# Patient Record
Sex: MALE | Race: WHITE | NOT HISPANIC OR LATINO | Employment: OTHER | URBAN - METROPOLITAN AREA
[De-identification: names, ages, dates, MRNs, and addresses within clinical notes are randomized per-mention and may not be internally consistent; named-entity substitution may affect disease eponyms.]

---

## 2020-09-15 DIAGNOSIS — I10 ESSENTIAL HYPERTENSION: ICD-10-CM

## 2020-09-15 DIAGNOSIS — F41.9 ANXIETY: Primary | ICD-10-CM

## 2020-09-15 DIAGNOSIS — G25.81 RLS (RESTLESS LEGS SYNDROME): ICD-10-CM

## 2020-09-15 RX ORDER — ALPRAZOLAM 0.5 MG/1
1 TABLET ORAL 3 TIMES DAILY PRN
COMMUNITY
End: 2020-09-15 | Stop reason: SDUPTHER

## 2020-09-15 RX ORDER — ROPINIROLE 0.5 MG/1
0.5 TABLET, FILM COATED ORAL 3 TIMES DAILY
COMMUNITY
End: 2020-09-15 | Stop reason: SDUPTHER

## 2020-09-17 RX ORDER — ROPINIROLE 0.5 MG/1
0.5 TABLET, FILM COATED ORAL 3 TIMES DAILY
Qty: 270 TABLET | Refills: 0 | Status: SHIPPED | OUTPATIENT
Start: 2020-09-17 | End: 2020-11-18

## 2020-09-17 RX ORDER — ALPRAZOLAM 0.5 MG/1
0.5 TABLET ORAL 3 TIMES DAILY PRN
Qty: 270 TABLET | Refills: 0 | Status: SHIPPED | OUTPATIENT
Start: 2020-09-17 | End: 2021-04-22 | Stop reason: SDUPTHER

## 2020-10-22 ENCOUNTER — OFFICE VISIT (OUTPATIENT)
Dept: FAMILY MEDICINE CLINIC | Facility: CLINIC | Age: 75
End: 2020-10-22
Payer: COMMERCIAL

## 2020-10-22 VITALS
WEIGHT: 186 LBS | BODY MASS INDEX: 28.19 KG/M2 | RESPIRATION RATE: 16 BRPM | SYSTOLIC BLOOD PRESSURE: 110 MMHG | TEMPERATURE: 98 F | DIASTOLIC BLOOD PRESSURE: 70 MMHG | HEIGHT: 68 IN | OXYGEN SATURATION: 97 % | HEART RATE: 69 BPM

## 2020-10-22 DIAGNOSIS — Z12.5 SCREENING FOR MALIGNANT NEOPLASM OF PROSTATE: ICD-10-CM

## 2020-10-22 DIAGNOSIS — R53.83 OTHER FATIGUE: ICD-10-CM

## 2020-10-22 DIAGNOSIS — K21.9 GASTROESOPHAGEAL REFLUX DISEASE WITHOUT ESOPHAGITIS: ICD-10-CM

## 2020-10-22 DIAGNOSIS — Z13.220 SCREENING CHOLESTEROL LEVEL: ICD-10-CM

## 2020-10-22 DIAGNOSIS — F41.9 ANXIETY: ICD-10-CM

## 2020-10-22 DIAGNOSIS — G25.81 RLS (RESTLESS LEGS SYNDROME): ICD-10-CM

## 2020-10-22 DIAGNOSIS — Z23 ENCOUNTER FOR IMMUNIZATION: Primary | ICD-10-CM

## 2020-10-22 DIAGNOSIS — I10 ESSENTIAL HYPERTENSION: ICD-10-CM

## 2020-10-22 PROCEDURE — 90662 IIV NO PRSV INCREASED AG IM: CPT | Performed by: FAMILY MEDICINE

## 2020-10-22 PROCEDURE — G0008 ADMIN INFLUENZA VIRUS VAC: HCPCS | Performed by: FAMILY MEDICINE

## 2020-10-22 PROCEDURE — 99214 OFFICE O/P EST MOD 30 MIN: CPT | Performed by: FAMILY MEDICINE

## 2020-10-22 RX ORDER — LANSOPRAZOLE 30 MG/1
30 CAPSULE, DELAYED RELEASE ORAL DAILY
Qty: 90 CAPSULE | Refills: 1 | Status: SHIPPED | OUTPATIENT
Start: 2020-10-22 | End: 2021-02-27

## 2020-11-17 DIAGNOSIS — G25.81 RLS (RESTLESS LEGS SYNDROME): ICD-10-CM

## 2020-11-17 DIAGNOSIS — I10 ESSENTIAL HYPERTENSION: ICD-10-CM

## 2020-11-18 RX ORDER — ROPINIROLE 0.5 MG/1
0.5 TABLET, FILM COATED ORAL 3 TIMES DAILY
Qty: 270 TABLET | Refills: 0 | Status: SHIPPED | OUTPATIENT
Start: 2020-11-18 | End: 2021-02-25

## 2021-02-13 DIAGNOSIS — Z23 ENCOUNTER FOR IMMUNIZATION: ICD-10-CM

## 2021-02-24 DIAGNOSIS — G25.81 RLS (RESTLESS LEGS SYNDROME): ICD-10-CM

## 2021-02-24 DIAGNOSIS — I10 ESSENTIAL HYPERTENSION: ICD-10-CM

## 2021-02-25 RX ORDER — ROPINIROLE 0.5 MG/1
TABLET, FILM COATED ORAL
Qty: 270 TABLET | Refills: 0 | Status: SHIPPED | OUTPATIENT
Start: 2021-02-25 | End: 2021-04-22 | Stop reason: SDUPTHER

## 2021-02-26 DIAGNOSIS — K21.9 GASTROESOPHAGEAL REFLUX DISEASE WITHOUT ESOPHAGITIS: ICD-10-CM

## 2021-02-27 RX ORDER — LANSOPRAZOLE 30 MG/1
CAPSULE, DELAYED RELEASE ORAL
Qty: 90 CAPSULE | Refills: 1 | Status: SHIPPED | OUTPATIENT
Start: 2021-02-27 | End: 2021-04-22 | Stop reason: SDUPTHER

## 2021-03-27 LAB
ALBUMIN SERPL-MCNC: 3.9 G/DL (ref 3.6–5.1)
ALBUMIN/GLOB SERPL: 1.3 (CALC) (ref 1–2.5)
ALP SERPL-CCNC: 95 U/L (ref 35–144)
ALT SERPL-CCNC: 12 U/L (ref 9–46)
AST SERPL-CCNC: 14 U/L (ref 10–35)
BASOPHILS # BLD AUTO: 37 CELLS/UL (ref 0–200)
BASOPHILS NFR BLD AUTO: 0.6 %
BILIRUB SERPL-MCNC: 0.7 MG/DL (ref 0.2–1.2)
BUN SERPL-MCNC: 15 MG/DL (ref 7–25)
BUN/CREAT SERPL: NORMAL (CALC) (ref 6–22)
CALCIUM SERPL-MCNC: 8.6 MG/DL (ref 8.6–10.3)
CHLORIDE SERPL-SCNC: 105 MMOL/L (ref 98–110)
CHOLEST SERPL-MCNC: 182 MG/DL
CHOLEST/HDLC SERPL: 5.4 (CALC)
CO2 SERPL-SCNC: 27 MMOL/L (ref 20–32)
CREAT SERPL-MCNC: 1.03 MG/DL (ref 0.7–1.18)
EOSINOPHIL # BLD AUTO: 81 CELLS/UL (ref 15–500)
EOSINOPHIL NFR BLD AUTO: 1.3 %
ERYTHROCYTE [DISTWIDTH] IN BLOOD BY AUTOMATED COUNT: 13.4 % (ref 11–15)
GLOBULIN SER CALC-MCNC: 2.9 G/DL (CALC) (ref 1.9–3.7)
GLUCOSE SERPL-MCNC: 97 MG/DL (ref 65–99)
HAV IGM SERPL QL IA: NORMAL
HBV CORE IGM SERPL QL IA: NORMAL
HBV SURFACE AG SERPL QL IA: NORMAL
HCT VFR BLD AUTO: 46.8 % (ref 38.5–50)
HCV AB S/CO SERPL IA: 0.01
HCV AB SERPL QL IA: NORMAL
HDLC SERPL-MCNC: 34 MG/DL
HGB BLD-MCNC: 15.6 G/DL (ref 13.2–17.1)
LDLC SERPL CALC-MCNC: 122 MG/DL (CALC)
LYMPHOCYTES # BLD AUTO: 1426 CELLS/UL (ref 850–3900)
LYMPHOCYTES NFR BLD AUTO: 23 %
MAGNESIUM SERPL-MCNC: 2 MG/DL (ref 1.5–2.5)
MCH RBC QN AUTO: 30.1 PG (ref 27–33)
MCHC RBC AUTO-ENTMCNC: 33.3 G/DL (ref 32–36)
MCV RBC AUTO: 90.2 FL (ref 80–100)
MONOCYTES # BLD AUTO: 341 CELLS/UL (ref 200–950)
MONOCYTES NFR BLD AUTO: 5.5 %
NEUTROPHILS # BLD AUTO: 4315 CELLS/UL (ref 1500–7800)
NEUTROPHILS NFR BLD AUTO: 69.6 %
NONHDLC SERPL-MCNC: 148 MG/DL (CALC)
PLATELET # BLD AUTO: 177 THOUSAND/UL (ref 140–400)
PMV BLD REES-ECKER: 10.2 FL (ref 7.5–12.5)
POTASSIUM SERPL-SCNC: 3.8 MMOL/L (ref 3.5–5.3)
PROT SERPL-MCNC: 6.8 G/DL (ref 6.1–8.1)
PSA SERPL-MCNC: 0.3 NG/ML
RBC # BLD AUTO: 5.19 MILLION/UL (ref 4.2–5.8)
SL AMB EGFR AFRICAN AMERICAN: 82 ML/MIN/1.73M2
SL AMB EGFR NON AFRICAN AMERICAN: 71 ML/MIN/1.73M2
SODIUM SERPL-SCNC: 139 MMOL/L (ref 135–146)
T4 FREE SERPL-MCNC: 1 NG/DL (ref 0.8–1.8)
TRIGL SERPL-MCNC: 151 MG/DL
TSH SERPL-ACNC: 2.44 MIU/L (ref 0.4–4.5)
URATE SERPL-MCNC: 4.8 MG/DL (ref 4–8)
WBC # BLD AUTO: 6.2 THOUSAND/UL (ref 3.8–10.8)

## 2021-04-16 ENCOUNTER — RA CDI HCC (OUTPATIENT)
Dept: OTHER | Facility: HOSPITAL | Age: 76
End: 2021-04-16

## 2021-04-16 NOTE — PROGRESS NOTES
[facility] 76  coding opportunities          Chart reviewed, no opportunity found: CHART REVIEWED, NO OPPORTUNITY FOUND              Patients insurance company: [employer] only)

## 2021-04-22 ENCOUNTER — OFFICE VISIT (OUTPATIENT)
Dept: FAMILY MEDICINE CLINIC | Facility: CLINIC | Age: 76
End: 2021-04-22
Payer: COMMERCIAL

## 2021-04-22 VITALS
TEMPERATURE: 97.2 F | OXYGEN SATURATION: 96 % | WEIGHT: 188 LBS | HEIGHT: 68 IN | SYSTOLIC BLOOD PRESSURE: 118 MMHG | DIASTOLIC BLOOD PRESSURE: 64 MMHG | RESPIRATION RATE: 16 BRPM | BODY MASS INDEX: 28.49 KG/M2 | HEART RATE: 60 BPM

## 2021-04-22 DIAGNOSIS — K21.9 GASTROESOPHAGEAL REFLUX DISEASE WITHOUT ESOPHAGITIS: ICD-10-CM

## 2021-04-22 DIAGNOSIS — Z00.00 WELL ADULT EXAM: ICD-10-CM

## 2021-04-22 DIAGNOSIS — I10 ESSENTIAL HYPERTENSION: Primary | ICD-10-CM

## 2021-04-22 DIAGNOSIS — F41.9 ANXIETY: ICD-10-CM

## 2021-04-22 DIAGNOSIS — Z23 ENCOUNTER FOR IMMUNIZATION: ICD-10-CM

## 2021-04-22 DIAGNOSIS — G25.81 RLS (RESTLESS LEGS SYNDROME): ICD-10-CM

## 2021-04-22 DIAGNOSIS — M25.551 RIGHT HIP PAIN: ICD-10-CM

## 2021-04-22 DIAGNOSIS — Z12.12 SCREENING FOR COLORECTAL CANCER: ICD-10-CM

## 2021-04-22 DIAGNOSIS — Z12.11 SCREENING FOR COLORECTAL CANCER: ICD-10-CM

## 2021-04-22 PROCEDURE — 3008F BODY MASS INDEX DOCD: CPT | Performed by: FAMILY MEDICINE

## 2021-04-22 PROCEDURE — 1125F AMNT PAIN NOTED PAIN PRSNT: CPT | Performed by: FAMILY MEDICINE

## 2021-04-22 PROCEDURE — 3074F SYST BP LT 130 MM HG: CPT | Performed by: FAMILY MEDICINE

## 2021-04-22 PROCEDURE — 3078F DIAST BP <80 MM HG: CPT | Performed by: FAMILY MEDICINE

## 2021-04-22 PROCEDURE — 99214 OFFICE O/P EST MOD 30 MIN: CPT | Performed by: FAMILY MEDICINE

## 2021-04-22 PROCEDURE — 1160F RVW MEDS BY RX/DR IN RCRD: CPT | Performed by: FAMILY MEDICINE

## 2021-04-22 PROCEDURE — 3288F FALL RISK ASSESSMENT DOCD: CPT | Performed by: FAMILY MEDICINE

## 2021-04-22 PROCEDURE — 3725F SCREEN DEPRESSION PERFORMED: CPT | Performed by: FAMILY MEDICINE

## 2021-04-22 PROCEDURE — 1170F FXNL STATUS ASSESSED: CPT | Performed by: FAMILY MEDICINE

## 2021-04-22 PROCEDURE — G0439 PPPS, SUBSEQ VISIT: HCPCS | Performed by: FAMILY MEDICINE

## 2021-04-22 PROCEDURE — 1036F TOBACCO NON-USER: CPT | Performed by: FAMILY MEDICINE

## 2021-04-22 RX ORDER — ALPRAZOLAM 0.5 MG/1
0.5 TABLET ORAL 3 TIMES DAILY PRN
Qty: 270 TABLET | Refills: 0 | Status: SHIPPED | OUTPATIENT
Start: 2021-04-22 | End: 2021-08-25

## 2021-04-22 RX ORDER — LANSOPRAZOLE 30 MG/1
30 CAPSULE, DELAYED RELEASE ORAL DAILY
Qty: 90 CAPSULE | Refills: 1 | Status: SHIPPED | OUTPATIENT
Start: 2021-04-22 | End: 2022-02-04 | Stop reason: SDUPTHER

## 2021-04-22 RX ORDER — ROPINIROLE 0.5 MG/1
0.5 TABLET, FILM COATED ORAL 3 TIMES DAILY
Qty: 270 TABLET | Refills: 1 | Status: SHIPPED | OUTPATIENT
Start: 2021-04-22 | End: 2022-02-04 | Stop reason: SDUPTHER

## 2021-04-22 RX ORDER — CELECOXIB 200 MG/1
200 CAPSULE ORAL 2 TIMES DAILY
Qty: 90 CAPSULE | Refills: 1 | Status: SHIPPED | OUTPATIENT
Start: 2021-04-22 | End: 2022-02-04 | Stop reason: SDUPTHER

## 2021-04-22 NOTE — PROGRESS NOTES
Assessment and Plan:     Problem List Items Addressed This Visit        Digestive    GERD (gastroesophageal reflux disease)     Patient to continue with present therapy and decrease caffeine, avoid ETOH and smoking to decrease acid production  Pt should also cease eating prior to bedtime and avoid excessive fluid intake prior to sleep  May use antacids as needed for breakthrough GERD            Cardiovascular and Mediastinum    Essential hypertension - Primary     Patient is stable with current anti-hypertensive medicine and continue to follow a low sodium diet and take current medication            Other    RLS (restless legs syndrome)     Patient is stable  and will continue present plan of care and reassess at next routine visit         Anxiety     Patient is stable  and will continue present plan of care and reassess at next routine visit           Other Visit Diagnoses     Screening for colorectal cancer        Encounter for immunization        Well adult exam               Preventive health issues were discussed with patient, and age appropriate screening tests were ordered as noted in patient's After Visit Summary  Personalized health advice and appropriate referrals for health education or preventive services given if needed, as noted in patient's After Visit Summary  History of Present Illness:     Patient presents for Medicare Annual Wellness visit    Patient Care Team:  Narendra Cedillo MD as PCP - General (Family Medicine)     Problem List:     Patient Active Problem List   Diagnosis    RLS (restless legs syndrome)    Essential hypertension    Anxiety    GERD (gastroesophageal reflux disease)      Past Medical and Surgical History:     Past Medical History:   Diagnosis Date    GERD (gastroesophageal reflux disease)      No past surgical history on file     Family History:     Family History   Family history unknown: Yes      Social History:        Social History     Socioeconomic History    Marital status: /Civil Union     Spouse name: None    Number of children: None    Years of education: None    Highest education level: None   Occupational History    Occupation: Retired   Social Needs    Financial resource strain: None    Food insecurity     Worry: None     Inability: None    Transportation needs     Medical: None     Non-medical: None   Tobacco Use    Smoking status: Former Smoker     Types: Cigarettes    Smokeless tobacco: Never Used    Tobacco comment: per [place]   Substance and Sexual Activity    Alcohol use: Yes     Comment: occasional    Drug use: Never     Comment: No use    Sexual activity: None   Lifestyle    Physical activity     Days per week: None     Minutes per session: None    Stress: None   Relationships    Social connections     Talks on phone: None     Gets together: None     Attends Gnosticism service: None     Active member of club or organization: None     Attends meetings of clubs or organizations: None     Relationship status: None    Intimate partner violence     Fear of current or ex partner: None     Emotionally abused: None     Physically abused: None     Forced sexual activity: None   Other Topics Concern    None   Social History Narrative    · Most recent tobacco use screenin2019      · Do you currently or have you served in [email or web address]: Yes      · If Yes, What branch of service:   [employer]      · 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, into active duty, 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:   Yes      · Occupation:   Retired      · Marital status:         · Sexual orientation:   Heterosexual      · Exercise level:   None      · Alcohol intake:   Occasional      · Caffeine intake:   None      · Chewing tobacco:   none      · Guns present in home: Yes      · Seat belts used routinely:   Yes      · Sunscreen used routinely:   Yes      · Smoke alarm in home:    Yes      · Advance directive:   No  19       Medications and Allergies:     Current Outpatient Medications   Medication Sig Dispense Refill    ALPRAZolam (XANAX) 0 5 mg tablet Take 1 tablet (0 5 mg total) by mouth 3 (three) times a day as needed for anxiety 270 tablet 0    lansoprazole (PREVACID) 30 mg capsule TAKE 1 CAPSULE EVERY DAY 90 capsule 1    metoprolol tartrate (LOPRESSOR) 25 mg tablet TAKE 1 TABLET TWICE DAILY 180 tablet 0    rOPINIRole (REQUIP) 0 5 mg tablet TAKE 1 TABLET THREE TIMES DAILY 270 tablet 0     No current facility-administered medications for this visit  No Known Allergies   Immunizations:     Immunization History   Administered Date(s) Administered    INFLUENZA 09/01/2015, 01/03/2018, 12/20/2018    Influenza Quadrivalent Preservative Free 3 years and older IM 10/29/2019    Influenza, high dose seasonal 0 7 mL 10/22/2020    Influenza, injectable, quadrivalent, preservative free 0 5 mL 10/29/2019    Influenza, seasonal, injectable, preservative free 11/15/2016    Pneumococcal 02/29/2012    Pneumococcal Conjugate 13-Valent 04/06/2017, 01/03/2018    SARS-CoV-2 / COVID-19 mRNA IM (Echavarria Iqraerer) 02/03/2021, 03/03/2021    Td (adult), Unspecified 01/01/2017    Tdap 01/28/2013, 04/06/2017      Health Maintenance:         Topic Date Due    Colorectal Cancer Screening  Never done    Hepatitis C Screening  Completed         Topic Date Due    Pneumococcal Vaccine: 65+ Years (2 of 2 - PPSV23) 01/03/2019      Medicare Health Risk Assessment:     Resp 16   Ht 5' 8" (1 727 m)   Wt 85 3 kg (188 lb)   BMI 28 59 kg/m²      Nirali Goodwin is here for his Subsequent Wellness visit  Health Risk Assessment:   Patient rates overall health as very good  Patient feels that their physical health rating is same  Patient is very satisfied with their life  Eyesight was rated as same  Hearing was rated as same  Patient feels that their emotional and mental health rating is same  Patients states they are never, rarely angry   Patient states they are never, rarely unusually tired/fatigued  Pain experienced in the last 7 days has been some  Patient's pain rating has been 5/10  Patient states that he has experienced no weight loss or gain in last 6 months  Depression Screening:   PHQ-2 Score: 0      Fall Risk Screening: In the past year, patient has experienced: no history of falling in past year      Home Safety:  Patient does not have trouble with stairs inside or outside of their home  Patient has working smoke alarms and has working carbon monoxide detector  Home safety hazards include: none  Nutrition:   Current diet is Regular  Medications:   Patient is currently taking over-the-counter supplements  OTC medications include: see medication list  Patient is able to manage medications  Activities of Daily Living (ADLs)/Instrumental Activities of Daily Living (IADLs):   Walk and transfer into and out of bed and chair?: Yes  Dress and groom yourself?: Yes    Bathe or shower yourself?: Yes    Feed yourself?  Yes  Do your laundry/housekeeping?: Yes  Manage your money, pay your bills and track your expenses?: Yes  Make your own meals?: Yes    Do your own shopping?: Yes    Previous Hospitalizations:   Any hospitalizations or ED visits within the last 12 months?: No      Advance Care Planning:   Living will: No    Durable POA for healthcare: No    Advanced directive: No    Advanced directive counseling given: No    Five wishes given: No    Patient declined ACP directive: No    End of Life Decisions reviewed with patient: No    Provider agrees with end of life decisions: No      Cognitive Screening:   Provider or family/friend/caregiver concerned regarding cognition?: No    PREVENTIVE SCREENINGS      Cardiovascular Screening:    General: Screening Current      Diabetes Screening:     General: Screening Current      Prostate Cancer Screening:    General: Screening Not Indicated      Abdominal Aortic Aneurysm (AAA) Screening:    Risk factors include: age between 73-68 yo and tobacco use        Lung Cancer Screening:     General: Screening Not Indicated      Hepatitis C Screening:    General: Screening Current    Screening, Brief Intervention, and Referral to Treatment (SBIRT)    Screening  Typical number of drinks in a day: 0  Typical number of drinks in a week: 9  Interpretation: Low risk drinking behavior      Single Item Drug Screening:  How often have you used an illegal drug (including marijuana) or a prescription medication for non-medical reasons in the past year? never    Single Item Drug Screen Score: 0  Interpretation: Negative screen for possible drug use disorder      Nicole Pablo MD

## 2021-04-22 NOTE — PATIENT INSTRUCTIONS
Medicare Preventive Visit Patient Instructions  Thank you for completing your Welcome to Medicare Visit or Medicare Annual Wellness Visit today  Your next wellness visit will be due in one year (4/23/2022)  The screening/preventive services that you may require over the next 5-10 years are detailed below  Some tests may not apply to you based off risk factors and/or age  Screening tests ordered at today's visit but not completed yet may show as past due  Also, please note that scanned in results may not display below  Preventive Screenings:  Service Recommendations Previous Testing/Comments   Colorectal Cancer Screening  · Colonoscopy    · Fecal Occult Blood Test (FOBT)/Fecal Immunochemical Test (FIT)  · Fecal DNA/Cologuard Test  · Flexible Sigmoidoscopy Age: 54-65 years old   Colonoscopy: every 10 years (May be performed more frequently if at higher risk)  OR  FOBT/FIT: every 1 year  OR  Cologuard: every 3 years  OR  Sigmoidoscopy: every 5 years  Screening may be recommended earlier than age 48 if at higher risk for colorectal cancer  Also, an individualized decision between you and your healthcare provider will decide whether screening between the ages of 74-80 would be appropriate   Colonoscopy: Not on file  FOBT/FIT: Not on file  Cologuard: Not on file  Sigmoidoscopy: Not on file          Prostate Cancer Screening Individualized decision between patient and health care provider in men between ages of 53-78   Medicare will cover every 12 months beginning on the day after your 50th birthday PSA: 0 3 ng/mL     Screening Not Indicated     Hepatitis C Screening Once for adults born between 1945 and 1965  More frequently in patients at high risk for Hepatitis C Hep C Antibody: 03/26/2021    Screening Current   Diabetes Screening 1-2 times per year if you're at risk for diabetes or have pre-diabetes Fasting glucose: No results in last 5 years   A1C: No results in last 5 years    Screening Current   Cholesterol Screening Once every 5 years if you don't have a lipid disorder  May order more often based on risk factors  Lipid panel: 03/26/2021    Screening Current      Other Preventive Screenings Covered by Medicare:  1  Abdominal Aortic Aneurysm (AAA) Screening: covered once if your at risk  You're considered to be at risk if you have a family history of AAA or a male between the age of 73-68 who smoking at least 100 cigarettes in your lifetime  2  Lung Cancer Screening: covers low dose CT scan once per year if you meet all of the following conditions: (1) Age 50-69; (2) No signs or symptoms of lung cancer; (3) Current smoker or have quit smoking within the last 15 years; (4) You have a tobacco smoking history of at least 30 pack years (packs per day x number of years you smoked); (5) You get a written order from a healthcare provider  3  Glaucoma Screening: covered annually if you're considered high risk: (1) You have diabetes OR (2) Family history of glaucoma OR (3)  aged 48 and older OR (3)  American aged 72 and older  3  Osteoporosis Screening: covered every 2 years if you meet one of the following conditions: (1) Have a vertebral abnormality; (2) On glucocorticoid therapy for more than 3 months; (3) Have primary hyperparathyroidism; (4) On osteoporosis medications and need to assess response to drug therapy  5  HIV Screening: covered annually if you're between the age of 12-76  Also covered annually if you are younger than 13 and older than 72 with risk factors for HIV infection  For pregnant patients, it is covered up to 3 times per pregnancy      Immunizations:  Immunization Recommendations   Influenza Vaccine Annual influenza vaccination during flu season is recommended for all persons aged >= 6 months who do not have contraindications   Pneumococcal Vaccine (Prevnar and Pneumovax)  * Prevnar = PCV13  * Pneumovax = PPSV23 Adults 25-60 years old: 1-3 doses may be recommended based on certain risk factors  Adults 72 years old: Prevnar (PCV13) vaccine recommended followed by Pneumovax (PPSV23) vaccine  If already received PPSV23 since turning 65, then PCV13 recommended at least one year after PPSV23 dose  Hepatitis B Vaccine 3 dose series if at intermediate or high risk (ex: diabetes, end stage renal disease, liver disease)   Tetanus (Td) Vaccine - COST NOT COVERED BY MEDICARE PART B Following completion of primary series, a booster dose should be given every 10 years to maintain immunity against tetanus  Td may also be given as tetanus wound prophylaxis  Tdap Vaccine - COST NOT COVERED BY MEDICARE PART B Recommended at least once for all adults  For pregnant patients, recommended with each pregnancy  Shingles Vaccine (Shingrix) - COST NOT COVERED BY MEDICARE PART B  2 shot series recommended in those aged 48 and above     Health Maintenance Due:      Topic Date Due    Colorectal Cancer Screening  Never done    Hepatitis C Screening  Completed     Immunizations Due:      Topic Date Due    Pneumococcal Vaccine: 65+ Years (2 of 2 - PPSV23) 01/03/2019     Advance Directives   What are advance directives? Advance directives are legal documents that state your wishes and plans for medical care  These plans are made ahead of time in case you lose your ability to make decisions for yourself  Advance directives can apply to any medical decision, such as the treatments you want, and if you want to donate organs  What are the types of advance directives? There are many types of advance directives, and each state has rules about how to use them  You may choose a combination of any of the following:  · Living will: This is a written record of the treatment you want  You can also choose which treatments you do not want, which to limit, and which to stop at a certain time  This includes surgery, medicine, IV fluid, and tube feedings  · Durable power of  for healthcare [facility]):   This is a written record that states who you want to make healthcare choices for you when you are unable to make them for yourself  This person, called a proxy, is usually a family member or a friend  You may choose more than 1 proxy  · Do not resuscitate (DNR) order:  A DNR order is used in case your heart stops beating or you stop breathing  It is a request not to have certain forms of treatment, such as CPR  A DNR order may be included in other types of advance directives  · Medical directive: This covers the care that you want if you are in a coma, near death, or unable to make decisions for yourself  You can list the treatments you want for each condition  Treatment may include pain medicine, surgery, blood transfusions, dialysis, IV or tube feedings, and a ventilator (breathing machine)  · Values history: This document has questions about your views, beliefs, and how you feel and think about life  This information can help others choose the care that you would choose  Why are advance directives important? An advance directive helps you control your care  Although spoken wishes may be used, it is better to have your wishes written down  Spoken wishes can be misunderstood, or not followed  Treatments may be given even if you do not want them  An advance directive may make it easier for your family to make difficult choices about your care  Weight Management   Why it is important to manage your weight:  Being overweight increases your risk of health conditions such as heart disease, high blood pressure, type 2 diabetes, and certain types of cancer  It can also increase your risk for osteoarthritis, sleep apnea, and other respiratory problems  Aim for a slow, steady weight loss  Even a small amount of weight loss can lower your risk of health problems  How to lose weight safely:  A safe and healthy way to lose weight is to eat fewer calories and get regular exercise   You can lose up about 1 pound a week by decreasing the number of calories you eat by 500 calories each day  Healthy meal plan for weight management:  A healthy meal plan includes a variety of foods, contains fewer calories, and helps you stay healthy  A healthy meal plan includes the following:  · Eat whole-grain foods more often  A healthy meal plan should contain fiber  Fiber is the part of grains, fruits, and vegetables that is not broken down by your body  Whole-grain foods are healthy and provide extra fiber in your diet  Some examples of whole-grain foods are whole-wheat breads and pastas, oatmeal, brown rice, and bulgur  · Eat a variety of vegetables every day  Include dark, leafy greens such as spinach, kale, odessa greens, and mustard greens  Eat yellow and orange vegetables such as carrots, sweet potatoes, and winter squash  · Eat a variety of fruits every day  Choose fresh or canned fruit (canned in its own juice or light syrup) instead of juice  Fruit juice has very little or no fiber  · Eat low-fat dairy foods  Drink fat-free (skim) milk or 1% milk  Eat fat-free yogurt and low-fat cottage cheese  Try low-fat cheeses such as mozzarella and other reduced-fat cheeses  · Choose meat and other protein foods that are low in fat  Choose beans or other legumes such as split peas or lentils  Choose fish, skinless poultry (chicken or turkey), or lean cuts of red meat (beef or pork)  Before you cook meat or poultry, cut off any visible fat  · Use less fat and oil  Try baking foods instead of frying them  Add less fat, such as margarine, sour cream, regular salad dressing and mayonnaise to foods  Eat fewer high-fat foods  Some examples of high-fat foods include french fries, doughnuts, ice cream, and cakes  · Eat fewer sweets  Limit foods and drinks that are high in sugar  This includes candy, cookies, regular soda, and sweetened drinks  Exercise:  Exercise at least 30 minutes per day on most days of the week   Some examples of exercise include walking, biking, dancing, and swimming  You can also fit in more physical activity by taking the stairs instead of the elevator or parking farther away from stores  Ask your healthcare provider about the best exercise plan for you  © Copyright [employer] 2018 Information is for End User's use only and may not be sold, redistributed or otherwise used for commercial purposes  All illustrations and images included in CareNotes® are the copyrighted property of [employer]  or [facility] Preventive Visit Patient Instructions  Thank you for completing your Welcome to Medicare Visit or Medicare Annual Wellness Visit today  Your next wellness visit will be due in one year (4/23/2022)  The screening/preventive services that you may require over the next 5-10 years are detailed below  Some tests may not apply to you based off risk factors and/or age  Screening tests ordered at today's visit but not completed yet may show as past due  Also, please note that scanned in results may not display below  Preventive Screenings:  Service Recommendations Previous Testing/Comments   Colorectal Cancer Screening  · Colonoscopy    · Fecal Occult Blood Test (FOBT)/Fecal Immunochemical Test (FIT)  · Fecal DNA/Cologuard Test  · Flexible Sigmoidoscopy Age: 54-65 years old   Colonoscopy: every 10 years (May be performed more frequently if at higher risk)  OR  FOBT/FIT: every 1 year  OR  Cologuard: every 3 years  OR  Sigmoidoscopy: every 5 years  Screening may be recommended earlier than age 48 if at higher risk for colorectal cancer  Also, an individualized decision between you and your healthcare provider will decide whether screening between the ages of 74-80 would be appropriate   Colonoscopy: Not on file  FOBT/FIT: Not on file  Cologuard: Not on file  Sigmoidoscopy: Not on file          Prostate Cancer Screening Individualized decision between patient and health care provider in men between ages of 55-69   Medicare will cover every 12 months beginning on the day after your 50th birthday PSA: 0 3 ng/mL     Screening Not Indicated  Screening Not Indicated     Hepatitis C Screening Once for adults born between 1945 and 1965  More frequently in patients at high risk for Hepatitis C Hep C Antibody: 03/26/2021    Screening Current  Screening Current   Diabetes Screening 1-2 times per year if you're at risk for diabetes or have pre-diabetes Fasting glucose: No results in last 5 years   A1C: No results in last 5 years    Screening Current  Screening Current   Cholesterol Screening Once every 5 years if you don't have a lipid disorder  May order more often based on risk factors  Lipid panel: 03/26/2021    Screening Current  Screening Current      Other Preventive Screenings Covered by Medicare:  6  Abdominal Aortic Aneurysm (AAA) Screening: covered once if your at risk  You're considered to be at risk if you have a family history of AAA or a male between the age of 73-68 who smoking at least 100 cigarettes in your lifetime  7  Lung Cancer Screening: covers low dose CT scan once per year if you meet all of the following conditions: (1) Age 50-69; (2) No signs or symptoms of lung cancer; (3) Current smoker or have quit smoking within the last 15 years; (4) You have a tobacco smoking history of at least 30 pack years (packs per day x number of years you smoked); (5) You get a written order from a healthcare provider  8  Glaucoma Screening: covered annually if you're considered high risk: (1) You have diabetes OR (2) Family history of glaucoma OR (3)  aged 48 and older OR (3)  American aged 72 and older  5   Osteoporosis Screening: covered every 2 years if you meet one of the following conditions: (1) Have a vertebral abnormality; (2) On glucocorticoid therapy for more than 3 months; (3) Have primary hyperparathyroidism; (4) On osteoporosis medications and need to assess response to drug therapy  10  HIV Screening: covered annually if you're between the age of 12-76  Also covered annually if you are younger than 13 and older than 72 with risk factors for HIV infection  For pregnant patients, it is covered up to 3 times per pregnancy  Immunizations:  Immunization Recommendations   Influenza Vaccine Annual influenza vaccination during flu season is recommended for all persons aged >= 6 months who do not have contraindications   Pneumococcal Vaccine (Prevnar and Pneumovax)  * Prevnar = PCV13  * Pneumovax = PPSV23 Adults 25-60 years old: 1-3 doses may be recommended based on certain risk factors  Adults 72 years old: Prevnar (PCV13) vaccine recommended followed by Pneumovax (PPSV23) vaccine  If already received PPSV23 since turning 65, then PCV13 recommended at least one year after PPSV23 dose  Hepatitis B Vaccine 3 dose series if at intermediate or high risk (ex: diabetes, end stage renal disease, liver disease)   Tetanus (Td) Vaccine - COST NOT COVERED BY MEDICARE PART B Following completion of primary series, a booster dose should be given every 10 years to maintain immunity against tetanus  Td may also be given as tetanus wound prophylaxis  Tdap Vaccine - COST NOT COVERED BY MEDICARE PART B Recommended at least once for all adults  For pregnant patients, recommended with each pregnancy  Shingles Vaccine (Shingrix) - COST NOT COVERED BY MEDICARE PART B  2 shot series recommended in those aged 48 and above     Health Maintenance Due:      Topic Date Due    Colorectal Cancer Screening  Never done    Hepatitis C Screening  Completed     Immunizations Due:      Topic Date Due    Pneumococcal Vaccine: 65+ Years (2 of 2 - PPSV23) 01/03/2019     Advance Directives   What are advance directives? Advance directives are legal documents that state your wishes and plans for medical care  These plans are made ahead of time in case you lose your ability to make decisions for yourself   Advance directives can apply to any medical decision, such as the treatments you want, and if you want to donate organs  What are the types of advance directives? There are many types of advance directives, and each state has rules about how to use them  You may choose a combination of any of the following:  · Living will: This is a written record of the treatment you want  You can also choose which treatments you do not want, which to limit, and which to stop at a certain time  This includes surgery, medicine, IV fluid, and tube feedings  · Durable power of  for healthcare [place] [facility]): This is a written record that states who you want to make healthcare choices for you when you are unable to make them for yourself  This person, called a proxy, is usually a family member or a friend  You may choose more than 1 proxy  · Do not resuscitate (DNR) order:  A DNR order is used in case your heart stops beating or you stop breathing  It is a request not to have certain forms of treatment, such as CPR  A DNR order may be included in other types of advance directives  · Medical directive: This covers the care that you want if you are in a coma, near death, or unable to make decisions for yourself  You can list the treatments you want for each condition  Treatment may include pain medicine, surgery, blood transfusions, dialysis, IV or tube feedings, and a ventilator (breathing machine)  · Values history: This document has questions about your views, beliefs, and how you feel and think about life  This information can help others choose the care that you would choose  Why are advance directives important? An advance directive helps you control your care  Although spoken wishes may be used, it is better to have your wishes written down  Spoken wishes can be misunderstood, or not followed  Treatments may be given even if you do not want them   An advance directive may make it easier for your family to make difficult choices about your care  Weight Management   Why it is important to manage your weight:  Being overweight increases your risk of health conditions such as heart disease, high blood pressure, type 2 diabetes, and certain types of cancer  It can also increase your risk for osteoarthritis, sleep apnea, and other respiratory problems  Aim for a slow, steady weight loss  Even a small amount of weight loss can lower your risk of health problems  How to lose weight safely:  A safe and healthy way to lose weight is to eat fewer calories and get regular exercise  You can lose up about 1 pound a week by decreasing the number of calories you eat by 500 calories each day  Healthy meal plan for weight management:  A healthy meal plan includes a variety of foods, contains fewer calories, and helps you stay healthy  A healthy meal plan includes the following:  · Eat whole-grain foods more often  A healthy meal plan should contain fiber  Fiber is the part of grains, fruits, and vegetables that is not broken down by your body  Whole-grain foods are healthy and provide extra fiber in your diet  Some examples of whole-grain foods are whole-wheat breads and pastas, oatmeal, brown rice, and bulgur  · Eat a variety of vegetables every day  Include dark, leafy greens such as spinach, kale, odessa greens, and mustard greens  Eat yellow and orange vegetables such as carrots, sweet potatoes, and winter squash  · Eat a variety of fruits every day  Choose fresh or canned fruit (canned in its own juice or light syrup) instead of juice  Fruit juice has very little or no fiber  · Eat low-fat dairy foods  Drink fat-free (skim) milk or 1% milk  Eat fat-free yogurt and low-fat cottage cheese  Try low-fat cheeses such as mozzarella and other reduced-fat cheeses  · Choose meat and other protein foods that are low in fat  Choose beans or other legumes such as split peas or lentils   Choose fish, skinless poultry (chicken or turkey), or lean cuts of red meat (beef or pork)  Before you cook meat or poultry, cut off any visible fat  · Use less fat and oil  Try baking foods instead of frying them  Add less fat, such as margarine, sour cream, regular salad dressing and mayonnaise to foods  Eat fewer high-fat foods  Some examples of high-fat foods include french fries, doughnuts, ice cream, and cakes  · Eat fewer sweets  Limit foods and drinks that are high in sugar  This includes candy, cookies, regular soda, and sweetened drinks  Exercise:  Exercise at least 30 minutes per day on most days of the week  Some examples of exercise include walking, biking, dancing, and swimming  You can also fit in more physical activity by taking the stairs instead of the elevator or parking farther away from stores  Ask your healthcare provider about the best exercise plan for you  © Copyright [employer] 2018 Information is for End User's use only and may not be sold, redistributed or otherwise used for commercial purposes   All illustrations and images included in CareNotes® are the copyrighted property of [employer]  or [address] [place] [address]

## 2021-04-22 NOTE — ASSESSMENT & PLAN NOTE
Patient to continue with present therapy and decrease caffeine, avoid ETOH and smoking to decrease acid production  Pt should also cease eating prior to bedtime and avoid excessive fluid intake prior to sleep   May use antacids as needed for breakthrough GERD

## 2021-04-22 NOTE — PROGRESS NOTES
BMI Counseling: There is no height or weight on file to calculate BMI  The BMI is above normal  Nutrition recommendations include decreasing portion sizes, encouraging healthy choices of fruits and vegetables, decreasing fast food intake, consuming healthier snacks, moderation in carbohydrate intake, increasing intake of lean protein, reducing intake of saturated and trans fat and reducing intake of cholesterol  No pharmacotherapy was ordered  Patient referred to PCP due to patient being overweight  Falls Plan of Care: balance, strength, and gait training instructions were provided  Assessment/Plan:         Problem List Items Addressed This Visit        Digestive    GERD (gastroesophageal reflux disease)     Patient to continue with present therapy and decrease caffeine, avoid ETOH and smoking to decrease acid production  Pt should also cease eating prior to bedtime and avoid excessive fluid intake prior to sleep  May use antacids as needed for breakthrough GERD            Cardiovascular and Mediastinum    Essential hypertension - Primary     Patient is stable with current anti-hypertensive medicine and continue to follow a low sodium diet and take current medication            Other    RLS (restless legs syndrome)     Patient is stable  and will continue present plan of care and reassess at next routine visit         Anxiety     Patient is stable  and will continue present plan of care and reassess at next routine visit           Other Visit Diagnoses     Screening for colorectal cancer        Encounter for immunization        Well adult exam                Subjective:      Patient ID: Brigido Gould is a 77 y o  male  This is a 50-year-old white male here for checkup on his blood pressure restless leg syndrome GERD anxiety and problems with right hip pain  Patient has had some problems with his joints with arthritis over the years as looking for something to take for pain    Will start him on some Celebrex 200 mg 1 today he does take Prevacid for stomach we told to take that was some food in the stomach to prevent from getting on ulcer disease he has normal kidney function at should not be a problem for him  He had lab work done today was reviewed and was normal   Patient also had his COVID-19 vaccine is doing quite well after that  Patient also needs refills on all of his medicines which include for pills  The following portions of the patient's history were reviewed and updated as appropriate:   Past Medical History:  He has a past medical history of GERD (gastroesophageal reflux disease)  ,  _______________________________________________________________________  Medical Problems:  does not have any pertinent problems on file ,  _______________________________________________________________________  Past Surgical History:   has no past surgical history on file ,  _______________________________________________________________________  Family History:  Family history is unknown by patient  ,  _______________________________________________________________________  Social History:   reports that he has quit smoking  His smoking use included cigarettes  He has never used smokeless tobacco  He reports current alcohol use  He reports that he does not use drugs  ,  _______________________________________________________________________  Allergies:  has No Known Allergies     _______________________________________________________________________  Current Outpatient Medications   Medication Sig Dispense Refill    ALPRAZolam (XANAX) 0 5 mg tablet Take 1 tablet (0 5 mg total) by mouth 3 (three) times a day as needed for anxiety 270 tablet 0    lansoprazole (PREVACID) 30 mg capsule TAKE 1 CAPSULE EVERY DAY 90 capsule 1    metoprolol tartrate (LOPRESSOR) 25 mg tablet TAKE 1 TABLET TWICE DAILY 180 tablet 0    rOPINIRole (REQUIP) 0 5 mg tablet TAKE 1 TABLET THREE TIMES DAILY 270 tablet 0     No current facility-administered medications for this visit       _______________________________________________________________________  Review of Systems   Constitutional: Negative for activity change, appetite change, chills, fatigue, fever and unexpected weight change  HENT: Negative for congestion, ear pain, hearing loss, mouth sores, postnasal drip, sinus pressure, sinus pain, sneezing and sore throat  Respiratory: Negative for apnea, cough, shortness of breath and wheezing  Cardiovascular: Negative for chest pain, palpitations and leg swelling  Gastrointestinal: Negative for abdominal pain, constipation, diarrhea, nausea and vomiting  Endocrine: Negative for cold intolerance and heat intolerance  Genitourinary: Negative for dysuria, frequency and hematuria  Musculoskeletal: Positive for arthralgias  Negative for back pain, gait problem, joint swelling and neck pain  Skin: Negative for rash  Neurological: Negative for dizziness, weakness and numbness  Hematological: Does not bruise/bleed easily  Psychiatric/Behavioral: Negative for agitation, behavioral problems, confusion, hallucinations and sleep disturbance  The patient is not nervous/anxious  Objective: There were no vitals filed for this visit  There is no height or weight on file to calculate BMI  Physical Exam  Vitals signs and nursing note reviewed  Constitutional:       Appearance: He is well-developed  HENT:      Head: Normocephalic and atraumatic  Nose: Nose normal       Mouth/Throat:      Mouth: Mucous membranes are moist    Eyes:      General: No scleral icterus  Conjunctiva/sclera: Conjunctivae normal       Pupils: Pupils are equal, round, and reactive to light  Neck:      Musculoskeletal: Normal range of motion and neck supple  Thyroid: No thyromegaly  Cardiovascular:      Rate and Rhythm: Normal rate and regular rhythm  Heart sounds: Normal heart sounds     Pulmonary:      Effort: Pulmonary effort is normal  No respiratory distress  Breath sounds: Normal breath sounds  No wheezing  Abdominal:      General: Bowel sounds are normal       Palpations: Abdomen is soft  Tenderness: There is no abdominal tenderness  There is no guarding or rebound  Musculoskeletal: Normal range of motion  General: Tenderness present  Skin:     General: Skin is warm and dry  Findings: No rash  Neurological:      Mental Status: He is alert and oriented to person, place, and time  Psychiatric:         Mood and Affect: Mood normal          Behavior: Behavior normal          Thought Content:  Thought content normal          Judgment: Judgment normal

## 2021-04-22 NOTE — PROGRESS NOTES
Assessment and Plan:     Problem List Items Addressed This Visit        Digestive    GERD (gastroesophageal reflux disease)     Patient to continue with present therapy and decrease caffeine, avoid ETOH and smoking to decrease acid production  Pt should also cease eating prior to bedtime and avoid excessive fluid intake prior to sleep  May use antacids as needed for breakthrough GERD         Relevant Medications    lansoprazole (PREVACID) 30 mg capsule       Cardiovascular and Mediastinum    Essential hypertension - Primary     Patient is stable with current anti-hypertensive medicine and continue to follow a low sodium diet and take current medication         Relevant Medications    metoprolol tartrate (LOPRESSOR) 25 mg tablet       Other    RLS (restless legs syndrome)     Patient is stable  and will continue present plan of care and reassess at next routine visit         Relevant Medications    rOPINIRole (REQUIP) 0 5 mg tablet    Anxiety     Patient is stable  and will continue present plan of care and reassess at next routine visit         Relevant Medications    ALPRAZolam (XANAX) 0 5 mg tablet    Right hip pain     Will start celebrex prn  Relevant Medications    celecoxib (CeleBREX) 200 mg capsule      Other Visit Diagnoses     Screening for colorectal cancer        Encounter for immunization        Well adult exam               Preventive health issues were discussed with patient, and age appropriate screening tests were ordered as noted in patient's After Visit Summary  Personalized health advice and appropriate referrals for health education or preventive services given if needed, as noted in patient's After Visit Summary       History of Present Illness:     Patient presents for Medicare Annual Wellness visit    Patient Care Team:  Zuleyma Chun MD as PCP - General (Family Medicine)     Problem List:     Patient Active Problem List   Diagnosis    RLS (restless legs syndrome)    Essential hypertension    Anxiety    GERD (gastroesophageal reflux disease)    Right hip pain      Past Medical and Surgical History:     Past Medical History:   Diagnosis Date    GERD (gastroesophageal reflux disease)     Right hip pain 2021     No past surgical history on file  Family History:     Family History   Family history unknown: Yes      Social History:        Social History     Socioeconomic History    Marital status: /Civil Union     Spouse name: None    Number of children: None    Years of education: None    Highest education level: None   Occupational History    Occupation: Retired   Social Needs    Financial resource strain: None    Food insecurity     Worry: None     Inability: None    Transportation needs     Medical: None     Non-medical: None   Tobacco Use    Smoking status: Former Smoker     Types: Cigarettes    Smokeless tobacco: Never Used    Tobacco comment: per Renetta Linder   Substance and Sexual Activity    Alcohol use: Yes     Comment: occasional    Drug use: Never     Comment: No use    Sexual activity: None   Lifestyle    Physical activity     Days per week: None     Minutes per session: None    Stress: None   Relationships    Social connections     Talks on phone: None     Gets together: None     Attends Orthodox service: None     Active member of club or organization: None     Attends meetings of clubs or organizations: None     Relationship status: None    Intimate partner violence     Fear of current or ex partner: None     Emotionally abused: None     Physically abused: None     Forced sexual activity: None   Other Topics Concern    None   Social History Narrative    · Most recent tobacco use screenin2019      · Do you currently or have you served in the [employer]:    Yes      · If Yes, What branch of service:   [employer]      · 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, into active duty, as a member of the [employer] or as a Reservist:   Yes      · Occupation: Retired      · Marital status:         · Sexual orientation:   Heterosexual      · Exercise level:   None      · Alcohol intake:   Occasional      · Caffeine intake:   None      · Chewing tobacco:   none      · Guns present in home: Yes      · Seat belts used routinely:   Yes      · Sunscreen used routinely:   Yes      · Smoke alarm in home: Yes      · Advance directive:   No  11/26/19       Medications and Allergies:     Current Outpatient Medications   Medication Sig Dispense Refill    ALPRAZolam (XANAX) 0 5 mg tablet Take 1 tablet (0 5 mg total) by mouth 3 (three) times a day as needed for anxiety 270 tablet 0    lansoprazole (PREVACID) 30 mg capsule Take 1 capsule (30 mg total) by mouth daily 90 capsule 1    metoprolol tartrate (LOPRESSOR) 25 mg tablet Take 1 tablet (25 mg total) by mouth 2 (two) times a day 180 tablet 1    rOPINIRole (REQUIP) 0 5 mg tablet Take 1 tablet (0 5 mg total) by mouth 3 (three) times a day 270 tablet 1    celecoxib (CeleBREX) 200 mg capsule Take 1 capsule (200 mg total) by mouth 2 (two) times a day 90 capsule 1     No current facility-administered medications for this visit        No Known Allergies   Immunizations:     Immunization History   Administered Date(s) Administered    INFLUENZA 09/01/2015, 01/03/2018, 12/20/2018    Influenza Quadrivalent Preservative Free 3 years and older IM 10/29/2019    Influenza, high dose seasonal 0 7 mL 10/22/2020    Influenza, injectable, quadrivalent, preservative free 0 5 mL 10/29/2019    Influenza, seasonal, injectable, preservative free 11/15/2016    Pneumococcal 02/29/2012    Pneumococcal Conjugate 13-Valent 04/06/2017, 01/03/2018    SARS-CoV-2 / COVID-19 mRNA IM (DIRECTV) 02/03/2021, 03/03/2021    Td (adult), Unspecified 01/01/2017    Tdap 01/28/2013, 04/06/2017      Health Maintenance:         Topic Date Due    Colorectal Cancer Screening  Never done    Hepatitis C Screening  Completed         Topic Date Due    Pneumococcal Vaccine: 65+ Years (2 of 2 - PPSV23) 01/03/2019      Medicare Health Risk Assessment:     /64 (BP Location: Left arm, Patient Position: Sitting, Cuff Size: Standard)   Pulse 60   Temp (!) 97 2 °F (36 2 °C) (Temporal)   Resp 16   Ht 5' 8" (1 727 m)   Wt 85 3 kg (188 lb)   SpO2 96%   BMI 28 59 kg/m²          Health Risk Assessment:   Patient rates overall health as very good  Patient feels that their physical health rating is same  Patient is very satisfied with their life  Eyesight was rated as same  Hearing was rated as same  Patient feels that their emotional and mental health rating is same  Patients states they are never, rarely angry  Patient states they are never, rarely unusually tired/fatigued  Pain experienced in the last 7 days has been some  Patient's pain rating has been 5/10  Patient states that he has experienced no weight loss or gain in last 6 months  Depression Screening:   PHQ-2 Score: 0      Fall Risk Screening: In the past year, patient has experienced: no history of falling in past year      Home Safety:  Patient does not have trouble with stairs inside or outside of their home  Patient has working smoke alarms and has working carbon monoxide detector  Home safety hazards include: none  Nutrition:   Current diet is Regular  Medications:   Patient is currently taking over-the-counter supplements  OTC medications include: see medication list  Patient is able to manage medications  Activities of Daily Living (ADLs)/Instrumental Activities of Daily Living (IADLs):   Walk and transfer into and out of bed and chair?: Yes  Dress and groom yourself?: Yes    Bathe or shower yourself?: Yes    Feed yourself?  Yes  Do your laundry/housekeeping?: Yes  Manage your money, pay your bills and track your expenses?: Yes  Make your own meals?: Yes    Do your own shopping?: Yes    Previous Hospitalizations:   Any hospitalizations or ED visits within the last 12 months?: No Advance Care Planning:   Living will: No    Durable POA for healthcare: No    Advanced directive: No    Advanced directive counseling given: No    Five wishes given: No    Patient declined ACP directive: No    End of Life Decisions reviewed with patient: No    Provider agrees with end of life decisions: No      Cognitive Screening:   Provider or family/friend/caregiver concerned regarding cognition?: No    PREVENTIVE SCREENINGS      Cardiovascular Screening:    General: Screening Current      Diabetes Screening:     General: Screening Current      Prostate Cancer Screening:    General: Screening Not Indicated      Abdominal Aortic Aneurysm (AAA) Screening:    Risk factors include: age between 73-67 yo and tobacco use        Lung Cancer Screening:     General: Screening Not Indicated      Hepatitis C Screening:    General: Screening Current    Screening, Brief Intervention, and Referral to Treatment (SBIRT)    Screening  Typical number of drinks in a day: 0  Typical number of drinks in a week: 0  Interpretation: Low risk drinking behavior      Single Item Drug Screening:  How often have you used an illegal drug (including marijuana) or a prescription medication for non-medical reasons in the past year? never    Single Item Drug Screen Score: 0  Interpretation: Negative screen for possible drug use disorder      Narendra Cedillo MD

## 2021-08-25 DIAGNOSIS — F41.9 ANXIETY: ICD-10-CM

## 2021-08-25 RX ORDER — ALPRAZOLAM 0.5 MG/1
TABLET ORAL
Qty: 90 TABLET | Refills: 0 | Status: SHIPPED | OUTPATIENT
Start: 2021-08-25

## 2021-08-25 NOTE — TELEPHONE ENCOUNTER
Requested medication(s) are due for refill today: Yes  Patient has already received a courtesy refill: No  Other reason request has been forwarded to provider: controlled substance cannot be delegated

## 2022-01-27 ENCOUNTER — RA CDI HCC (OUTPATIENT)
Dept: OTHER | Facility: HOSPITAL | Age: 77
End: 2022-01-27

## 2022-01-27 NOTE — PROGRESS NOTES
[facility]  coding opportunities       Chart reviewed, no opportunity found: CHART REVIEWED, NO OPPORTUNITY FOUND                        Patients insurance company: [employer] [address]  (Medicare Advantage and Commercial)

## 2022-02-01 NOTE — PROGRESS NOTES
BMI Counseling: There is no height or weight on file to calculate BMI  The BMI is above normal  Nutrition recommendations include decreasing portion sizes, encouraging healthy choices of fruits and vegetables, decreasing fast food intake, consuming healthier snacks, limiting drinks that contain sugar, moderation in carbohydrate intake, increasing intake of lean protein, reducing intake of saturated and trans fat and reducing intake of cholesterol  Exercise recommendations include exercising 3-5 times per week  No pharmacotherapy was ordered  Patient referred to PCP  Rationale for BMI follow-up plan is due to patient being overweight or obese  Falls Plan of Care: balance, strength, and gait training instructions were provided  Home safety education provided  Assessment/Plan:         Problem List Items Addressed This Visit        Digestive    GERD (gastroesophageal reflux disease)     Patient to continue with present therapy and decrease caffeine, avoid ETOH and smoking to decrease acid production  Pt should also cease eating prior to bedtime and avoid excessive fluid intake prior to sleep  May use antacids as needed for breakthrough GERD  All pateint questions answered today about this condition  Other    RLS (restless legs syndrome)     Patient is stable  and will continue present plan of care and reassess at next routine visit  All questions about this problem from patient were answered today  Anxiety     Patient to continue utilizing medical therapy as well as counseling sources as applicable to condition  If suicidal thought or fear of suicide attempt, to call 911 and seek help immediately  Medications and therapy reviewed today and all patient  questions answered today  Other Visit Diagnoses     Screening for colorectal cancer    -  Primary            Subjective:      Patient ID: Byron Lazcano is a 77 y o  male      This 30-year-old male here today for checkup on multiple medical problems  Patient with some GERD history of some right hip pain restless leg syndrome and some anxiety  Patient doing very well they still has some right hip pain  Patient needs refills on his prescriptions which is waiting on which pharmacy they are going to use because the changed after January 1st       The following portions of the patient's history were reviewed and updated as appropriate:   Past Medical History:  He has a past medical history of GERD (gastroesophageal reflux disease) and Right hip pain (4/22/2021)  ,  _______________________________________________________________________  Medical Problems:  does not have any pertinent problems on file ,  _______________________________________________________________________  Past Surgical History:   has no past surgical history on file ,  _______________________________________________________________________  Family History:  Family history is unknown by patient  ,  _______________________________________________________________________  Social History:   reports that he has quit smoking  His smoking use included cigarettes  He has never used smokeless tobacco  He reports current alcohol use  He reports that he does not use drugs  ,  _______________________________________________________________________  Allergies:  has No Known Allergies     _______________________________________________________________________  Current Outpatient Medications   Medication Sig Dispense Refill    ALPRAZolam (XANAX) 0 5 mg tablet TAKE 1 TABLET 3 TIMES A DAYAS NEEDED FOR ANXIETY 90 tablet 0    celecoxib (CeleBREX) 200 mg capsule Take 1 capsule (200 mg total) by mouth 2 (two) times a day 90 capsule 1    lansoprazole (PREVACID) 30 mg capsule Take 1 capsule (30 mg total) by mouth daily 90 capsule 1    metoprolol tartrate (LOPRESSOR) 25 mg tablet Take 1 tablet (25 mg total) by mouth 2 (two) times a day 180 tablet 1    rOPINIRole (REQUIP) 0 5 mg tablet Take 1 tablet (0 5 mg total) by mouth 3 (three) times a day 270 tablet 1     No current facility-administered medications for this visit      _______________________________________________________________________  Review of Systems   Constitutional: Negative for activity change, appetite change, chills, fatigue, fever and unexpected weight change  HENT: Negative for congestion, ear pain, hearing loss, mouth sores, postnasal drip, sinus pressure, sinus pain, sneezing and sore throat  Respiratory: Negative for apnea, cough, shortness of breath and wheezing  Cardiovascular: Negative for chest pain, palpitations and leg swelling  Gastrointestinal: Negative for abdominal pain, constipation, diarrhea, nausea and vomiting  Endocrine: Negative for cold intolerance and heat intolerance  Genitourinary: Negative for dysuria, frequency and hematuria  Musculoskeletal: Negative for arthralgias, back pain, gait problem, joint swelling and neck pain  Skin: Negative for rash  Neurological: Negative for dizziness, weakness and numbness  Hematological: Does not bruise/bleed easily  Psychiatric/Behavioral: Negative for agitation, behavioral problems, confusion, hallucinations and sleep disturbance  The patient is not nervous/anxious  Objective: There were no vitals filed for this visit  There is no height or weight on file to calculate BMI  Physical Exam  Vitals and nursing note reviewed  Constitutional:       Appearance: He is well-developed  HENT:      Head: Normocephalic and atraumatic  Nose: Nose normal       Mouth/Throat:      Mouth: Mucous membranes are moist    Eyes:      General: No scleral icterus  Conjunctiva/sclera: Conjunctivae normal       Pupils: Pupils are equal, round, and reactive to light  Neck:      Thyroid: No thyromegaly  Cardiovascular:      Rate and Rhythm: Normal rate and regular rhythm  Heart sounds: Normal heart sounds     Pulmonary:      Effort: Pulmonary effort is normal  No respiratory distress  Breath sounds: Normal breath sounds  No wheezing  Abdominal:      General: Bowel sounds are normal       Palpations: Abdomen is soft  Tenderness: There is no abdominal tenderness  There is no guarding or rebound  Musculoskeletal:         General: Normal range of motion  Cervical back: Normal range of motion and neck supple  Skin:     General: Skin is warm and dry  Findings: No rash  Neurological:      Mental Status: He is alert and oriented to person, place, and time  Psychiatric:         Mood and Affect: Mood normal          Behavior: Behavior normal          Thought Content:  Thought content normal          Judgment: Judgment normal

## 2022-02-02 ENCOUNTER — OFFICE VISIT (OUTPATIENT)
Dept: FAMILY MEDICINE CLINIC | Facility: CLINIC | Age: 77
End: 2022-02-02
Payer: COMMERCIAL

## 2022-02-02 ENCOUNTER — TELEPHONE (OUTPATIENT)
Dept: FAMILY MEDICINE CLINIC | Facility: CLINIC | Age: 77
End: 2022-02-02

## 2022-02-02 VITALS
HEIGHT: 68 IN | BODY MASS INDEX: 28.34 KG/M2 | HEART RATE: 63 BPM | OXYGEN SATURATION: 97 % | WEIGHT: 187 LBS | TEMPERATURE: 97.7 F | SYSTOLIC BLOOD PRESSURE: 128 MMHG | DIASTOLIC BLOOD PRESSURE: 78 MMHG

## 2022-02-02 DIAGNOSIS — K21.9 GASTROESOPHAGEAL REFLUX DISEASE WITHOUT ESOPHAGITIS: ICD-10-CM

## 2022-02-02 DIAGNOSIS — Z12.11 SCREENING FOR COLORECTAL CANCER: Primary | ICD-10-CM

## 2022-02-02 DIAGNOSIS — R53.83 OTHER FATIGUE: ICD-10-CM

## 2022-02-02 DIAGNOSIS — G25.81 RLS (RESTLESS LEGS SYNDROME): ICD-10-CM

## 2022-02-02 DIAGNOSIS — Z12.12 SCREENING FOR COLORECTAL CANCER: Primary | ICD-10-CM

## 2022-02-02 DIAGNOSIS — Z13.220 SCREENING CHOLESTEROL LEVEL: ICD-10-CM

## 2022-02-02 DIAGNOSIS — F41.9 ANXIETY: ICD-10-CM

## 2022-02-02 PROCEDURE — 99214 OFFICE O/P EST MOD 30 MIN: CPT | Performed by: FAMILY MEDICINE

## 2022-02-02 NOTE — TELEPHONE ENCOUNTER
Stephen Alejandro called, he said to let you know he is now going to use [employer] order if he needs any refills today

## 2022-02-04 DIAGNOSIS — G25.81 RLS (RESTLESS LEGS SYNDROME): ICD-10-CM

## 2022-02-04 DIAGNOSIS — M25.551 RIGHT HIP PAIN: ICD-10-CM

## 2022-02-04 DIAGNOSIS — K21.9 GASTROESOPHAGEAL REFLUX DISEASE WITHOUT ESOPHAGITIS: ICD-10-CM

## 2022-02-04 DIAGNOSIS — I10 ESSENTIAL HYPERTENSION: ICD-10-CM

## 2022-02-04 RX ORDER — ROPINIROLE 0.5 MG/1
0.5 TABLET, FILM COATED ORAL 3 TIMES DAILY
Qty: 270 TABLET | Refills: 1 | Status: SHIPPED | OUTPATIENT
Start: 2022-02-04 | End: 2022-08-03

## 2022-02-04 RX ORDER — CELECOXIB 200 MG/1
200 CAPSULE ORAL 2 TIMES DAILY
Qty: 90 CAPSULE | Refills: 1 | Status: SHIPPED | OUTPATIENT
Start: 2022-02-04 | End: 2022-04-08

## 2022-02-04 RX ORDER — LANSOPRAZOLE 30 MG/1
30 CAPSULE, DELAYED RELEASE ORAL DAILY
Qty: 90 CAPSULE | Refills: 1 | Status: SHIPPED | OUTPATIENT
Start: 2022-02-04 | End: 2022-08-03

## 2022-03-04 ENCOUNTER — TELEPHONE (OUTPATIENT)
Dept: FAMILY MEDICINE CLINIC | Facility: CLINIC | Age: 77
End: 2022-03-04

## 2022-03-04 NOTE — TELEPHONE ENCOUNTER
Please verify with pt his address  The form that was filled out has a different address that is in the computer       Form at  forms folder

## 2022-03-07 NOTE — TELEPHONE ENCOUNTER
Phone call from pt stating that he spoke with someone in our office and was instructed to white out wrong address and add correct address  Address in system is correct

## 2022-04-07 DIAGNOSIS — M25.551 RIGHT HIP PAIN: ICD-10-CM

## 2022-04-08 RX ORDER — CELECOXIB 200 MG/1
CAPSULE ORAL
Qty: 180 CAPSULE | Refills: 3 | Status: SHIPPED | OUTPATIENT
Start: 2022-04-08

## 2022-08-03 DIAGNOSIS — K21.9 GASTROESOPHAGEAL REFLUX DISEASE WITHOUT ESOPHAGITIS: ICD-10-CM

## 2022-08-03 DIAGNOSIS — I10 ESSENTIAL HYPERTENSION: ICD-10-CM

## 2022-08-03 DIAGNOSIS — G25.81 RLS (RESTLESS LEGS SYNDROME): ICD-10-CM

## 2022-08-03 RX ORDER — ROPINIROLE 0.5 MG/1
TABLET, FILM COATED ORAL
Qty: 270 TABLET | Refills: 3 | Status: SHIPPED | OUTPATIENT
Start: 2022-08-03

## 2022-08-03 RX ORDER — LANSOPRAZOLE 30 MG/1
CAPSULE, DELAYED RELEASE ORAL
Qty: 90 CAPSULE | Refills: 3 | Status: SHIPPED | OUTPATIENT
Start: 2022-08-03

## 2022-08-23 LAB
ALBUMIN SERPL-MCNC: 4.2 G/DL (ref 3.6–5.1)
ALBUMIN/GLOB SERPL: 1.4 (CALC) (ref 1–2.5)
ALP SERPL-CCNC: 98 U/L (ref 35–144)
ALT SERPL-CCNC: 15 U/L (ref 9–46)
APPEARANCE UR: CLEAR
AST SERPL-CCNC: 17 U/L (ref 10–35)
BACTERIA UR QL AUTO: NORMAL /HPF
BASOPHILS # BLD AUTO: 41 CELLS/UL (ref 0–200)
BASOPHILS NFR BLD AUTO: 0.7 %
BILIRUB SERPL-MCNC: 0.6 MG/DL (ref 0.2–1.2)
BILIRUB UR QL STRIP: NEGATIVE
BUN SERPL-MCNC: 12 MG/DL (ref 7–25)
BUN/CREAT SERPL: ABNORMAL (CALC) (ref 6–22)
CALCIUM SERPL-MCNC: 9.3 MG/DL (ref 8.6–10.3)
CHLORIDE SERPL-SCNC: 105 MMOL/L (ref 98–110)
CHOLEST SERPL-MCNC: 181 MG/DL
CHOLEST/HDLC SERPL: 5.5 (CALC)
CO2 SERPL-SCNC: 26 MMOL/L (ref 20–32)
COLOR UR: YELLOW
CREAT SERPL-MCNC: 1 MG/DL (ref 0.7–1.28)
EOSINOPHIL # BLD AUTO: 110 CELLS/UL (ref 15–500)
EOSINOPHIL NFR BLD AUTO: 1.9 %
ERYTHROCYTE [DISTWIDTH] IN BLOOD BY AUTOMATED COUNT: 13 % (ref 11–15)
GFR/BSA.PRED SERPLBLD CYS-BASED-ARV: 78 ML/MIN/1.73M2
GLOBULIN SER CALC-MCNC: 2.9 G/DL (CALC) (ref 1.9–3.7)
GLUCOSE SERPL-MCNC: 102 MG/DL (ref 65–99)
GLUCOSE UR QL STRIP: NEGATIVE
HCT VFR BLD AUTO: 46.5 % (ref 38.5–50)
HDLC SERPL-MCNC: 33 MG/DL
HGB BLD-MCNC: 15.6 G/DL (ref 13.2–17.1)
HGB UR QL STRIP: NEGATIVE
HYALINE CASTS #/AREA URNS LPF: NORMAL /LPF
KETONES UR QL STRIP: NEGATIVE
LDLC SERPL CALC-MCNC: 118 MG/DL (CALC)
LEUKOCYTE ESTERASE UR QL STRIP: NEGATIVE
LYMPHOCYTES # BLD AUTO: 1601 CELLS/UL (ref 850–3900)
LYMPHOCYTES NFR BLD AUTO: 27.6 %
MAGNESIUM SERPL-MCNC: 2.1 MG/DL (ref 1.5–2.5)
MCH RBC QN AUTO: 30.4 PG (ref 27–33)
MCHC RBC AUTO-ENTMCNC: 33.5 G/DL (ref 32–36)
MCV RBC AUTO: 90.6 FL (ref 80–100)
MONOCYTES # BLD AUTO: 360 CELLS/UL (ref 200–950)
MONOCYTES NFR BLD AUTO: 6.2 %
NEUTROPHILS # BLD AUTO: 3689 CELLS/UL (ref 1500–7800)
NEUTROPHILS NFR BLD AUTO: 63.6 %
NITRITE UR QL STRIP: NEGATIVE
NONHDLC SERPL-MCNC: 148 MG/DL (CALC)
PH UR STRIP: 6 [PH] (ref 5–8)
PLATELET # BLD AUTO: 198 THOUSAND/UL (ref 140–400)
PMV BLD REES-ECKER: 10.3 FL (ref 7.5–12.5)
POTASSIUM SERPL-SCNC: 4.1 MMOL/L (ref 3.5–5.3)
PROT SERPL-MCNC: 7.1 G/DL (ref 6.1–8.1)
PROT UR QL STRIP: NEGATIVE
RBC # BLD AUTO: 5.13 MILLION/UL (ref 4.2–5.8)
RBC #/AREA URNS HPF: NORMAL /HPF
SODIUM SERPL-SCNC: 138 MMOL/L (ref 135–146)
SP GR UR STRIP: 1.01 (ref 1–1.03)
SQUAMOUS #/AREA URNS HPF: NORMAL /HPF
TRIGL SERPL-MCNC: 181 MG/DL
URATE SERPL-MCNC: 5.2 MG/DL (ref 4–8)
WBC # BLD AUTO: 5.8 THOUSAND/UL (ref 3.8–10.8)
WBC #/AREA URNS HPF: NORMAL /HPF

## 2022-08-24 NOTE — PROGRESS NOTES
Falls Plan of Care: balance, strength, and gait training instructions were provided  Home safety education provided  Assessment/Plan:         Problem List Items Addressed This Visit        Digestive    GERD (gastroesophageal reflux disease) - Primary     Patient to continue with present therapy and decrease caffeine, avoid ETOH and smoking to decrease acid production  Pt should also cease eating prior to bedtime and avoid excessive fluid intake prior to sleep  May use antacids as needed for breakthrough GERD  All pateint questions answered today about this condition  Cardiovascular and Mediastinum    Essential hypertension     Patient is stable with current anti-hypertensive medicine and continue to follow a low sodium diet and take current medication  All questions about this condition were answered today  Other    RLS (restless legs syndrome)     Patient is stable  and will continue present plan of care and reassess at next routine visit  All questions about this problem from patient were answered today  Anxiety     Patient to continue utilizing medical therapy as well as counseling sources as applicable to condition  If suicidal thought or fear of suicide attempt, to call 911 and seek help immediately  Medications and therapy reviewed today and all patient  questions answered today  Other Visit Diagnoses     Well adult exam                Subjective:      Patient ID: Paris Alcocer is a 69 y o  male  This 50-year-old male here today for checkup on multiple medical problems well as Medicare wellness  Patient history of some anxiety some reflux hypertension and restless legs syndrome and arthritis  Patient is doing well with his medications although he is having some issues getting his Celebrex we decided to go through [facility] and he can get at a reduced rate he can take the Celebrex 1 today    Patient also had lab work done and is doing very well his sugars well controlled he has no problem cholesterol is liver and kidney functions are normal with no anemia  The following portions of the patient's history were reviewed and updated as appropriate:   Past Medical History:  He has a past medical history of GERD (gastroesophageal reflux disease) and Right hip pain (4/22/2021)  ,  _______________________________________________________________________  Medical Problems:  does not have any pertinent problems on file ,  _______________________________________________________________________  Past Surgical History:   has no past surgical history on file ,  _______________________________________________________________________  Family History:  Family history is unknown by patient  ,  _______________________________________________________________________  Social History:   reports that he has quit smoking  His smoking use included cigarettes  He has never used smokeless tobacco  He reports current alcohol use  He reports that he does not use drugs  ,  _______________________________________________________________________  Allergies:  has No Known Allergies     _______________________________________________________________________  Current Outpatient Medications   Medication Sig Dispense Refill    ALPRAZolam (XANAX) 0 5 mg tablet TAKE 1 TABLET 3 TIMES A DAYAS NEEDED FOR ANXIETY 90 tablet 0    celecoxib (CeleBREX) 200 mg capsule TAKE 1 CAPSULE BY MOUTH  TWICE DAILY 180 capsule 3    lansoprazole (PREVACID) 30 mg capsule TAKE 1 CAPSULE BY MOUTH  DAILY 90 capsule 3    metoprolol tartrate (LOPRESSOR) 25 mg tablet TAKE 1 TABLET BY MOUTH  TWICE DAILY 180 tablet 3    rOPINIRole (REQUIP) 0 5 mg tablet TAKE 1 TABLET BY MOUTH 3  TIMES DAILY 270 tablet 3     No current facility-administered medications for this visit      _______________________________________________________________________  Review of Systems   Constitutional: Negative for activity change, appetite change, chills, fatigue, fever and unexpected weight change  HENT: Negative for congestion, ear pain, hearing loss, mouth sores, postnasal drip, sinus pressure, sinus pain, sneezing and sore throat  Respiratory: Negative for apnea, cough, shortness of breath and wheezing  Cardiovascular: Negative for chest pain, palpitations and leg swelling  Gastrointestinal: Negative for abdominal pain, constipation, diarrhea, nausea and vomiting  Endocrine: Negative for cold intolerance and heat intolerance  Genitourinary: Negative for dysuria, frequency and hematuria  Musculoskeletal: Negative for arthralgias, back pain, gait problem, joint swelling and neck pain  Skin: Negative for rash  Neurological: Negative for dizziness, weakness and numbness  Hematological: Does not bruise/bleed easily  Psychiatric/Behavioral: Negative for agitation, behavioral problems, confusion, hallucinations and sleep disturbance  The patient is not nervous/anxious  Objective: There were no vitals filed for this visit  There is no height or weight on file to calculate BMI  Physical Exam  Vitals and nursing note reviewed  Constitutional:       Appearance: He is well-developed  HENT:      Head: Normocephalic and atraumatic  Nose: Nose normal       Mouth/Throat:      Mouth: Mucous membranes are moist    Eyes:      General: No scleral icterus  Conjunctiva/sclera: Conjunctivae normal       Pupils: Pupils are equal, round, and reactive to light  Neck:      Thyroid: No thyromegaly  Cardiovascular:      Rate and Rhythm: Normal rate and regular rhythm  Heart sounds: Normal heart sounds  Pulmonary:      Effort: Pulmonary effort is normal  No respiratory distress  Breath sounds: Normal breath sounds  No wheezing  Abdominal:      General: Bowel sounds are normal       Palpations: Abdomen is soft  Tenderness: There is no abdominal tenderness  There is no guarding or rebound     Musculoskeletal: General: Normal range of motion  Cervical back: Normal range of motion and neck supple  Skin:     General: Skin is warm and dry  Findings: No rash  Neurological:      Mental Status: He is alert and oriented to person, place, and time  Psychiatric:         Mood and Affect: Mood normal          Behavior: Behavior normal          Thought Content:  Thought content normal          Judgment: Judgment normal

## 2022-08-25 ENCOUNTER — OFFICE VISIT (OUTPATIENT)
Dept: FAMILY MEDICINE CLINIC | Facility: CLINIC | Age: 77
End: 2022-08-25
Payer: COMMERCIAL

## 2022-08-25 VITALS
WEIGHT: 185 LBS | OXYGEN SATURATION: 98 % | RESPIRATION RATE: 18 BRPM | DIASTOLIC BLOOD PRESSURE: 68 MMHG | SYSTOLIC BLOOD PRESSURE: 126 MMHG | BODY MASS INDEX: 28.04 KG/M2 | HEIGHT: 68 IN | HEART RATE: 74 BPM | TEMPERATURE: 98.2 F

## 2022-08-25 DIAGNOSIS — F41.9 ANXIETY: ICD-10-CM

## 2022-08-25 DIAGNOSIS — K21.9 GASTROESOPHAGEAL REFLUX DISEASE WITHOUT ESOPHAGITIS: Primary | ICD-10-CM

## 2022-08-25 DIAGNOSIS — I10 ESSENTIAL HYPERTENSION: ICD-10-CM

## 2022-08-25 DIAGNOSIS — Z00.00 WELL ADULT EXAM: ICD-10-CM

## 2022-08-25 DIAGNOSIS — Z23 ENCOUNTER FOR IMMUNIZATION: ICD-10-CM

## 2022-08-25 DIAGNOSIS — M25.551 RIGHT HIP PAIN: ICD-10-CM

## 2022-08-25 DIAGNOSIS — G25.81 RLS (RESTLESS LEGS SYNDROME): ICD-10-CM

## 2022-08-25 PROCEDURE — G0439 PPPS, SUBSEQ VISIT: HCPCS

## 2022-08-25 PROCEDURE — G0009 ADMIN PNEUMOCOCCAL VACCINE: HCPCS

## 2022-08-25 PROCEDURE — 99214 OFFICE O/P EST MOD 30 MIN: CPT

## 2022-08-25 PROCEDURE — 90677 PCV20 VACCINE IM: CPT

## 2022-08-25 RX ORDER — CELECOXIB 200 MG/1
200 CAPSULE ORAL DAILY
Qty: 30 CAPSULE | Refills: 5 | Status: SHIPPED | OUTPATIENT
Start: 2022-08-25

## 2022-08-25 NOTE — PROGRESS NOTES
Assessment and Plan:     Problem List Items Addressed This Visit        Digestive    GERD (gastroesophageal reflux disease) - Primary     Patient to continue with present therapy and decrease caffeine, avoid ETOH and smoking to decrease acid production  Pt should also cease eating prior to bedtime and avoid excessive fluid intake prior to sleep  May use antacids as needed for breakthrough GERD  All pateint questions answered today about this condition  Cardiovascular and Mediastinum    Essential hypertension     Patient is stable with current anti-hypertensive medicine and continue to follow a low sodium diet and take current medication  All questions about this condition were answered today  Other    RLS (restless legs syndrome)     Patient is stable  and will continue present plan of care and reassess at next routine visit  All questions about this problem from patient were answered today  Anxiety     Patient to continue utilizing medical therapy as well as counseling sources as applicable to condition  If suicidal thought or fear of suicide attempt, to call 911 and seek help immediately  Medications and therapy reviewed today and all patient  questions answered today  Other Visit Diagnoses     Well adult exam        Encounter for immunization               Preventive health issues were discussed with patient, and age appropriate screening tests were ordered as noted in patient's After Visit Summary  Personalized health advice and appropriate referrals for health education or preventive services given if needed, as noted in patient's After Visit Summary       History of Present Illness:     Patient presents for a Medicare Wellness Visit    HPI   Patient Care Team:  Severa Bos, MD as PCP - General (Family Medicine)  Severa Bos, MD as PCP - [address] [place], [place] [id number] [facility]     Review of Systems:     Review of Systems     Problem List:     Patient Active Problem List Diagnosis    RLS (restless legs syndrome)    Essential hypertension    Anxiety    GERD (gastroesophageal reflux disease)    Right hip pain      Past Medical and Surgical History:     Past Medical History:   Diagnosis Date    GERD (gastroesophageal reflux disease)     Right hip pain 2021     History reviewed  No pertinent surgical history  Family History:     Family History   Family history unknown: Yes      Social History:     Social History     Socioeconomic History    Marital status: /Civil Union     Spouse name: None    Number of children: None    Years of education: None    Highest education level: None   Occupational History    Occupation: Retired   Tobacco Use    Smoking status: Former Smoker     Types: Cigarettes    Smokeless tobacco: Never Used    Tobacco comment: per [employer]   Substance and Sexual Activity    Alcohol use: Yes     Comment: occasional    Drug use: Never     Comment: No use    Sexual activity: None   Other Topics Concern    None   Social History Narrative    · Most recent tobacco use screenin2019      · Do you currently or have you served in [employer]: Yes      · If Yes, What branch of service:   [employer]      · 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, into active duty, as a member of the [employer] or as a Reservist:   Yes      · Occupation:   Retired      · Marital status:         · Sexual orientation:   Heterosexual      · Exercise level:   None      · Alcohol intake:   Occasional      · Caffeine intake:   None      · Chewing tobacco:   none      · Guns present in home: Yes      · Seat belts used routinely:   Yes      · Sunscreen used routinely:   Yes      · Smoke alarm in home:    Yes      · Advance directive:   No  19      Social Determinants of Health     Financial Resource Strain: Not on file   Food Insecurity: Not on file   Transportation Needs: Not on file   Physical Activity: Not on file   Stress: Not on file   Social Connections: Not on file   Intimate Partner Violence: Not on file   Housing Stability: Not on file      Medications and Allergies:     Current Outpatient Medications   Medication Sig Dispense Refill    ALPRAZolam (XANAX) 0 5 mg tablet TAKE 1 TABLET 3 TIMES A DAYAS NEEDED FOR ANXIETY 90 tablet 0    celecoxib (CeleBREX) 200 mg capsule TAKE 1 CAPSULE BY MOUTH  TWICE DAILY 180 capsule 3    lansoprazole (PREVACID) 30 mg capsule TAKE 1 CAPSULE BY MOUTH  DAILY 90 capsule 3    metoprolol tartrate (LOPRESSOR) 25 mg tablet TAKE 1 TABLET BY MOUTH  TWICE DAILY 180 tablet 3    rOPINIRole (REQUIP) 0 5 mg tablet TAKE 1 TABLET BY MOUTH 3  TIMES DAILY 270 tablet 3     No current facility-administered medications for this visit  No Known Allergies   Immunizations:     Immunization History   Administered Date(s) Administered    COVID-19 MODERNA VACC 0 5 ML IM 02/03/2021, 03/03/2021    INFLUENZA 11/02/2006, 10/08/2008, 12/08/2010, 02/29/2012, 11/30/2012, 09/25/2013, 10/29/2014, 09/01/2015, 01/03/2018, 12/20/2018    Influenza Quadrivalent Preservative Free 3 years and older IM 10/29/2019    Influenza Quadrivalent Preservative Free ID 02/11/2022    Influenza, high dose seasonal 0 7 mL 10/22/2020    Influenza, injectable, quadrivalent, preservative free 0 5 mL 10/29/2019    Influenza, seasonal, injectable, preservative free 11/15/2016    Pneumococcal 02/29/2012    Pneumococcal Conjugate 13-Valent 04/06/2017, 01/03/2018    Td (adult), Unspecified 01/01/2017    Tdap 01/28/2013, 04/06/2017      Health Maintenance:         Topic Date Due    Hepatitis C Screening  Completed         Topic Date Due    Pneumococcal Vaccine: 65+ Years (2 - PPSV23 or PCV20) 01/03/2019    COVID-19 Vaccine (3 - Booster for Moderna series) 08/03/2021    Influenza Vaccine (1) 09/01/2022      Medicare Screening Tests and Risk Assessments:     Gretchen Morel is here for his Subsequent Wellness visit   Last Medicare Wellness visit information reviewed, patient interviewed and updates made to the record today  Health Risk Assessment:   Patient rates overall health as good  Patient feels that their physical health rating is same  Patient is satisfied with their life  Eyesight was rated as same  Hearing was rated as same  Patient feels that their emotional and mental health rating is same  Patients states they are never, rarely angry  Patient states they are never, rarely unusually tired/fatigued  Pain experienced in the last 7 days has been some  Patient's pain rating has been 2/10  Patient states that he has experienced no weight loss or gain in last 6 months  Depression Screening:   PHQ-2 Score: 0      Fall Risk Screening: In the past year, patient has experienced: history of falling in past year    Number of falls: 1  Injured during fall?: No    Feels unsteady when standing or walking?: No    Worried about falling?: No      Home Safety:  Patient does not have trouble with stairs inside or outside of their home  Patient has working smoke alarms and has working carbon monoxide detector  Home safety hazards include: none  Nutrition:   Current diet is Regular  Medications:   Patient is not currently taking any over-the-counter supplements  Patient is able to manage medications  Activities of Daily Living (ADLs)/Instrumental Activities of Daily Living (IADLs):   Walk and transfer into and out of bed and chair?: Yes  Dress and groom yourself?: Yes    Bathe or shower yourself?: Yes    Feed yourself?  Yes  Do your laundry/housekeeping?: Yes  Manage your money, pay your bills and track your expenses?: Yes  Make your own meals?: Yes    Do your own shopping?: Yes    Previous Hospitalizations:   Any hospitalizations or ED visits within the last 12 months?: No      Advance Care Planning:   Living will: No      Cognitive Screening:   Provider or family/friend/caregiver concerned regarding cognition?: No    PREVENTIVE SCREENINGS      Cardiovascular Screening:    General: Screening Current      Diabetes Screening:     General: Screening Current      Prostate Cancer Screening:    General: Screening Not Indicated      Abdominal Aortic Aneurysm (AAA) Screening:    Risk factors include: tobacco use        Lung Cancer Screening:     General: Screening Not Indicated      Hepatitis C Screening:    General: Screening Current    Screening, Brief Intervention, and Referral to Treatment (SBIRT)    Screening  Typical number of drinks in a day: 0  Typical number of drinks in a week: 0  Interpretation: Low risk drinking behavior      Single Item Drug Screening:  How often have you used an illegal drug (including marijuana) or a prescription medication for non-medical reasons in the past year? never    Single Item Drug Screen Score: 0  Interpretation: Negative screen for possible drug use disorder    No exam data present     Physical Exam:     /68 (BP Location: Left arm, Patient Position: Sitting, Cuff Size: Standard)   Pulse 74   Temp 98 2 °F (36 8 °C)   Resp 18   Ht 5' 8" (1 727 m)   Wt 83 9 kg (185 lb)   SpO2 98%   BMI 28 13 kg/m²     Physical Exam     Staci Goodwin MD

## 2022-08-25 NOTE — PATIENT INSTRUCTIONS
Medicare Preventive Visit Patient Instructions  Thank you for completing your Welcome to Medicare Visit or Medicare Annual Wellness Visit today  Your next wellness visit will be due in one year (8/26/2023)  The screening/preventive services that you may require over the next 5-10 years are detailed below  Some tests may not apply to you based off risk factors and/or age  Screening tests ordered at today's visit but not completed yet may show as past due  Also, please note that scanned in results may not display below  Preventive Screenings:  Service Recommendations Previous Testing/Comments   Colorectal Cancer Screening  · Colonoscopy    · Fecal Occult Blood Test (FOBT)/Fecal Immunochemical Test (FIT)  · Fecal DNA/Cologuard Test  · Flexible Sigmoidoscopy Age: 39-70 years old   Colonoscopy: every 10 years (May be performed more frequently if at higher risk)  OR  FOBT/FIT: every 1 year  OR  Cologuard: every 3 years  OR  Sigmoidoscopy: every 5 years  Screening may be recommended earlier than age 39 if at higher risk for colorectal cancer  Also, an individualized decision between you and your healthcare provider will decide whether screening between the ages of 74-80 would be appropriate   Colonoscopy: Not on file  FOBT/FIT: Not on file  Cologuard: Not on file  Sigmoidoscopy: Not on file          Prostate Cancer Screening Individualized decision between patient and health care provider in men between ages of 53-78   Medicare will cover every 12 months beginning on the day after your 50th birthday PSA: 0 3 ng/mL     Screening Not Indicated     Hepatitis C Screening Once for adults born between 1945 and 1965  More frequently in patients at high risk for Hepatitis C Hep C Antibody: 03/26/2021    Screening Current   Diabetes Screening 1-2 times per year if you're at risk for diabetes or have pre-diabetes Fasting glucose: No results in last 5 years (No results in last 5 years)  A1C: No results in last 5 years (No results in last 5 years)  Screening Current   Cholesterol Screening Once every 5 years if you don't have a lipid disorder  May order more often based on risk factors  Lipid panel: 08/22/2022  Screening Current      Other Preventive Screenings Covered by Medicare:  1  Abdominal Aortic Aneurysm (AAA) Screening: covered once if your at risk  You're considered to be at risk if you have a family history of AAA or a male between the age of 73-68 who smoking at least 100 cigarettes in your lifetime  2  Lung Cancer Screening: covers low dose CT scan once per year if you meet all of the following conditions: (1) Age 50-69; (2) No signs or symptoms of lung cancer; (3) Current smoker or have quit smoking within the last 15 years; (4) You have a tobacco smoking history of at least 20 pack years (packs per day x number of years you smoked); (5) You get a written order from a healthcare provider  3  Glaucoma Screening: covered annually if you're considered high risk: (1) You have diabetes OR (2) Family history of glaucoma OR (3)  aged 48 and older OR (3)  American aged 72 and older  3  Osteoporosis Screening: covered every 2 years if you meet one of the following conditions: (1) Have a vertebral abnormality; (2) On glucocorticoid therapy for more than 3 months; (3) Have primary hyperparathyroidism; (4) On osteoporosis medications and need to assess response to drug therapy  5  HIV Screening: covered annually if you're between the age of 12-76  Also covered annually if you are younger than 13 and older than 72 with risk factors for HIV infection  For pregnant patients, it is covered up to 3 times per pregnancy      Immunizations:  Immunization Recommendations   Influenza Vaccine Annual influenza vaccination during flu season is recommended for all persons aged >= 6 months who do not have contraindications   Pneumococcal Vaccine   * Pneumococcal conjugate vaccine = PCV13 (Prevnar 13), PCV15 (Vaxneuvance), PCV20 (Prevnar 20)  * Pneumococcal polysaccharide vaccine = PPSV23 (Pneumovax) Adults 2364 years old: 1-3 doses may be recommended based on certain risk factors  Adults 72 years old: 1-2 doses may be recommended based off what pneumonia vaccine you previously received   Hepatitis B Vaccine 3 dose series if at intermediate or high risk (ex: diabetes, end stage renal disease, liver disease)   Tetanus (Td) Vaccine - COST NOT COVERED BY MEDICARE PART B Following completion of primary series, a booster dose should be given every 10 years to maintain immunity against tetanus  Td may also be given as tetanus wound prophylaxis  Tdap Vaccine - COST NOT COVERED BY MEDICARE PART B Recommended at least once for all adults  For pregnant patients, recommended with each pregnancy  Shingles Vaccine (Shingrix) - COST NOT COVERED BY MEDICARE PART B  2 shot series recommended in those aged 48 and above     Health Maintenance Due:      Topic Date Due    Hepatitis C Screening  Completed     Immunizations Due:      Topic Date Due    Pneumococcal Vaccine: 65+ Years (2 - PPSV23 or PCV20) 01/03/2019    COVID-19 Vaccine (3 - Booster for Moderna series) 08/03/2021    Influenza Vaccine (1) 09/01/2022     Advance Directives   What are advance directives? Advance directives are legal documents that state your wishes and plans for medical care  These plans are made ahead of time in case you lose your ability to make decisions for yourself  Advance directives can apply to any medical decision, such as the treatments you want, and if you want to donate organs  What are the types of advance directives? There are many types of advance directives, and each state has rules about how to use them  You may choose a combination of any of the following:  · Living will: This is a written record of the treatment you want  You can also choose which treatments you do not want, which to limit, and which to stop at a certain time   This includes surgery, medicine, IV fluid, and tube feedings  · Durable power of  for healthcare [facility]): This is a written record that states who you want to make healthcare choices for you when you are unable to make them for yourself  This person, called a proxy, is usually a family member or a friend  You may choose more than 1 proxy  · Do not resuscitate (DNR) order:  A DNR order is used in case your heart stops beating or you stop breathing  It is a request not to have certain forms of treatment, such as CPR  A DNR order may be included in other types of advance directives  · Medical directive: This covers the care that you want if you are in a coma, near death, or unable to make decisions for yourself  You can list the treatments you want for each condition  Treatment may include pain medicine, surgery, blood transfusions, dialysis, IV or tube feedings, and a ventilator (breathing machine)  · Values history: This document has questions about your views, beliefs, and how you feel and think about life  This information can help others choose the care that you would choose  Why are advance directives important? An advance directive helps you control your care  Although spoken wishes may be used, it is better to have your wishes written down  Spoken wishes can be misunderstood, or not followed  Treatments may be given even if you do not want them  An advance directive may make it easier for your family to make difficult choices about your care  Fall Prevention    Fall prevention  includes ways to make your home and other areas safer  It also includes ways you can move more carefully to prevent a fall  Health conditions that cause changes in your blood pressure, vision, or muscle strength and coordination may increase your risk for falls  Medicines may also increase your risk for falls if they make you dizzy, weak, or sleepy  Fall prevention tips:   · Stand or sit up slowly  · Use assistive devices as directed  · Wear shoes that fit well and have soles that   · Wear a personal alarm  · Stay active  · Manage your medical conditions  Home Safety Tips:  · Add items to prevent falls in the bathroom  · Keep paths clear  · Install bright lights in your home  · Keep items you use often on shelves within reach  · Paint or place reflective tape on the edges of your stairs  Weight Management   Why it is important to manage your weight:  Being overweight increases your risk of health conditions such as heart disease, high blood pressure, type 2 diabetes, and certain types of cancer  It can also increase your risk for osteoarthritis, sleep apnea, and other respiratory problems  Aim for a slow, steady weight loss  Even a small amount of weight loss can lower your risk of health problems  How to lose weight safely:  A safe and healthy way to lose weight is to eat fewer calories and get regular exercise  You can lose up about 1 pound a week by decreasing the number of calories you eat by 500 calories each day  Healthy meal plan for weight management:  A healthy meal plan includes a variety of foods, contains fewer calories, and helps you stay healthy  A healthy meal plan includes the following:  · Eat whole-grain foods more often  A healthy meal plan should contain fiber  Fiber is the part of grains, fruits, and vegetables that is not broken down by your body  Whole-grain foods are healthy and provide extra fiber in your diet  Some examples of whole-grain foods are whole-wheat breads and pastas, oatmeal, brown rice, and bulgur  · Eat a variety of vegetables every day  Include dark, leafy greens such as spinach, kale, odessa greens, and mustard greens  Eat yellow and orange vegetables such as carrots, sweet potatoes, and winter squash  · Eat a variety of fruits every day  Choose fresh or canned fruit (canned in its own juice or light syrup) instead of juice   Fruit juice has very little or no fiber   · Eat low-fat dairy foods  Drink fat-free (skim) milk or 1% milk  Eat fat-free yogurt and low-fat cottage cheese  Try low-fat cheeses such as mozzarella and other reduced-fat cheeses  · Choose meat and other protein foods that are low in fat  Choose beans or other legumes such as split peas or lentils  Choose fish, skinless poultry (chicken or turkey), or lean cuts of red meat (beef or pork)  Before you cook meat or poultry, cut off any visible fat  · Use less fat and oil  Try baking foods instead of frying them  Add less fat, such as margarine, sour cream, regular salad dressing and mayonnaise to foods  Eat fewer high-fat foods  Some examples of high-fat foods include french fries, doughnuts, ice cream, and cakes  · Eat fewer sweets  Limit foods and drinks that are high in sugar  This includes candy, cookies, regular soda, and sweetened drinks  Exercise:  Exercise at least 30 minutes per day on most days of the week  Some examples of exercise include walking, biking, dancing, and swimming  You can also fit in more physical activity by taking the stairs instead of the elevator or parking farther away from stores  Ask your healthcare provider about the best exercise plan for you  © Copyright [employer] 2018 Information is for End User's use only and may not be sold, redistributed or otherwise used for commercial purposes   All illustrations and images included in CareNotes® are the copyrighted property of A D A M , Inc  or [address] [place] [address]

## 2022-11-25 ENCOUNTER — CLINICAL SUPPORT (OUTPATIENT)
Dept: FAMILY MEDICINE CLINIC | Facility: CLINIC | Age: 77
End: 2022-11-25

## 2022-11-25 DIAGNOSIS — Z23 IMMUNIZATION DUE: Primary | ICD-10-CM

## 2023-02-07 DIAGNOSIS — M25.551 RIGHT HIP PAIN: ICD-10-CM

## 2023-02-07 DIAGNOSIS — I10 ESSENTIAL HYPERTENSION: ICD-10-CM

## 2023-02-07 DIAGNOSIS — K21.9 GASTROESOPHAGEAL REFLUX DISEASE WITHOUT ESOPHAGITIS: ICD-10-CM

## 2023-02-07 DIAGNOSIS — G25.81 RLS (RESTLESS LEGS SYNDROME): ICD-10-CM

## 2023-02-07 RX ORDER — ROPINIROLE 0.5 MG/1
0.5 TABLET, FILM COATED ORAL 3 TIMES DAILY
Qty: 270 TABLET | Refills: 3 | Status: SHIPPED | OUTPATIENT
Start: 2023-02-07

## 2023-02-07 RX ORDER — LANSOPRAZOLE 30 MG/1
30 CAPSULE, DELAYED RELEASE ORAL DAILY
Qty: 90 CAPSULE | Refills: 3 | Status: SHIPPED | OUTPATIENT
Start: 2023-02-07

## 2023-02-07 RX ORDER — CELECOXIB 200 MG/1
200 CAPSULE ORAL DAILY
Qty: 30 CAPSULE | Refills: 5 | Status: SHIPPED | OUTPATIENT
Start: 2023-02-07

## 2023-02-23 DIAGNOSIS — I10 ESSENTIAL HYPERTENSION: ICD-10-CM

## 2023-02-23 DIAGNOSIS — M25.551 RIGHT HIP PAIN: ICD-10-CM

## 2023-02-23 DIAGNOSIS — G25.81 RLS (RESTLESS LEGS SYNDROME): ICD-10-CM

## 2023-02-23 DIAGNOSIS — K21.9 GASTROESOPHAGEAL REFLUX DISEASE WITHOUT ESOPHAGITIS: ICD-10-CM

## 2023-02-23 RX ORDER — CELECOXIB 200 MG/1
200 CAPSULE ORAL DAILY
Qty: 30 CAPSULE | Refills: 5 | Status: SHIPPED | OUTPATIENT
Start: 2023-02-23

## 2023-02-23 RX ORDER — LANSOPRAZOLE 30 MG/1
30 CAPSULE, DELAYED RELEASE ORAL DAILY
Qty: 90 CAPSULE | Refills: 3 | Status: SHIPPED | OUTPATIENT
Start: 2023-02-23

## 2023-02-23 RX ORDER — ROPINIROLE 0.5 MG/1
0.5 TABLET, FILM COATED ORAL 3 TIMES DAILY
Qty: 270 TABLET | Refills: 3 | Status: SHIPPED | OUTPATIENT
Start: 2023-02-23

## 2023-02-24 ENCOUNTER — RA CDI HCC (OUTPATIENT)
Dept: OTHER | Facility: HOSPITAL | Age: 78
End: 2023-02-24

## 2023-02-24 NOTE — PROGRESS NOTES
[facility]  coding opportunities       Chart reviewed, no opportunity found:   [address]        Patients Insurance     Medicare Insurance: [employer] Advantage

## 2023-03-02 ENCOUNTER — OFFICE VISIT (OUTPATIENT)
Dept: FAMILY MEDICINE CLINIC | Facility: CLINIC | Age: 78
End: 2023-03-02

## 2023-03-02 VITALS
DIASTOLIC BLOOD PRESSURE: 62 MMHG | BODY MASS INDEX: 28.34 KG/M2 | OXYGEN SATURATION: 97 % | WEIGHT: 187 LBS | HEART RATE: 61 BPM | TEMPERATURE: 98.1 F | SYSTOLIC BLOOD PRESSURE: 122 MMHG | HEIGHT: 68 IN

## 2023-03-02 DIAGNOSIS — K21.9 GASTROESOPHAGEAL REFLUX DISEASE WITHOUT ESOPHAGITIS: ICD-10-CM

## 2023-03-02 DIAGNOSIS — F41.9 ANXIETY: ICD-10-CM

## 2023-03-02 DIAGNOSIS — G25.81 RLS (RESTLESS LEGS SYNDROME): Primary | ICD-10-CM

## 2023-03-02 DIAGNOSIS — I10 ESSENTIAL HYPERTENSION: ICD-10-CM

## 2023-03-02 NOTE — PROGRESS NOTES
Name: Tonia Burt      :       MRN: [id number]  Encounter Provider: Edwar Haddad MD  Encounter Date: 3/2/2023   Encounter department: [address] [place] [address]    Assessment & Plan     1  RLS (restless legs syndrome)  Assessment & Plan:  Patient is stable  and will continue present plan of care and reassess at next routine visit  All questions about this problem from patient were answered today  2  Essential hypertension  Assessment & Plan:  Patient is stable with current anti-hypertensive medicine and continue to follow a low sodium diet and take current medication  All questions about this condition were answered today  3  Anxiety  Assessment & Plan:  Patient to continue utilizing medical therapy as well as counseling sources as applicable to condition  If suicidal thought or fear of suicide attempt, to call 911 and seek help immediately  Medications and therapy reviewed today and all patient  questions answered today  4  Gastroesophageal reflux disease without esophagitis  Assessment & Plan:  Patient to continue with present therapy and decrease caffeine, avoid ETOH and smoking to decrease acid production  Pt should also cease eating prior to bedtime and avoid excessive fluid intake prior to sleep  May use antacids as needed for breakthrough GERD  All pateint questions answered today about this condition  BMI Counseling: There is no height or weight on file to calculate BMI  The BMI is above normal  Nutrition recommendations include decreasing portion sizes, encouraging healthy choices of fruits and vegetables, decreasing fast food intake, consuming healthier snacks, limiting drinks that contain sugar, moderation in carbohydrate intake, increasing intake of lean protein, reducing intake of saturated and trans fat and reducing intake of cholesterol  Exercise recommendations include exercising 3-5 times per week  No pharmacotherapy was ordered   Patient referred to PCP  Rationale for BMI follow-up plan is due to patient being overweight or obese  Falls Plan of Care: balance, strength, and gait training instructions were provided  Home safety education provided  Subjective     72-year-old male here today for checkup of multiple medical positive is hypertension anxiety restless leg syndrome and is actually doing quite well he is due for his yearly lab work his next visit this patient takes good care of himself eats well very well and is doing well within all of his parameters  Review of Systems    Past Medical History:   Diagnosis Date   • GERD (gastroesophageal reflux disease)    • Right hip pain 2021     History reviewed  No pertinent surgical history  Family History   Family history unknown: Yes     Social History     Socioeconomic History   • Marital status: /Civil Union     Spouse name: None   • Number of children: None   • Years of education: None   • Highest education level: None   Occupational History   • Occupation: Retired   Tobacco Use   • Smoking status: Former     Types: Cigarettes   • Smokeless tobacco: Never   • Tobacco comments:     per [employer] Use   • Vaping Use: Never used   Substance and Sexual Activity   • Alcohol use: Yes     Comment: occasional   • Drug use: Never     Comment: No use   • Sexual activity: None   Other Topics Concern   • None   Social History Narrative    · Most recent tobacco use screenin2019      · Do you currently or have you served in [employer]: Yes      · If Yes, What branch of service:   [employer]      · 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, into active duty, as a member of the [employer] or as a Reservist:   Yes      · Occupation:   Retired      · Marital status:         · Sexual orientation:   Heterosexual      · Exercise level:   None      · Alcohol intake:   Occasional      · Caffeine intake:   None      · Chewing tobacco:   none      · Guns present in home:    Yes      · Seat belts used routinely:   Yes      · Sunscreen used routinely:   Yes      · Smoke alarm in home:    Yes      · Advance directive:   No  11/26/19      Social Determinants of Health     Financial Resource Strain: Not on file   Food Insecurity: Not on file   Transportation Needs: Not on file   Physical Activity: Not on file   Stress: Not on file   Social Connections: Not on file   Intimate Partner Violence: Not on file   Housing Stability: Not on file     Current Outpatient Medications on File Prior to Visit   Medication Sig   • ALPRAZolam (XANAX) 0 5 mg tablet TAKE 1 TABLET 3 TIMES A DAYAS NEEDED FOR ANXIETY   • celecoxib (CeleBREX) 200 mg capsule Take 1 capsule (200 mg total) by mouth daily   • lansoprazole (PREVACID) 30 mg capsule Take 1 capsule (30 mg total) by mouth daily   • metoprolol tartrate (LOPRESSOR) 25 mg tablet Take 1 tablet (25 mg total) by mouth 2 (two) times a day   • rOPINIRole (REQUIP) 0 5 mg tablet Take 1 tablet (0 5 mg total) by mouth 3 (three) times a day     No Known Allergies  Immunization History   Administered Date(s) Administered   • COVID-19 MODERNA VACC 0 5 ML IM 02/03/2021, 03/03/2021   • INFLUENZA 11/02/2006, 10/08/2008, 12/08/2010, 02/29/2012, 11/30/2012, 09/25/2013, 10/29/2014, 09/01/2015, 01/03/2018, 12/20/2018   • Influenza Quadrivalent Preservative Free 3 years and older IM 10/29/2019   • Influenza Quadrivalent Preservative Free ID 02/11/2022   • Influenza, high dose seasonal 0 7 mL 10/22/2020, 11/25/2022   • Influenza, injectable, quadrivalent, preservative free 0 5 mL 10/29/2019   • Influenza, seasonal, injectable, preservative free 11/15/2016   • Pneumococcal 02/29/2012   • Pneumococcal Conjugate 13-Valent 04/06/2017, 01/03/2018   • Pneumococcal Conjugate Vaccine 20-valent (Pcv20), Polysace 08/25/2022   • Td (adult), Unspecified 01/01/2017   • Tdap 01/28/2013, 04/06/2017       Objective     /62 (BP Location: Left arm, Patient Position: Sitting, Cuff Size: Large)   Pulse 61   Temp 98 1 °F (36 7 °C)   Ht 5' 8" (1 727 m)   Wt 84 8 kg (187 lb)   SpO2 97%   BMI 28 43 kg/m²     Physical Exam  Bryan Grove MD

## 2023-05-11 ENCOUNTER — TELEPHONE (OUTPATIENT)
Dept: FAMILY MEDICINE CLINIC | Facility: CLINIC | Age: 78
End: 2023-05-11

## 2023-05-11 NOTE — TELEPHONE ENCOUNTER
Patient's daughter called and stated that she is going to take her parents (Romulo Silverman) traveling to the [facility] and wants to know if they would be ok to travel with the [facility]  Thinks about 3600 ft  Spoke with Dr Ayanna Savage and he said that they should be fine  Yolande [phone] notified

## 2023-06-09 LAB
ALBUMIN SERPL-MCNC: 4 G/DL (ref 3.6–5.1)
ALBUMIN/GLOB SERPL: 1.4 (CALC) (ref 1–2.5)
ALP SERPL-CCNC: 96 U/L (ref 35–144)
ALT SERPL-CCNC: 14 U/L (ref 9–46)
APPEARANCE UR: CLEAR
AST SERPL-CCNC: 17 U/L (ref 10–35)
BACTERIA UR QL AUTO: NORMAL /HPF
BASOPHILS # BLD AUTO: 31 CELLS/UL (ref 0–200)
BASOPHILS NFR BLD AUTO: 0.6 %
BILIRUB SERPL-MCNC: 0.7 MG/DL (ref 0.2–1.2)
BILIRUB UR QL STRIP: NEGATIVE
BUN SERPL-MCNC: 16 MG/DL (ref 7–25)
BUN/CREAT SERPL: ABNORMAL (CALC) (ref 6–22)
CALCIUM SERPL-MCNC: 9.1 MG/DL (ref 8.6–10.3)
CHLORIDE SERPL-SCNC: 105 MMOL/L (ref 98–110)
CHOLEST SERPL-MCNC: 194 MG/DL
CHOLEST/HDLC SERPL: 5.7 (CALC)
CO2 SERPL-SCNC: 27 MMOL/L (ref 20–32)
COLOR UR: YELLOW
CREAT SERPL-MCNC: 0.97 MG/DL (ref 0.7–1.28)
EOSINOPHIL # BLD AUTO: 130 CELLS/UL (ref 15–500)
EOSINOPHIL NFR BLD AUTO: 2.5 %
ERYTHROCYTE [DISTWIDTH] IN BLOOD BY AUTOMATED COUNT: 13.1 % (ref 11–15)
GFR/BSA.PRED SERPLBLD CYS-BASED-ARV: 80 ML/MIN/1.73M2
GLOBULIN SER CALC-MCNC: 2.8 G/DL (CALC) (ref 1.9–3.7)
GLUCOSE SERPL-MCNC: 101 MG/DL (ref 65–99)
GLUCOSE UR QL STRIP: NEGATIVE
HCT VFR BLD AUTO: 44.7 % (ref 38.5–50)
HDLC SERPL-MCNC: 34 MG/DL
HGB BLD-MCNC: 15.1 G/DL (ref 13.2–17.1)
HGB UR QL STRIP: NEGATIVE
HYALINE CASTS #/AREA URNS LPF: NORMAL /LPF
KETONES UR QL STRIP: NEGATIVE
LDLC SERPL CALC-MCNC: 119 MG/DL (CALC)
LEUKOCYTE ESTERASE UR QL STRIP: NEGATIVE
LYMPHOCYTES # BLD AUTO: 1154 CELLS/UL (ref 850–3900)
LYMPHOCYTES NFR BLD AUTO: 22.2 %
MAGNESIUM SERPL-MCNC: 2.2 MG/DL (ref 1.5–2.5)
MCH RBC QN AUTO: 30.7 PG (ref 27–33)
MCHC RBC AUTO-ENTMCNC: 33.8 G/DL (ref 32–36)
MCV RBC AUTO: 90.9 FL (ref 80–100)
MONOCYTES # BLD AUTO: 400 CELLS/UL (ref 200–950)
MONOCYTES NFR BLD AUTO: 7.7 %
NEUTROPHILS # BLD AUTO: 3484 CELLS/UL (ref 1500–7800)
NEUTROPHILS NFR BLD AUTO: 67 %
NITRITE UR QL STRIP: NEGATIVE
NONHDLC SERPL-MCNC: 160 MG/DL (CALC)
PH UR STRIP: 6.5 [PH] (ref 5–8)
PLATELET # BLD AUTO: 191 THOUSAND/UL (ref 140–400)
PMV BLD REES-ECKER: 10.4 FL (ref 7.5–12.5)
POTASSIUM SERPL-SCNC: 4.4 MMOL/L (ref 3.5–5.3)
PROT SERPL-MCNC: 6.8 G/DL (ref 6.1–8.1)
PROT UR QL STRIP: NEGATIVE
RBC # BLD AUTO: 4.92 MILLION/UL (ref 4.2–5.8)
RBC #/AREA URNS HPF: NORMAL /HPF
SODIUM SERPL-SCNC: 138 MMOL/L (ref 135–146)
SP GR UR STRIP: 1.01 (ref 1–1.03)
SQUAMOUS #/AREA URNS HPF: NORMAL /HPF
TRIGL SERPL-MCNC: 265 MG/DL
TSH SERPL-ACNC: 3.13 MIU/L (ref 0.4–4.5)
URATE SERPL-MCNC: 4.5 MG/DL (ref 4–8)
WBC # BLD AUTO: 5.2 THOUSAND/UL (ref 3.8–10.8)
WBC #/AREA URNS HPF: NORMAL /HPF

## 2023-07-25 DIAGNOSIS — M25.551 RIGHT HIP PAIN: ICD-10-CM

## 2023-07-25 RX ORDER — CELECOXIB 200 MG/1
200 CAPSULE ORAL DAILY
Qty: 90 CAPSULE | Refills: 0 | Status: SHIPPED | OUTPATIENT
Start: 2023-07-25

## 2023-10-20 ENCOUNTER — OFFICE VISIT (OUTPATIENT)
Dept: FAMILY MEDICINE CLINIC | Facility: CLINIC | Age: 78
End: 2023-10-20
Payer: COMMERCIAL

## 2023-10-20 VITALS
HEIGHT: 68 IN | DIASTOLIC BLOOD PRESSURE: 67 MMHG | OXYGEN SATURATION: 97 % | HEART RATE: 66 BPM | SYSTOLIC BLOOD PRESSURE: 133 MMHG | TEMPERATURE: 99.7 F | BODY MASS INDEX: 27.74 KG/M2 | WEIGHT: 183 LBS

## 2023-10-20 DIAGNOSIS — E03.4 HYPOTHYROIDISM DUE TO ACQUIRED ATROPHY OF THYROID: ICD-10-CM

## 2023-10-20 DIAGNOSIS — G25.81 RLS (RESTLESS LEGS SYNDROME): ICD-10-CM

## 2023-10-20 DIAGNOSIS — I10 ESSENTIAL HYPERTENSION: ICD-10-CM

## 2023-10-20 DIAGNOSIS — K21.9 GASTROESOPHAGEAL REFLUX DISEASE WITHOUT ESOPHAGITIS: ICD-10-CM

## 2023-10-20 DIAGNOSIS — Z23 ENCOUNTER FOR IMMUNIZATION: ICD-10-CM

## 2023-10-20 DIAGNOSIS — Z00.00 WELL ADULT EXAM: Primary | ICD-10-CM

## 2023-10-20 DIAGNOSIS — F41.9 ANXIETY: ICD-10-CM

## 2023-10-20 DIAGNOSIS — M25.551 RIGHT HIP PAIN: ICD-10-CM

## 2023-10-20 PROCEDURE — 99214 OFFICE O/P EST MOD 30 MIN: CPT | Performed by: FAMILY MEDICINE

## 2023-10-20 PROCEDURE — G0439 PPPS, SUBSEQ VISIT: HCPCS | Performed by: FAMILY MEDICINE

## 2023-10-20 PROCEDURE — 90662 IIV NO PRSV INCREASED AG IM: CPT | Performed by: FAMILY MEDICINE

## 2023-10-20 PROCEDURE — G0008 ADMIN INFLUENZA VIRUS VAC: HCPCS | Performed by: FAMILY MEDICINE

## 2023-10-20 RX ORDER — LEVOTHYROXINE SODIUM 0.03 MG/1
25 TABLET ORAL DAILY
COMMUNITY
Start: 2023-07-12

## 2023-10-20 RX ORDER — CELECOXIB 200 MG/1
200 CAPSULE ORAL 2 TIMES DAILY
Qty: 180 CAPSULE | Refills: 1 | Status: SHIPPED | OUTPATIENT
Start: 2023-10-20

## 2023-10-20 NOTE — PROGRESS NOTES
Assessment and Plan:     Problem List Items Addressed This Visit     RLS (restless legs syndrome)     Patient is stable  and will continue present plan of care and reassess at next routine visit. All questions about this problem from patient were answered today. Essential hypertension     Patient is stable with current anti-hypertensive medicine and continue to follow a low sodium diet and take current medication. All questions about this condition were answered today. Anxiety     Patient to continue utilizing medical therapy as well as counseling sources as applicable to condition. If suicidal thought or fear of suicide attempt, to call 911 and seek help immediately. Medications and therapy reviewed today and all patient  questions answered today. GERD (gastroesophageal reflux disease)     Patient to continue with present therapy and decrease caffeine, avoid ETOH and smoking to decrease acid production. Pt should also cease eating prior to bedtime and avoid excessive fluid intake prior to sleep. May use antacids as needed for breakthrough GERD. All pateint questions answered today about this condition. Right hip pain    Relevant Medications    celecoxib (CeleBREX) 200 mg capsule   Other Visit Diagnoses     Well adult exam    -  Primary    Hypothyroidism due to acquired atrophy of thyroid        Relevant Medications    levothyroxine (Synthroid) 25 mcg tablet    Other Relevant Orders    TSH, 3rd generation with Free T4 reflex    Encounter for immunization        Relevant Orders    influenza vaccine, high-dose, PF 0.7 mL (FLUZONE HIGH-DOSE)          Depression Screening and Follow-up Plan: Patient was screened for depression during today's encounter. They screened negative with a PHQ-2 score of 0. Preventive health issues were discussed with patient, and age appropriate screening tests were ordered as noted in patient's After Visit Summary.   Personalized health advice and appropriate referrals for health education or preventive services given if needed, as noted in patient's After Visit Summary. History of Present Illness:     Patient presents for a Medicare Wellness Visit    HPI   Patient Care Team:  Rios Brand MD as PCP - General (Family Medicine)  Rios Brand MD as PCP - [facility] MACI Mathis ([facility])     Review of Systems:     Review of Systems     Problem List:     Patient Active Problem List   Diagnosis   • RLS (restless legs syndrome)   • Essential hypertension   • Anxiety   • GERD (gastroesophageal reflux disease)   • Right hip pain      Past Medical and Surgical History:     Past Medical History:   Diagnosis Date   • GERD (gastroesophageal reflux disease)    • Right hip pain 2021     History reviewed. No pertinent surgical history. Family History:     Family History   Family history unknown: Yes      Social History:     Social History     Socioeconomic History   • Marital status: /Civil Union     Spouse name: None   • Number of children: None   • Years of education: None   • Highest education level: None   Occupational History   • Occupation: Retired   Tobacco Use   • Smoking status: Former     Types: Cigarettes   • Smokeless tobacco: Never   • Tobacco comments:     per [employer] Use   • Vaping Use: Never used   Substance and Sexual Activity   • Alcohol use: Yes     Comment: occasional   • Drug use: Never     Comment: No use   • Sexual activity: None   Other Topics Concern   • None   Social History Narrative    · Most recent tobacco use screenin2019      · Do you currently or have you served in the [address] [place] [address] [employer]:    Yes      · If Yes, What branch of service:   [employer]      · 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, into active duty, 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:   Yes      · Occupation:   Retired      · Marital status:         · Sexual orientation:   Heterosexual      · Exercise level:   None      · Alcohol intake: Occasional      · Caffeine intake:   None      · Chewing tobacco:   none      · Guns present in home: Yes      · Seat belts used routinely:   Yes      · Sunscreen used routinely:   Yes      · Smoke alarm in home: Yes      · Advance directive:   No  11/26/19      Social Determinants of Health     Financial Resource Strain: Medium Risk (10/20/2023)    Overall Financial Resource Strain (CARDIA)    • Difficulty of Paying Living Expenses: Somewhat hard   Food Insecurity: Not on file   Transportation Needs: No Transportation Needs (10/20/2023)    PRAPARE - Transportation    • Lack of Transportation (Medical): No    • Lack of Transportation (Non-Medical): No   Physical Activity: Not on file   Stress: Not on file   Social Connections: Not on file   Intimate Partner Violence: Not on file   Housing Stability: Not on file      Medications and Allergies:     Current Outpatient Medications   Medication Sig Dispense Refill   • ALPRAZolam (XANAX) 0.5 mg tablet TAKE 1 TABLET 3 TIMES A DAYAS NEEDED FOR ANXIETY 90 tablet 0   • celecoxib (CeleBREX) 200 mg capsule Take 1 capsule (200 mg total) by mouth 2 (two) times a day 180 capsule 1   • Cholecalciferol 125 MCG (5000 UT) capsule Take 125 mcg by mouth daily     • lansoprazole (PREVACID) 30 mg capsule Take 1 capsule (30 mg total) by mouth daily 90 capsule 3   • levothyroxine (Synthroid) 25 mcg tablet Take 25 mcg by mouth daily     • metoprolol tartrate (LOPRESSOR) 25 mg tablet Take 1 tablet (25 mg total) by mouth 2 (two) times a day 180 tablet 3   • rOPINIRole (REQUIP) 0.5 mg tablet Take 1 tablet (0.5 mg total) by mouth 3 (three) times a day 270 tablet 3     No current facility-administered medications for this visit.      No Known Allergies   Immunizations:     Immunization History   Administered Date(s) Administered   • COVID-19 MODERNA VACC 0.5 ML IM 02/03/2021, 03/03/2021   • INFLUENZA 11/02/2006, 10/08/2008, 12/08/2010, 02/29/2012, 11/30/2012, 09/25/2013, 10/29/2014, 09/01/2015, 01/03/2018, 12/20/2018, 10/29/2019   • Influenza Quadrivalent Preservative Free 3 years and older IM 10/29/2019   • Influenza Quadrivalent Preservative Free ID 02/11/2022   • Influenza, high dose seasonal 0.7 mL 10/22/2020, 11/25/2022   • Influenza, injectable, quadrivalent, preservative free 0.5 mL 10/29/2019   • Influenza, seasonal, injectable, preservative free 11/15/2016   • Pneumococcal 02/29/2012   • Pneumococcal Conjugate 13-Valent 04/06/2017, 01/03/2018   • Pneumococcal Conjugate Vaccine 20-valent (Pcv20), Polysace 08/25/2022   • Td (adult), Unspecified 01/01/2017   • Tdap 01/28/2013, 04/06/2017      Health Maintenance:         Topic Date Due   • Hepatitis C Screening  Completed         Topic Date Due   • COVID-19 Vaccine (3 - Moderna series) 04/28/2021   • Influenza Vaccine (1) 09/01/2023      Medicare Screening Tests and Risk Assessments:         Health Risk Assessment:   Patient rates overall health as fair. Patient feels that their physical health rating is same. Patient is satisfied with their life. Eyesight was rated as same. Hearing was rated as same. Patient feels that their emotional and mental health rating is same. Patients states they are never, rarely angry. Patient states they are sometimes unusually tired/fatigued. Pain experienced in the last 7 days has been a lot. Patient's pain rating has been 8/10. Patient states that he has experienced no weight loss or gain in last 6 months. Depression Screening:   PHQ-2 Score: 0      Fall Risk Screening: In the past year, patient has experienced: history of falling in past year    Number of falls: 2 or more  Injured during fall?: No    Feels unsteady when standing or walking?: No    Worried about falling?: No      Home Safety:  Patient does not have trouble with stairs inside or outside of their home. Patient has working smoke alarms and has working carbon monoxide detector. Home safety hazards include: none.      Nutrition:   Current diet is Regular. Medications:   Patient is currently taking over-the-counter supplements. OTC medications include: see medication list. Patient is able to manage medications. Activities of Daily Living (ADLs)/Instrumental Activities of Daily Living (IADLs):   Walk and transfer into and out of bed and chair?: Yes  Dress and groom yourself?: Yes    Bathe or shower yourself?: Yes    Feed yourself? Yes  Do your laundry/housekeeping?: Yes  Manage your money, pay your bills and track your expenses?: Yes  Make your own meals?: Yes    Do your own shopping?: Yes    Previous Hospitalizations:   Any hospitalizations or ED visits within the last 12 months?: No      Advance Care Planning:   Living will: No    Durable POA for healthcare: No      PREVENTIVE SCREENINGS      Cardiovascular Screening:    General: Screening Current      Diabetes Screening:     General: Screening Current      Prostate Cancer Screening:    General: Screening Not Indicated      Abdominal Aortic Aneurysm (AAA) Screening:    Risk factors include: tobacco use        Lung Cancer Screening:     General: Screening Not Indicated      Hepatitis C Screening:    General: Screening Current    Screening, Brief Intervention, and Referral to Treatment (SBIRT)    Screening      AUDIT-C Screenin) How often did you have a drink containing alcohol in the past year? 2 to 3 times a week  2) How many drinks did you have on a typical day when you were drinking in the past year? 3 to 4  3) How often did you have 6 or more drinks on one occasion in the past year? monthly    AUDIT-C Score: 5  Interpretation: Score 4-12 (male): POSITIVE screen for alcohol misuse    No results found.      Physical Exam:     /67 (BP Location: Left arm, Patient Position: Sitting, Cuff Size: Large)   Pulse 66   Temp 99.7 °F (37.6 °C) (Skin)   Ht 5' 8" (1.727 m)   Wt 83 kg (183 lb)   SpO2 97%   BMI 27.83 kg/m²     Physical Exam     Lucía Tsang MD

## 2023-10-20 NOTE — PROGRESS NOTES
Name: Hardik Brown      :       MRN: [id number]  Encounter Provider: Mayuri Quijano MD  Encounter Date: 10/20/2023   Encounter department: [facility]     1. Well adult exam    2. Anxiety  Assessment & Plan:  Patient to continue utilizing medical therapy as well as counseling sources as applicable to condition. If suicidal thought or fear of suicide attempt, to call 911 and seek help immediately. Medications and therapy reviewed today and all patient  questions answered today. 3. Essential hypertension  Assessment & Plan:  Patient is stable with current anti-hypertensive medicine and continue to follow a low sodium diet and take current medication. All questions about this condition were answered today. 4. Gastroesophageal reflux disease without esophagitis  Assessment & Plan:  Patient to continue with present therapy and decrease caffeine, avoid ETOH and smoking to decrease acid production. Pt should also cease eating prior to bedtime and avoid excessive fluid intake prior to sleep. May use antacids as needed for breakthrough GERD. All pateint questions answered today about this condition. 5. RLS (restless legs syndrome)  Assessment & Plan:  Patient is stable  and will continue present plan of care and reassess at next routine visit. All questions about this problem from patient were answered today. 6. Right hip pain  -     celecoxib (CeleBREX) 200 mg capsule; Take 1 capsule (200 mg total) by mouth 2 (two) times a day    7. Hypothyroidism due to acquired atrophy of thyroid  -     TSH, 3rd generation with Free T4 reflex; Future    8. Encounter for immunization  -     influenza vaccine, high-dose, PF 0.7 mL (FLUZONE HIGH-DOSE)        Depression Screening and Follow-up Plan: Patient was screened for depression during today's encounter. They screened negative with a PHQ-2 score of 0.     Falls Plan of Care: balance, strength, and gait training instructions were provided. Home safety education provided. Subjective     23-year-old male here today for checkup for his Medicare wellness his checkup was checkup on multimedical problems. Patient with hypertension some anxiety some GERD hypothyroidism. Patient not in need of any refills at this time and will call when appropriate to refill. Patient would like a flu shot. Patient is also due for his thyroid to be checked in about 3 months and I gave him a slip for that today. Patient would like to increase his Celebrex to twice a day and he will do that but he will make sure he takes it with food. Review of Systems   Constitutional:  Negative for activity change, appetite change, chills, fatigue, fever and unexpected weight change. HENT:  Negative for congestion, ear pain, hearing loss, mouth sores, postnasal drip, sinus pressure, sinus pain, sneezing and sore throat. Respiratory:  Negative for apnea, cough, shortness of breath and wheezing. Cardiovascular:  Negative for chest pain, palpitations and leg swelling. Gastrointestinal:  Negative for abdominal pain, constipation, diarrhea, nausea and vomiting. Endocrine: Negative for cold intolerance and heat intolerance. Genitourinary:  Negative for dysuria, frequency and hematuria. Musculoskeletal:  Negative for arthralgias, back pain, gait problem, joint swelling and neck pain. Skin:  Negative for rash. Neurological:  Negative for dizziness, weakness and numbness. Hematological:  Does not bruise/bleed easily. Psychiatric/Behavioral:  Negative for agitation, behavioral problems, confusion, hallucinations and sleep disturbance. The patient is not nervous/anxious. Past Medical History:   Diagnosis Date   • GERD (gastroesophageal reflux disease)    • Right hip pain 4/22/2021     History reviewed. No pertinent surgical history.   Family History   Family history unknown: Yes     Social History     Socioeconomic History   • Marital status: /Civil Union     Spouse name: None   • Number of children: None   • Years of education: None   • Highest education level: None   Occupational History   • Occupation: Retired   Tobacco Use   • Smoking status: Former     Types: Cigarettes   • Smokeless tobacco: Never   • Tobacco comments:     per [employer] Use   • Vaping Use: Never used   Substance and Sexual Activity   • Alcohol use: Yes     Comment: occasional   • Drug use: Never     Comment: No use   • Sexual activity: None   Other Topics Concern   • None   Social History Narrative    · Most recent tobacco use screenin2019      · Do you currently or have you served in [employer] [address] [place] [address] [employer]: Yes      · If Yes, What branch of service:   [employer]      · 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, into active duty, 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:   Yes      · Occupation:   Retired      · Marital status:         · Sexual orientation:   Heterosexual      · Exercise level:   None      · Alcohol intake:   Occasional      · Caffeine intake:   None      · Chewing tobacco:   none      · Guns present in home: Yes      · Seat belts used routinely:   Yes      · Sunscreen used routinely:   Yes      · Smoke alarm in home: Yes      · Advance directive:   No  19      Social Determinants of Health     Financial Resource Strain: Medium Risk (10/20/2023)    Overall Financial Resource Strain (CARDIA)    • Difficulty of Paying Living Expenses: Somewhat hard   Food Insecurity: Not on file   Transportation Needs: No Transportation Needs (10/20/2023)    PRAPARE - Transportation    • Lack of Transportation (Medical): No    • Lack of Transportation (Non-Medical):  No   Physical Activity: Not on file   Stress: Not on file   Social Connections: Not on file   Intimate Partner Violence: Not on file   Housing Stability: Not on file     Current Outpatient Medications on File Prior to Visit   Medication Sig   • ALPRAZolam (XANAX) 0.5 mg tablet TAKE 1 TABLET 3 TIMES A DAYAS NEEDED FOR ANXIETY   • Cholecalciferol 125 MCG (5000 UT) capsule Take 125 mcg by mouth daily   • lansoprazole (PREVACID) 30 mg capsule Take 1 capsule (30 mg total) by mouth daily   • levothyroxine (Synthroid) 25 mcg tablet Take 25 mcg by mouth daily   • metoprolol tartrate (LOPRESSOR) 25 mg tablet Take 1 tablet (25 mg total) by mouth 2 (two) times a day   • rOPINIRole (REQUIP) 0.5 mg tablet Take 1 tablet (0.5 mg total) by mouth 3 (three) times a day   • [DISCONTINUED] celecoxib (CeleBREX) 200 mg capsule TAKE 1 CAPSULE EVERY DAY     No Known Allergies  Immunization History   Administered Date(s) Administered   • COVID-19 MODERNA VACC 0.5 ML IM 02/03/2021, 03/03/2021   • INFLUENZA 11/02/2006, 10/08/2008, 12/08/2010, 02/29/2012, 11/30/2012, 09/25/2013, 10/29/2014, 09/01/2015, 01/03/2018, 12/20/2018, 10/29/2019   • Influenza Quadrivalent Preservative Free 3 years and older IM 10/29/2019   • Influenza Quadrivalent Preservative Free ID 02/11/2022   • Influenza, high dose seasonal 0.7 mL 10/22/2020, 11/25/2022   • Influenza, injectable, quadrivalent, preservative free 0.5 mL 10/29/2019   • Influenza, seasonal, injectable, preservative free 11/15/2016   • Pneumococcal 02/29/2012   • Pneumococcal Conjugate 13-Valent 04/06/2017, 01/03/2018   • Pneumococcal Conjugate Vaccine 20-valent (Pcv20), Polysace 08/25/2022   • Td (adult), Unspecified 01/01/2017   • Tdap 01/28/2013, 04/06/2017       Objective     /67 (BP Location: Left arm, Patient Position: Sitting, Cuff Size: Large)   Pulse 66   Temp 99.7 °F (37.6 °C) (Skin)   Ht 5' 8" (1.727 m)   Wt 83 kg (183 lb)   SpO2 97%   BMI 27.83 kg/m²     Physical Exam  Caitlin Santoro MD

## 2023-10-20 NOTE — PATIENT INSTRUCTIONS
Medicare Preventive Visit Patient Instructions  Thank you for completing your Welcome to Medicare Visit or Medicare Annual Wellness Visit today. Your next wellness visit will be due in one year (10/20/2024). The screening/preventive services that you may require over the next 5-10 years are detailed below. Some tests may not apply to you based off risk factors and/or age. Screening tests ordered at today's visit but not completed yet may show as past due. Also, please note that scanned in results may not display below. Preventive Screenings:  Service Recommendations Previous Testing/Comments   Colorectal Cancer Screening  Colonoscopy    Fecal Occult Blood Test (FOBT)/Fecal Immunochemical Test (FIT)  Fecal DNA/Cologuard Test  Flexible Sigmoidoscopy Age: 43-73 years old   Colonoscopy: every 10 years (May be performed more frequently if at higher risk)  OR  FOBT/FIT: every 1 year  OR  Cologuard: every 3 years  OR  Sigmoidoscopy: every 5 years  Screening may be recommended earlier than age 39 if at higher risk for colorectal cancer. Also, an individualized decision between you and your healthcare provider will decide whether screening between the ages of 77-80 would be appropriate.  Colonoscopy: Not on file  FOBT/FIT: Not on file  Cologuard: Not on file  Sigmoidoscopy: Not on file          Prostate Cancer Screening Individualized decision between patient and health care provider in men between ages of 53-66   Medicare will cover every 12 months beginning on the day after your 50th birthday PSA: 0.3 ng/mL     Screening Not Indicated     Hepatitis C Screening Once for adults born between 1945 and 1965  More frequently in patients at high risk for Hepatitis C Hep C Antibody: 03/26/2021    Screening Current   Diabetes Screening 1-2 times per year if you're at risk for diabetes or have pre-diabetes Fasting glucose: No results in last 5 years (No results in last 5 years)  A1C: No results in last 5 years (No results in last 5 years)  Screening Current   Cholesterol Screening Once every 5 years if you don't have a lipid disorder. May order more often based on risk factors. Lipid panel: 06/08/2023  Screening Current      Other Preventive Screenings Covered by Medicare:  Abdominal Aortic Aneurysm (AAA) Screening: covered once if your at risk. You're considered to be at risk if you have a family history of AAA or a male between the age of 70-76 who smoking at least 100 cigarettes in your lifetime. Lung Cancer Screening: covers low dose CT scan once per year if you meet all of the following conditions: (1) Age 48-67; (2) No signs or symptoms of lung cancer; (3) Current smoker or have quit smoking within the last 15 years; (4) You have a tobacco smoking history of at least 20 pack years (packs per day x number of years you smoked); (5) You get a written order from a healthcare provider. Glaucoma Screening: covered annually if you're considered high risk: (1) You have diabetes OR (2) Family history of glaucoma OR (3)  aged 48 and older OR (3)  American aged 72 and older  Osteoporosis Screening: covered every 2 years if you meet one of the following conditions: (1) Have a vertebral abnormality; (2) On glucocorticoid therapy for more than 3 months; (3) Have primary hyperparathyroidism; (4) On osteoporosis medications and need to assess response to drug therapy. HIV Screening: covered annually if you're between the age of 14-79. Also covered annually if you are younger than 13 and older than 72 with risk factors for HIV infection. For pregnant patients, it is covered up to 3 times per pregnancy.     Immunizations:  Immunization Recommendations   Influenza Vaccine Annual influenza vaccination during flu season is recommended for all persons aged >= 6 months who do not have contraindications   Pneumococcal Vaccine   * Pneumococcal conjugate vaccine = PCV13 (Prevnar 13), PCV15 (Vaxneuvance), PCV20 (Prevnar 20)  * Pneumococcal polysaccharide vaccine = PPSV23 (Pneumovax) Adults 35-72 yo with certain risk factors or if 69+ yo  If never received any pneumonia vaccine: recommend Prevnar 20 (PCV20)  Give PCV20 if previously received 1 dose of PCV13 or PPSV23   Hepatitis B Vaccine 3 dose series if at intermediate or high risk (ex: diabetes, end stage renal disease, liver disease)   Respiratory syncytial virus (RSV) Vaccine - COVERED BY MEDICARE PART D  * RSVPreF3 (Arexvy) CDC recommends that adults 61years of age and older may receive a single dose of RSV vaccine using shared clinical decision-making (SCDM)   Tetanus (Td) Vaccine - COST NOT COVERED BY MEDICARE PART B Following completion of primary series, a booster dose should be given every 10 years to maintain immunity against tetanus. Td may also be given as tetanus wound prophylaxis. Tdap Vaccine - COST NOT COVERED BY MEDICARE PART B Recommended at least once for all adults. For pregnant patients, recommended with each pregnancy. Shingles Vaccine (Shingrix) - COST NOT COVERED BY MEDICARE PART B  2 shot series recommended in those 19 years and older who have or will have weakened immune systems or those 50 years and older     Health Maintenance Due:      Topic Date Due   • Hepatitis C Screening  Completed     Immunizations Due:      Topic Date Due   • COVID-19 Vaccine (3 - Moderna series) 04/28/2021   • Influenza Vaccine (1) 09/01/2023     Advance Directives   What are advance directives? Advance directives are legal documents that state your wishes and plans for medical care. These plans are made ahead of time in case you lose your ability to make decisions for yourself. Advance directives can apply to any medical decision, such as the treatments you want, and if you want to donate organs. What are the types of advance directives? There are many types of advance directives, and each state has rules about how to use them.  You may choose a combination of any of the following:  Living will: This is a written record of the treatment you want. You can also choose which treatments you do not want, which to limit, and which to stop at a certain time. This includes surgery, medicine, IV fluid, and tube feedings. Durable power of  for healthcare [facility]): This is a written record that states who you want to make healthcare choices for you when you are unable to make them for yourself. This person, called a proxy, is usually a family member or a friend. You may choose more than 1 proxy. Do not resuscitate (DNR) order:  A DNR order is used in case your heart stops beating or you stop breathing. It is a request not to have certain forms of treatment, such as CPR. A DNR order may be included in other types of advance directives. Medical directive: This covers the care that you want if you are in a coma, near death, or unable to make decisions for yourself. You can list the treatments you want for each condition. Treatment may include pain medicine, surgery, blood transfusions, dialysis, IV or tube feedings, and a ventilator (breathing machine). Values history: This document has questions about your views, beliefs, and how you feel and think about life. This information can help others choose the care that you would choose. Why are advance directives important? An advance directive helps you control your care. Although spoken wishes may be used, it is better to have your wishes written down. Spoken wishes can be misunderstood, or not followed. Treatments may be given even if you do not want them. An advance directive may make it easier for your family to make difficult choices about your care. Fall Prevention    Fall prevention  includes ways to make your home and other areas safer. It also includes ways you can move more carefully to prevent a fall.  Health conditions that cause changes in your blood pressure, vision, or muscle strength and coordination may increase your risk for falls. Medicines may also increase your risk for falls if they make you dizzy, weak, or sleepy. Fall prevention tips:   Stand or sit up slowly. Use assistive devices as directed. Wear shoes that fit well and have soles that . Wear a personal alarm. Stay active. Manage your medical conditions. Home Safety Tips:  Add items to prevent falls in the bathroom. Keep paths clear. Install bright lights in your home. Keep items you use often on shelves within reach. Paint or place reflective tape on the edges of your stairs. Weight Management   Why it is important to manage your weight:  Being overweight increases your risk of health conditions such as heart disease, high blood pressure, type 2 diabetes, and certain types of cancer. It can also increase your risk for osteoarthritis, sleep apnea, and other respiratory problems. Aim for a slow, steady weight loss. Even a small amount of weight loss can lower your risk of health problems. How to lose weight safely:  A safe and healthy way to lose weight is to eat fewer calories and get regular exercise. You can lose up about 1 pound a week by decreasing the number of calories you eat by 500 calories each day. Healthy meal plan for weight management:  A healthy meal plan includes a variety of foods, contains fewer calories, and helps you stay healthy. A healthy meal plan includes the following:  Eat whole-grain foods more often. A healthy meal plan should contain fiber. Fiber is the part of grains, fruits, and vegetables that is not broken down by your body. Whole-grain foods are healthy and provide extra fiber in your diet. Some examples of whole-grain foods are whole-wheat breads and pastas, oatmeal, brown rice, and bulgur. Eat a variety of vegetables every day. Include dark, leafy greens such as spinach, kale, odessa greens, and mustard greens.  Eat yellow and orange vegetables such as carrots, sweet potatoes, and winter squash. Eat a variety of fruits every day. Choose fresh or canned fruit (canned in its own juice or light syrup) instead of juice. Fruit juice has very little or no fiber. Eat low-fat dairy foods. Drink fat-free (skim) milk or 1% milk. Eat fat-free yogurt and low-fat cottage cheese. Try low-fat cheeses such as mozzarella and other reduced-fat cheeses. Choose meat and other protein foods that are low in fat. Choose beans or other legumes such as split peas or lentils. Choose fish, skinless poultry (chicken or turkey), or lean cuts of red meat (beef or pork). Before you cook meat or poultry, cut off any visible fat. Use less fat and oil. Try baking foods instead of frying them. Add less fat, such as margarine, sour cream, regular salad dressing and mayonnaise to foods. Eat fewer high-fat foods. Some examples of high-fat foods include french fries, doughnuts, ice cream, and cakes. Eat fewer sweets. Limit foods and drinks that are high in sugar. This includes candy, cookies, regular soda, and sweetened drinks. Exercise:  Exercise at least 30 minutes per day on most days of the week. Some examples of exercise include walking, biking, dancing, and swimming. You can also fit in more physical activity by taking the stairs instead of the elevator or parking farther away from stores. Ask your healthcare provider about the best exercise plan for you. Alcohol Use and Your Health    Drinking too much can harm your health. Excessive alcohol use leads to about 88,000 death in the [facility] each year, and shortens the life of those who diet by almost 30 years. Further, excessive drinking cost the economy $249 billion in 2010. Most excessive drinkers are not alcohol dependent. Excessive alcohol use has immediate effects that increase the risk of many harmful health conditions. These are most often the result of binge drinking.   Over time, excessive alcohol use can lead to the development of chronic diseases and other series health problems. What is considered a "drink"? Excessive alcohol use includes:  Binge Drinking: For women, 4 or more drinks consumed on one occasion. For men, 5 or more drinks consumed on one occasion. Heavy Drinking: For women, 8 or more drinks per week. For men, 15 or more drinks per week  Any alcohol used by pregnant women  Any alcohol used by those under the age of 21 years    If you choose to drink, do so in moderation:  Do not drink at all if you are under the age of 24, or if you are or may be pregnant, or have health problems that could be made worse by drinking. For women, up to 1 drink per day  For men, up to 2 drinks a day    No one should begin drinking or drink more frequently based on potential health benefits    Short-Term Health Risks:  Injuries: motor vehicle crashes, falls, drownings, burns  Violence: homicide, suicide, sexual assault, intimate partner violence  Alcohol poisoning  Reproductive health: risky sexual behaviors, unintended prengnacy, sexually transmitted diseases, miscarriage, stillbirth, fetal alcohol syndrome    Long-Term Health Risks:  Chronic diseases: high blood pressure, heart disease, stroke, liver disease, digestive problems  Cancers: breast, mouth and throat, liver, colon  Learning and memory problems: dementia, poor school performance  Mental health: depression, anxiety, insomnia  Social problems: lost productivity, family problems, unemployment  Alcohol dependence    For support and more information:  Substance Abuse and [address]  Web Address: https://Dyn/    Alcoholics Anonymous        Web Address: http://www.rivas.info/    https://www.cdc.gov/alcohol/fact-sheets/alcohol-use.htm     © Copyright [employer] 2018 Information is for End User's use only and may not be sold, redistributed or otherwise used for commercial purposes.  All illustrations and images included in [facility] [facility] are the copyrighted property of [employer] or [address] [place] [address]

## 2023-11-06 ENCOUNTER — TELEPHONE (OUTPATIENT)
Dept: FAMILY MEDICINE CLINIC | Facility: CLINIC | Age: 78
End: 2023-11-06

## 2023-11-06 DIAGNOSIS — M25.551 RIGHT HIP PAIN: ICD-10-CM

## 2023-11-06 RX ORDER — CELECOXIB 200 MG/1
200 CAPSULE ORAL 2 TIMES DAILY
Qty: 180 CAPSULE | Refills: 1 | Status: SHIPPED | OUTPATIENT
Start: 2023-11-06

## 2023-11-06 NOTE — TELEPHONE ENCOUNTER
Patient asking for his prescription for celebrex to be reordered to [facility] pharmacy as he no longer uses [facility].

## 2023-12-07 ENCOUNTER — TELEPHONE (OUTPATIENT)
Age: 78
End: 2023-12-07

## 2023-12-07 NOTE — TELEPHONE ENCOUNTER
Patient lost lab scripts. Patient requested a new copy . Please have it ready as it will be picked up. Please contact patient when this is complete. Thank you for your help.

## 2023-12-28 DIAGNOSIS — K21.9 GASTROESOPHAGEAL REFLUX DISEASE WITHOUT ESOPHAGITIS: ICD-10-CM

## 2023-12-28 DIAGNOSIS — G25.81 RLS (RESTLESS LEGS SYNDROME): ICD-10-CM

## 2023-12-28 DIAGNOSIS — I10 ESSENTIAL HYPERTENSION: ICD-10-CM

## 2023-12-29 RX ORDER — ROPINIROLE 0.5 MG/1
0.5 TABLET, FILM COATED ORAL 3 TIMES DAILY
Qty: 270 TABLET | Refills: 1 | Status: SHIPPED | OUTPATIENT
Start: 2023-12-29

## 2023-12-29 RX ORDER — LANSOPRAZOLE 30 MG/1
30 CAPSULE, DELAYED RELEASE ORAL DAILY
Qty: 90 CAPSULE | Refills: 1 | Status: SHIPPED | OUTPATIENT
Start: 2023-12-29

## 2024-01-07 LAB — TSH SERPL-ACNC: 4.35 MIU/L (ref 0.4–4.5)

## 2024-01-26 ENCOUNTER — TELEPHONE (OUTPATIENT)
Dept: FAMILY MEDICINE CLINIC | Facility: CLINIC | Age: 79
End: 2024-01-26

## 2024-04-25 DIAGNOSIS — M25.551 RIGHT HIP PAIN: ICD-10-CM

## 2024-04-25 NOTE — PROGRESS NOTES
Name: Devin Cutler      : 1945      MRN: [id number]  Encounter Provider: Momo Trinidad MD  Encounter Date: 2024   Encounter department: [facility]    Assessment & Plan     1. Anxiety  Assessment & Plan:  Patient to continue utilizing medical therapy as well as counseling sources as applicable to condition. If suicidal thought or fear of suicide attempt, to call 911 and seek help immediately. Medications and therapy reviewed today and all patient  questions answered today.     Orders:  -     ALPRAZolam (XANAX) 0.5 mg tablet; Take 1 tablet (0.5 mg total) by mouth 3 (three) times a day as needed for anxiety    2. Essential hypertension  Assessment & Plan:  Patient is stable with current anti-hypertensive medicine and continue to follow a low sodium diet and take current medication. All questions about this condition were answered today.     Orders:  -     Comprehensive metabolic panel; Future  -     CBC and differential; Future  -     TSH, 3rd generation with Free T4 reflex; Future  -     Magnesium; Future  -     Uric acid; Future  -     UA/M w/rflx Culture, Comp  -     Comprehensive metabolic panel  -     CBC and differential  -     TSH, 3rd generation with Free T4 reflex  -     Magnesium  -     Uric acid    3. Gastroesophageal reflux disease without esophagitis  Assessment & Plan:  Patient to continue with present therapy and decrease caffeine, avoid ETOH and smoking to decrease acid production. Pt should also cease eating prior to bedtime and avoid excessive fluid intake prior to sleep. May use antacids as needed for breakthrough GERD. All pateint questions answered today about this condition.       4. RLS (restless legs syndrome)  Assessment & Plan:  Patient is stable  and will continue present plan of care and reassess at next routine visit. All questions about this problem from patient were answered today.       5. Hypothyroidism due to acquired atrophy of  thyroid    6. Screening cholesterol level  -     Lipid Panel with Direct LDL reflex; Future  -     Lipid Panel with Direct LDL reflex        Falls Plan of Care: balance, strength, and gait training instructions were provided. Home safety education provided.       Subjective     71-year-old male here today for checkup on multimedical problems.  Patient hypothyroidism hypertension anxiety GERD cyst leg syndrome and chronic pain.  Patient doing well but his medications [facility].  On his Xanax which was done for him today.      Review of Systems   Constitutional:  Negative for activity change, appetite change, chills, fatigue, fever and unexpected weight change.   HENT:  Negative for congestion, ear pain, hearing loss, mouth sores, postnasal drip, sinus pressure, sinus pain, sneezing and sore throat.    Respiratory:  Negative for apnea, cough, shortness of breath and wheezing.    Cardiovascular:  Negative for chest pain, palpitations and leg swelling.   Gastrointestinal:  Negative for abdominal pain, constipation, diarrhea, nausea and vomiting.   Endocrine: Negative for cold intolerance and heat intolerance.   Genitourinary:  Negative for dysuria, frequency and hematuria.   Musculoskeletal:  Negative for arthralgias, back pain, gait problem, joint swelling and neck pain.   Skin:  Negative for rash.   Neurological:  Negative for dizziness, weakness and numbness.   Hematological:  Does not bruise/bleed easily.   Psychiatric/Behavioral:  Negative for agitation, behavioral problems, confusion, hallucinations and sleep disturbance. The patient is not nervous/anxious.    GERD    Past Medical History:   Diagnosis Date   • GERD (gastroesophageal reflux disease)    • Right hip pain 4/22/2021     History reviewed. No pertinent surgical history.  Family History   Family history unknown: Yes     Social History     Socioeconomic History   • Marital status:  /Civil Union     Spouse name: None   • Number of children: None   • Years of education: None   • Highest education level: None   Occupational History   • Occupation: Retired   Tobacco Use   • Smoking status: Former     Types: Cigarettes     Passive exposure: Past   • Smokeless tobacco: Never   • Tobacco comments:     per [place]   Vaping Use   • Vaping status: Never Used   Substance and Sexual Activity   • Alcohol use: Yes     Comment: occasional   • Drug use: Never     Comment: No use   • Sexual activity: None   Other Topics Concern   • None   Social History Narrative    · Most recent tobacco use screenin2019      · Do you currently or have you served in the [employer]:   Yes      · If Yes, What branch of service:   [employer]      · 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, into active duty, as a member of the [employer] or as a Reservist:   Yes      · Occupation:   Retired      · Marital status:         · Sexual orientation:   Heterosexual      · Exercise level:   None      · Alcohol intake:   Occasional      · Caffeine intake:   None      · Chewing tobacco:   none      · Guns present in home:   Yes      · Seat belts used routinely:   Yes      · Sunscreen used routinely:   Yes      · Smoke alarm in home:   Yes      · Advance directive:   No  19      Social Determinants of Health     Financial Resource Strain: Medium Risk (10/20/2023)    Overall Financial Resource Strain (CARDIA)    • Difficulty of Paying Living Expenses: Somewhat hard   Food Insecurity: Not on file   Transportation Needs: No Transportation Needs (10/20/2023)    PRAPARE - Transportation    • Lack of Transportation (Medical): No    • Lack of Transportation (Non-Medical): No   Physical Activity: Not on file   Stress: Not on file   Social Connections: Not on file   Intimate Partner Violence: Not on file   Housing Stability: Not on file     Current Outpatient Medications on File Prior to Visit   Medication Sig   • celecoxib (CeleBREX) 200  "mg capsule TAKE 1 CAPSULE TWICE DAILY   • Cholecalciferol 125 MCG (5000 UT) capsule Take 125 mcg by mouth daily   • lansoprazole (PREVACID) 30 mg capsule TAKE 1 CAPSULE EVERY DAY   • levothyroxine (Synthroid) 25 mcg tablet Take 25 mcg by mouth daily   • metoprolol tartrate (LOPRESSOR) 25 mg tablet TAKE 1 TABLET TWICE DAILY   • rOPINIRole (REQUIP) 0.5 mg tablet TAKE 1 TABLET THREE TIMES DAILY   • [DISCONTINUED] ALPRAZolam (XANAX) 0.5 mg tablet TAKE 1 TABLET 3 TIMES A DAYAS NEEDED FOR ANXIETY   • [DISCONTINUED] celecoxib (CeleBREX) 200 mg capsule Take 1 capsule (200 mg total) by mouth 2 (two) times a day     No Known Allergies  Immunization History   Administered Date(s) Administered   • COVID-19 MODERNA VACC 0.5 ML IM 02/03/2021, 03/03/2021   • COVID-19 Moderna Vac BIVALENT 12 Yr+ IM 0.5 ML 12/31/2022   • COVID-19 Moderna mRNA Vaccine 12 Yr+ 50 mcg/0.5 mL (Spikevax) 11/24/2023   • INFLUENZA 11/02/2006, 10/08/2008, 12/08/2010, 02/29/2012, 11/30/2012, 09/25/2013, 10/29/2014, 09/01/2015, 01/03/2018, 12/20/2018, 10/29/2019   • Influenza Quadrivalent Preservative Free 3 years and older IM 10/29/2019   • Influenza Quadrivalent Preservative Free ID 02/11/2022   • Influenza, high dose seasonal 0.7 mL 10/22/2020, 11/25/2022, 10/20/2023   • Influenza, injectable, quadrivalent, preservative free 0.5 mL 10/29/2019   • Influenza, seasonal, injectable, preservative free 11/15/2016   • Pneumococcal 02/29/2012   • Pneumococcal Conjugate 13-Valent 04/06/2017, 01/03/2018   • Pneumococcal Conjugate Vaccine 20-valent (Pcv20), Polysace 08/25/2022   • Td (adult), Unspecified 01/01/2017   • Tdap 01/28/2013, 04/06/2017       Objective     /70 (BP Location: Left arm, Patient Position: Sitting, Cuff Size: Standard)   Pulse 74   Temp 98.1 °F (36.7 °C)   Resp 16   Ht 5' 7\" (1.702 m)   Wt 83.4 kg (183 lb 12.8 oz)   SpO2 97%   BMI 28.79 kg/m²     Physical Exam  Momo Trinidad MD    "

## 2024-04-26 ENCOUNTER — OFFICE VISIT (OUTPATIENT)
Dept: FAMILY MEDICINE CLINIC | Facility: CLINIC | Age: 79
End: 2024-04-26
Payer: COMMERCIAL

## 2024-04-26 VITALS
HEIGHT: 67 IN | BODY MASS INDEX: 28.85 KG/M2 | SYSTOLIC BLOOD PRESSURE: 124 MMHG | WEIGHT: 183.8 LBS | DIASTOLIC BLOOD PRESSURE: 70 MMHG | OXYGEN SATURATION: 97 % | RESPIRATION RATE: 16 BRPM | TEMPERATURE: 98.1 F | HEART RATE: 74 BPM

## 2024-04-26 DIAGNOSIS — E03.4 HYPOTHYROIDISM DUE TO ACQUIRED ATROPHY OF THYROID: ICD-10-CM

## 2024-04-26 DIAGNOSIS — F41.9 ANXIETY: Primary | ICD-10-CM

## 2024-04-26 DIAGNOSIS — G25.81 RLS (RESTLESS LEGS SYNDROME): ICD-10-CM

## 2024-04-26 DIAGNOSIS — I10 ESSENTIAL HYPERTENSION: ICD-10-CM

## 2024-04-26 DIAGNOSIS — K21.9 GASTROESOPHAGEAL REFLUX DISEASE WITHOUT ESOPHAGITIS: ICD-10-CM

## 2024-04-26 DIAGNOSIS — Z13.220 SCREENING CHOLESTEROL LEVEL: ICD-10-CM

## 2024-04-26 PROCEDURE — 99214 OFFICE O/P EST MOD 30 MIN: CPT | Performed by: FAMILY MEDICINE

## 2024-04-26 PROCEDURE — G2211 COMPLEX E/M VISIT ADD ON: HCPCS | Performed by: FAMILY MEDICINE

## 2024-04-26 RX ORDER — ALPRAZOLAM 0.5 MG/1
0.5 TABLET ORAL 3 TIMES DAILY PRN
Qty: 270 TABLET | Refills: 1 | Status: SHIPPED | OUTPATIENT
Start: 2024-04-26

## 2024-04-26 RX ORDER — CELECOXIB 200 MG/1
200 CAPSULE ORAL 2 TIMES DAILY
Qty: 180 CAPSULE | Refills: 1 | Status: SHIPPED | OUTPATIENT
Start: 2024-04-26

## 2024-06-06 DIAGNOSIS — G25.81 RLS (RESTLESS LEGS SYNDROME): ICD-10-CM

## 2024-06-06 DIAGNOSIS — K21.9 GASTROESOPHAGEAL REFLUX DISEASE WITHOUT ESOPHAGITIS: ICD-10-CM

## 2024-06-06 DIAGNOSIS — I10 ESSENTIAL HYPERTENSION: ICD-10-CM

## 2024-06-07 RX ORDER — ROPINIROLE 0.5 MG/1
0.5 TABLET, FILM COATED ORAL 3 TIMES DAILY
Qty: 270 TABLET | Refills: 1 | Status: SHIPPED | OUTPATIENT
Start: 2024-06-07

## 2024-06-07 RX ORDER — LANSOPRAZOLE 30 MG/1
30 CAPSULE, DELAYED RELEASE ORAL DAILY
Qty: 90 CAPSULE | Refills: 1 | Status: SHIPPED | OUTPATIENT
Start: 2024-06-07

## 2024-09-23 DIAGNOSIS — M25.551 RIGHT HIP PAIN: ICD-10-CM

## 2024-09-24 RX ORDER — CELECOXIB 200 MG/1
200 CAPSULE ORAL 2 TIMES DAILY
Qty: 180 CAPSULE | Refills: 1 | Status: SHIPPED | OUTPATIENT
Start: 2024-09-24

## 2024-10-18 ENCOUNTER — RA CDI HCC (OUTPATIENT)
Dept: OTHER | Facility: HOSPITAL | Age: 79
End: 2024-10-18

## 2024-10-19 DIAGNOSIS — G25.81 RLS (RESTLESS LEGS SYNDROME): ICD-10-CM

## 2024-10-19 DIAGNOSIS — K21.9 GASTROESOPHAGEAL REFLUX DISEASE WITHOUT ESOPHAGITIS: ICD-10-CM

## 2024-10-19 DIAGNOSIS — I10 ESSENTIAL HYPERTENSION: ICD-10-CM

## 2024-10-19 RX ORDER — ROPINIROLE 0.5 MG/1
0.5 TABLET, FILM COATED ORAL 3 TIMES DAILY
Qty: 270 TABLET | Refills: 1 | Status: SHIPPED | OUTPATIENT
Start: 2024-10-19

## 2024-10-19 RX ORDER — LANSOPRAZOLE 30 MG/1
30 CAPSULE, DELAYED RELEASE ORAL DAILY
Qty: 90 CAPSULE | Refills: 1 | Status: SHIPPED | OUTPATIENT
Start: 2024-10-19

## 2024-10-19 RX ORDER — METOPROLOL TARTRATE 25 MG/1
25 TABLET, FILM COATED ORAL 2 TIMES DAILY
Qty: 180 TABLET | Refills: 1 | Status: SHIPPED | OUTPATIENT
Start: 2024-10-19

## 2024-10-25 ENCOUNTER — OFFICE VISIT (OUTPATIENT)
Dept: FAMILY MEDICINE CLINIC | Facility: CLINIC | Age: 79
End: 2024-10-25
Payer: COMMERCIAL

## 2024-10-25 VITALS
DIASTOLIC BLOOD PRESSURE: 64 MMHG | SYSTOLIC BLOOD PRESSURE: 100 MMHG | WEIGHT: 181 LBS | TEMPERATURE: 98.1 F | HEART RATE: 64 BPM | BODY MASS INDEX: 28.41 KG/M2 | RESPIRATION RATE: 18 BRPM | OXYGEN SATURATION: 95 % | HEIGHT: 67 IN

## 2024-10-25 DIAGNOSIS — Z23 ENCOUNTER FOR IMMUNIZATION: ICD-10-CM

## 2024-10-25 DIAGNOSIS — Z00.00 WELL ADULT EXAM: Primary | ICD-10-CM

## 2024-10-25 DIAGNOSIS — I10 ESSENTIAL HYPERTENSION: ICD-10-CM

## 2024-10-25 DIAGNOSIS — E03.4 HYPOTHYROIDISM DUE TO ACQUIRED ATROPHY OF THYROID: ICD-10-CM

## 2024-10-25 DIAGNOSIS — Z13.220 SCREENING CHOLESTEROL LEVEL: ICD-10-CM

## 2024-10-25 DIAGNOSIS — K21.9 GASTROESOPHAGEAL REFLUX DISEASE WITHOUT ESOPHAGITIS: ICD-10-CM

## 2024-10-25 DIAGNOSIS — F41.9 ANXIETY: ICD-10-CM

## 2024-10-25 DIAGNOSIS — G25.81 RLS (RESTLESS LEGS SYNDROME): ICD-10-CM

## 2024-10-25 PROCEDURE — G0439 PPPS, SUBSEQ VISIT: HCPCS | Performed by: FAMILY MEDICINE

## 2024-10-25 PROCEDURE — G0008 ADMIN INFLUENZA VIRUS VAC: HCPCS | Performed by: FAMILY MEDICINE

## 2024-10-25 PROCEDURE — 90662 IIV NO PRSV INCREASED AG IM: CPT | Performed by: FAMILY MEDICINE

## 2024-10-25 PROCEDURE — 99214 OFFICE O/P EST MOD 30 MIN: CPT | Performed by: FAMILY MEDICINE

## 2024-10-25 RX ORDER — LIDOCAINE 50 MG/G
PATCH TOPICAL
COMMUNITY
Start: 2024-05-23

## 2024-10-25 NOTE — PATIENT INSTRUCTIONS
Medicare Preventive Visit Patient Instructions  Thank you for completing your Welcome to Medicare Visit or Medicare Annual Wellness Visit today. Your next wellness visit will be due in one year (10/26/2025).  The screening/preventive services that you may require over the next 5-10 years are detailed below. Some tests may not apply to you based off risk factors and/or age. Screening tests ordered at today's visit but not completed yet may show as past due. Also, please note that scanned in results may not display below.  Preventive Screenings:  Service Recommendations Previous Testing/Comments   Colorectal Cancer Screening  Colonoscopy    Fecal Occult Blood Test (FOBT)/Fecal Immunochemical Test (FIT)  Fecal DNA/Cologuard Test  Flexible Sigmoidoscopy Age: 45-75 years old   Colonoscopy: every 10 years (May be performed more frequently if at higher risk)  OR  FOBT/FIT: every 1 year  OR  Cologuard: every 3 years  OR  Sigmoidoscopy: every 5 years  Screening may be recommended earlier than age 45 if at higher risk for colorectal cancer. Also, an individualized decision between you and your healthcare provider will decide whether screening between the ages of 76-85 would be appropriate. Colonoscopy: Not on file  FOBT/FIT: Not on file  Cologuard: Not on file  Sigmoidoscopy: Not on file          Prostate Cancer Screening Individualized decision between patient and health care provider in men between ages of 55-69   Medicare will cover every 12 months beginning on the day after your 50th birthday PSA: 0.3 ng/mL     Screening Not Indicated     Hepatitis C Screening Once for adults born between 1945 and 1965  More frequently in patients at high risk for Hepatitis C Hep C Antibody: 03/26/2021    Screening Current   Diabetes Screening 1-2 times per year if you're at risk for diabetes or have pre-diabetes Fasting glucose: No results in last 5 years (No results in last 5 years)  A1C: No results in last 5 years (No results in last 5  years)      Cholesterol Screening Once every 5 years if you don't have a lipid disorder. May order more often based on risk factors. Lipid panel: 06/08/2023  Screening Current      Other Preventive Screenings Covered by Medicare:  Abdominal Aortic Aneurysm (AAA) Screening: covered once if your at risk. You're considered to be at risk if you have a family history of AAA or a male between the age of 65-75 who smoking at least 100 cigarettes in your lifetime.  Lung Cancer Screening: covers low dose CT scan once per year if you meet all of the following conditions: (1) Age 55-77; (2) No signs or symptoms of lung cancer; (3) Current smoker or have quit smoking within the last 15 years; (4) You have a tobacco smoking history of at least 20 pack years (packs per day x number of years you smoked); (5) You get a written order from a healthcare provider.  Glaucoma Screening: covered annually if you're considered high risk: (1) You have diabetes OR (2) Family history of glaucoma OR (3)  aged 50 and older OR (4)  American aged 65 and older  Osteoporosis Screening: covered every 2 years if you meet one of the following conditions: (1) Have a vertebral abnormality; (2) On glucocorticoid therapy for more than 3 months; (3) Have primary hyperparathyroidism; (4) On osteoporosis medications and need to assess response to drug therapy.  HIV Screening: covered annually if you're between the age of 15-65. Also covered annually if you are younger than 15 and older than 65 with risk factors for HIV infection. For pregnant patients, it is covered up to 3 times per pregnancy.    Immunizations:  Immunization Recommendations   Influenza Vaccine Annual influenza vaccination during flu season is recommended for all persons aged >= 6 months who do not have contraindications   Pneumococcal Vaccine   * Pneumococcal conjugate vaccine = PCV13 (Prevnar 13), PCV15 (Vaxneuvance), PCV20 (Prevnar 20)  * Pneumococcal polysaccharide  vaccine = PPSV23 (Pneumovax) Adults 19-63 yo with certain risk factors or if 65+ yo  If never received any pneumonia vaccine: recommend Prevnar 20 (PCV20)  Give PCV20 if previously received 1 dose of PCV13 or PPSV23   Hepatitis B Vaccine 3 dose series if at intermediate or high risk (ex: diabetes, end stage renal disease, liver disease)   Respiratory syncytial virus (RSV) Vaccine - COVERED BY MEDICARE PART D  * RSVPreF3 (Arexvy) CDC recommends that adults 60 years of age and older may receive a single dose of RSV vaccine using shared clinical decision-making (SCDM)   Tetanus (Td) Vaccine - COST NOT COVERED BY MEDICARE PART B Following completion of primary series, a booster dose should be given every 10 years to maintain immunity against tetanus. Td may also be given as tetanus wound prophylaxis.   Tdap Vaccine - COST NOT COVERED BY MEDICARE PART B Recommended at least once for all adults. For pregnant patients, recommended with each pregnancy.   Shingles Vaccine (Shingrix) - COST NOT COVERED BY MEDICARE PART B  2 shot series recommended in those 19 years and older who have or will have weakened immune systems or those 50 years and older     Health Maintenance Due:      Topic Date Due   • Hepatitis C Screening  Completed     Immunizations Due:      Topic Date Due   • Influenza Vaccine (1) 09/01/2024     Advance Directives   What are advance directives?  Advance directives are legal documents that state your wishes and plans for medical care. These plans are made ahead of time in case you lose your ability to make decisions for yourself. Advance directives can apply to any medical decision, such as the treatments you want, and if you want to donate organs.   What are the types of advance directives?  There are many types of advance directives, and each state has rules about how to use them. You may choose a combination of any of the following:  Living will:  This is a written record of the treatment you want. You can  also choose which treatments you do not want, which to limit, and which to stop at a certain time. This includes surgery, medicine, IV fluid, and tube feedings.   Durable power of  for healthcare (DPAHC):  This is a written record that states who you want to make healthcare choices for you when you are unable to make them for yourself. This person, called a proxy, is usually a family member or a friend. You may choose more than 1 proxy.  Do not resuscitate (DNR) order:  A DNR order is used in case your heart stops beating or you stop breathing. It is a request not to have certain forms of treatment, such as CPR. A DNR order may be included in other types of advance directives.  Medical directive:  This covers the care that you want if you are in a coma, near death, or unable to make decisions for yourself. You can list the treatments you want for each condition. Treatment may include pain medicine, surgery, blood transfusions, dialysis, IV or tube feedings, and a ventilator (breathing machine).  Values history:  This document has questions about your views, beliefs, and how you feel and think about life. This information can help others choose the care that you would choose.  Why are advance directives important?  An advance directive helps you control your care. Although spoken wishes may be used, it is better to have your wishes written down. Spoken wishes can be misunderstood, or not followed. Treatments may be given even if you do not want them. An advance directive may make it easier for your family to make difficult choices about your care.   Fall Prevention    Fall prevention  includes ways to make your home and other areas safer. It also includes ways you can move more carefully to prevent a fall. Health conditions that cause changes in your blood pressure, vision, or muscle strength and coordination may increase your risk for falls. Medicines may also increase your risk for falls if they make you  dizzy, weak, or sleepy.   Fall prevention tips:   Stand or sit up slowly.    Use assistive devices as directed.    Wear shoes that fit well and have soles that .    Wear a personal alarm.    Stay active.    Manage your medical conditions.    Home Safety Tips:  Add items to prevent falls in the bathroom.    Keep paths clear.    Install bright lights in your home.    Keep items you use often on shelves within reach.    Paint or place reflective tape on the edges of your stairs.    Weight Management   Why it is important to manage your weight:  Being overweight increases your risk of health conditions such as heart disease, high blood pressure, type 2 diabetes, and certain types of cancer. It can also increase your risk for osteoarthritis, sleep apnea, and other respiratory problems. Aim for a slow, steady weight loss. Even a small amount of weight loss can lower your risk of health problems.  How to lose weight safely:  A safe and healthy way to lose weight is to eat fewer calories and get regular exercise. You can lose up about 1 pound a week by decreasing the number of calories you eat by 500 calories each day.   Healthy meal plan for weight management:  A healthy meal plan includes a variety of foods, contains fewer calories, and helps you stay healthy. A healthy meal plan includes the following:  Eat whole-grain foods more often.  A healthy meal plan should contain fiber. Fiber is the part of grains, fruits, and vegetables that is not broken down by your body. Whole-grain foods are healthy and provide extra fiber in your diet. Some examples of whole-grain foods are whole-wheat breads and pastas, oatmeal, brown rice, and bulgur.  Eat a variety of vegetables every day.  Include dark, leafy greens such as spinach, kale, odessa greens, and mustard greens. Eat yellow and orange vegetables such as carrots, sweet potatoes, and winter squash.   Eat a variety of fruits every day.  Choose fresh or canned fruit (canned  in its own juice or light syrup) instead of juice. Fruit juice has very little or no fiber.  Eat low-fat dairy foods.  Drink fat-free (skim) milk or 1% milk. Eat fat-free yogurt and low-fat cottage cheese. Try low-fat cheeses such as mozzarella and other reduced-fat cheeses.  Choose meat and other protein foods that are low in fat.  Choose beans or other legumes such as split peas or lentils. Choose fish, skinless poultry (chicken or turkey), or lean cuts of red meat (beef or pork). Before you cook meat or poultry, cut off any visible fat.   Use less fat and oil.  Try baking foods instead of frying them. Add less fat, such as margarine, sour cream, regular salad dressing and mayonnaise to foods. Eat fewer high-fat foods. Some examples of high-fat foods include french fries, doughnuts, ice cream, and cakes.  Eat fewer sweets.  Limit foods and drinks that are high in sugar. This includes candy, cookies, regular soda, and sweetened drinks.  Exercise:  Exercise at least 30 minutes per day on most days of the week. Some examples of exercise include walking, biking, dancing, and swimming. You can also fit in more physical activity by taking the stairs instead of the elevator or parking farther away from stores. Ask your healthcare provider about the best exercise plan for you.   Alcohol Use and Your Health    Drinking too much can harm your health.  Excessive alcohol use leads to about 88,000 death in the [place] each year, and shortens the life of those who diet by almost 30 years.  Further, excessive drinking cost the economy $249 billion in 2010.  Most excessive drinkers are not alcohol dependent.    Excessive alcohol use has immediate effects that increase the risk of many harmful health conditions.  These are most often the result of binge drinking.  Over time, excessive alcohol use can lead to the development of chronic diseases and other series health problems.    What is considered a  "\"drink\"?        Excessive alcohol use includes:  Binge Drinking: For women, 4 or more drinks consumed on one occasion. For men, 5 or more drinks consumed on one occasion.  Heavy Drinking: For women, 8 or more drinks per week. For men, 15 or more drinks per week  Any alcohol used by pregnant women  Any alcohol used by those under the age of 21 years    If you choose to drink, do so in moderation:  Do not drink at all if you are under the age of 21, or if you are or may be pregnant, or have health problems that could be made worse by drinking.  For women, up to 1 drink per day  For men, up to 2 drinks a day    No one should begin drinking or drink more frequently based on potential health benefits    Short-Term Health Risks:  Injuries: motor vehicle crashes, falls, drownings, burns  Violence: homicide, suicide, sexual assault, intimate partner violence  Alcohol poisoning  Reproductive health: risky sexual behaviors, unintended prengnacy, sexually transmitted diseases, miscarriage, stillbirth, fetal alcohol syndrome    Long-Term Health Risks:  Chronic diseases: high blood pressure, heart disease, stroke, liver disease, digestive problems  Cancers: breast, mouth and throat, liver, colon  Learning and memory problems: dementia, poor school performance  Mental health: depression, anxiety, insomnia  Social problems: lost productivity, family problems, unemployment  Alcohol dependence    For support and more information:  Substance Abuse and Mental Health Services Administration  [address]  [place] [address]  Web Address: http://www.[facility].gov    Alcoholics Anonymous        Web Address: http://www.aa.org    https://www.cdc.gov/alcohol/fact-sheets/alcohol-use.htm     © Copyright [employer] 2018 Information is for End User's use only and may not be sold, redistributed or otherwise used for commercial purposes. All illustrations and images included in CareNotes® are the copyrighted property of A.D.A.M., Inc. or " [facility]

## 2024-10-25 NOTE — PROGRESS NOTES
Ambulatory Visit  Name: Devin Cutler      : 1945      MRN: [id number]  Encounter Provider: Momo Trinidad MD  Encounter Date: 10/25/2024   Encounter department: [facility]    Assessment & Plan       Preventive health issues were discussed with patient, and age appropriate screening tests were ordered as noted in patient's After Visit Summary. Personalized health advice and appropriate referrals for health education or preventive services given if needed, as noted in patient's After Visit Summary.    History of Present Illness     HPI   Patient Care Team:  Momo Trinidad MD as PCP - General (Family Medicine)  Momo Trinidad MD as PCP - PCP-[facility] ([facility])    Review of Systems  Medical History Reviewed by provider this encounter:       Annual Wellness Visit Questionnaire   Devin is here for his Subsequent Wellness visit.     Health Risk Assessment:   Patient rates overall health as fair. Patient feels that their physical health rating is slightly worse. Patient is very satisfied with their life. Eyesight was rated as slightly worse. Hearing was rated as same. Patient feels that their emotional and mental health rating is same. Patients states they are never, rarely angry. Patient states they are sometimes unusually tired/fatigued. Pain experienced in the last 7 days has been a lot. Patient's pain rating has been 8/10. Patient states that he has experienced no weight loss or gain in last 6 months.     Depression Screening:   PHQ-2 Score: 2      Fall Risk Screening:   In the past year, patient has experienced: history of falling in past year    Number of falls: 2 or more  Injured during fall?: Yes    Feels unsteady when standing or walking?: Yes    Worried about falling?: No      Home Safety:  Patient has trouble with stairs inside or outside of their home. Patient has working smoke alarms and has working carbon monoxide detector. Home safety hazards include: none.      Nutrition:   Current diet is Regular.     Medications:   Patient is currently taking over-the-counter supplements. OTC medications include: see medication list. Patient is able to manage medications.     Activities of Daily Living (ADLs)/Instrumental Activities of Daily Living (IADLs):   Walk and transfer into and out of bed and chair?: Yes  Dress and groom yourself?: Yes    Bathe or shower yourself?: Yes    Feed yourself? Yes  Do your laundry/housekeeping?: Yes  Manage your money, pay your bills and track your expenses?: Yes  Make your own meals?: Yes    Do your own shopping?: Yes    Previous Hospitalizations:   Any hospitalizations or ED visits within the last 12 months?: No      Advance Care Planning:   Living will: No    Durable POA for healthcare: No    Advanced directive: No    Advanced directive counseling given: No    Five wishes given: No    Patient declined ACP directive: No      Cognitive Screening:   Provider or family/friend/caregiver concerned regarding cognition?: No    PREVENTIVE SCREENINGS      Cardiovascular Screening:    General: Screening Current      Prostate Cancer Screening:    General: Screening Not Indicated      Abdominal Aortic Aneurysm (AAA) Screening:    Risk factors include: tobacco use        Lung Cancer Screening:     General: Screening Not Indicated      Hepatitis C Screening:    General: Screening Current    Screening, Brief Intervention, and Referral to Treatment (SBIRT)    Screening  Typical number of drinks in a day: 1  Typical number of drinks in a week: 17  Interpretation: Low risk drinking behavior.    AUDIT-C Screenin) How often did you have a drink containing alcohol in the past year? 2 to 3 times a week  2) How many drinks did you have on a typical day when you were drinking in the past year? 3 to 4  3) How often did you have 6 or more drinks on one occasion in the past year? less than monthly    AUDIT-C Score: 4  Interpretation: Score 4-12 (male): POSITIVE screen  "for alcohol misuse    AUDIT Screenin) How often during the last year have you found that you were not able to stop drinking once you had started? 0 - never  5) How often during the last year have you failed to do what was normally expected from you because of drinking? 0 - never  6) How often during the last year have you needed a first drink in the morning to get yourself going after a heavy drinking session? 0 - never  7) How often during the last year have you had a feeling of guilt or remorse after drinking? 0 - never  8) How often during the last year have you been unable to remember what happened the night before because you had been drinking? 0 - never  9) Have you or someone else been injured as a result of your drinking? 0 - no  10) Has a relative or friend or a doctor or another health worker been concerned about your drinking or suggested you cut down? 0 - no    AUDIT Score: 4  Interpretation: Low risk alcohol consumption    Social Determinants of Health     Financial Resource Strain: Medium Risk (10/20/2023)    Overall Financial Resource Strain (CARDIA)     Difficulty of Paying Living Expenses: Somewhat hard   Transportation Needs: No Transportation Needs (10/20/2023)    PRAPARE - Transportation     Lack of Transportation (Medical): No     Lack of Transportation (Non-Medical): No     No results found.    Objective     Resp 18   Ht 5' 7\" (1.702 m)   Wt 82.1 kg (181 lb)   BMI 28.35 kg/m²     Physical Exam    "

## 2024-10-25 NOTE — ASSESSMENT & PLAN NOTE
Patient is stable with current anti-hypertensive medicine and continue to follow a low sodium diet and take current medication. All questions about this condition were answered today.     Orders:    Comprehensive metabolic panel; Future    CBC and differential; Future    TSH, 3rd generation with Free T4 reflex; Future    Magnesium; Future    Uric acid; Future    UA/M w/rflx Culture, Comp    Comprehensive metabolic panel    CBC and differential    TSH, 3rd generation with Free T4 reflex    Magnesium    Uric acid

## 2024-10-25 NOTE — PROGRESS NOTES
Ambulatory Visit  Name: Devin Cutler      : 1945      MRN: [id number]  Encounter Provider: Momo Trinidad MD  Encounter Date: 10/25/2024   Encounter department: [facility]    Assessment & Plan  Well adult exam         Anxiety  Patient to continue utilizing medical therapy as well as counseling sources as applicable to condition. If suicidal thought or fear of suicide attempt, to call 911 and seek help immediately. Medications and therapy reviewed today and all patient  questions answered today.          Essential hypertension  Patient is stable with current anti-hypertensive medicine and continue to follow a low sodium diet and take current medication. All questions about this condition were answered today.     Orders:    Comprehensive metabolic panel; Future    CBC and differential; Future    TSH, 3rd generation with Free T4 reflex; Future    Magnesium; Future    Uric acid; Future    UA/M w/rflx Culture, Comp    Comprehensive metabolic panel    CBC and differential    TSH, 3rd generation with Free T4 reflex    Magnesium    Uric acid    Gastroesophageal reflux disease without esophagitis  Patient to continue with present therapy and decrease caffeine, avoid ETOH and smoking to decrease acid production. Pt should also cease eating prior to bedtime and avoid excessive fluid intake prior to sleep. May use antacids as needed for breakthrough GERD. All pateint questions answered today about this condition.          Hypothyroidism due to acquired atrophy of thyroid  Patient to continue current dose of thyroid medicine and recheck TSH in 6 months          RLS (restless legs syndrome)  Patient is stable  and will continue present plan of care and reassess at next routine visit. All questions about this problem from patient were answered today.            Falls Plan of Care: balance, strength, and gait training instructions were provided. Home safety education provided.     History of  Present Illness     79-year-old male here today for checkup of multimedical problems patient with need for a flu shot and we will see about giving him 1 of those today.  Patient with hypertension GERD restless leg syndrome hypothyroidism and is doing well patient is overdue for his lab work which we will order for him today because it expires tomorrow patient will call when he needs refills on his prescriptions and we will see him back in approximately 6 months.  Patient is anticipating his wife coming home because she just recently had a stroke and he will be helping taking care of her for her and her convalescence.          Review of Systems        Objective     There were no vitals taken for this visit.    Physical Exam

## 2024-11-27 ENCOUNTER — VBI (OUTPATIENT)
Dept: ADMINISTRATIVE | Facility: OTHER | Age: 79
End: 2024-11-27

## 2024-11-27 NOTE — TELEPHONE ENCOUNTER
11/27/24 8:28 AM     Chart reviewed for BP was/were submitted to the patient's insurance.     David Lovett MA   [facility]

## 2025-01-03 DIAGNOSIS — F41.9 ANXIETY: ICD-10-CM

## 2025-01-03 RX ORDER — ALPRAZOLAM 0.5 MG
TABLET ORAL
Qty: 90 TABLET | Refills: 5 | Status: SHIPPED | OUTPATIENT
Start: 2025-01-03

## 2025-02-17 DIAGNOSIS — M25.551 RIGHT HIP PAIN: ICD-10-CM

## 2025-02-17 RX ORDER — CELECOXIB 200 MG/1
200 CAPSULE ORAL 2 TIMES DAILY
Qty: 180 CAPSULE | Refills: 1 | Status: SHIPPED | OUTPATIENT
Start: 2025-02-17

## 2025-02-18 ENCOUNTER — APPOINTMENT (EMERGENCY)
Dept: RADIOLOGY | Facility: HOSPITAL | Age: 80
DRG: 871 | End: 2025-02-18
Payer: COMMERCIAL

## 2025-02-18 ENCOUNTER — HOSPITAL ENCOUNTER (INPATIENT)
Facility: HOSPITAL | Age: 80
LOS: 5 days | Discharge: HOME HEALTH | DRG: 871 | End: 2025-02-23
Attending: EMERGENCY MEDICINE | Admitting: HOSPITALIST
Payer: COMMERCIAL

## 2025-02-18 ENCOUNTER — APPOINTMENT (EMERGENCY)
Dept: CT IMAGING | Facility: HOSPITAL | Age: 80
DRG: 871 | End: 2025-02-18
Payer: COMMERCIAL

## 2025-02-18 DIAGNOSIS — Z13.9 ENCOUNTER FOR SCREENING INVOLVING SOCIAL DETERMINANTS OF HEALTH (SDOH): ICD-10-CM

## 2025-02-18 DIAGNOSIS — N17.9 AKI (ACUTE KIDNEY INJURY) (HCC): ICD-10-CM

## 2025-02-18 DIAGNOSIS — I48.0 PAROXYSMAL A-FIB (HCC): ICD-10-CM

## 2025-02-18 DIAGNOSIS — I48.91 A-FIB (HCC): ICD-10-CM

## 2025-02-18 DIAGNOSIS — J18.9 PNEUMONIA: Primary | ICD-10-CM

## 2025-02-18 DIAGNOSIS — R55 SYNCOPE: ICD-10-CM

## 2025-02-18 DIAGNOSIS — R78.81 BACTEREMIA: ICD-10-CM

## 2025-02-18 DIAGNOSIS — K81.9 CHOLECYSTITIS: ICD-10-CM

## 2025-02-18 DIAGNOSIS — K75.0 LIVER ABSCESS: ICD-10-CM

## 2025-02-18 LAB
2HR DELTA HS TROPONIN: <-2 NG/L
ABO GROUP BLD: NORMAL
ABO GROUP BLD: NORMAL
ALBUMIN SERPL BCG-MCNC: 3.6 G/DL (ref 3.5–5)
ALP SERPL-CCNC: 94 U/L (ref 34–104)
ALT SERPL W P-5'-P-CCNC: 21 U/L (ref 7–52)
ANION GAP SERPL CALCULATED.3IONS-SCNC: 10 MMOL/L (ref 4–13)
AST SERPL W P-5'-P-CCNC: 26 U/L (ref 13–39)
BASOPHILS # BLD MANUAL: 0 THOUSAND/UL (ref 0–0.1)
BASOPHILS NFR MAR MANUAL: 0 % (ref 0–1)
BILIRUB SERPL-MCNC: 3.52 MG/DL (ref 0.2–1)
BLD GP AB SCN SERPL QL: NEGATIVE
BUN SERPL-MCNC: 18 MG/DL (ref 5–25)
CALCIUM SERPL-MCNC: 8.9 MG/DL (ref 8.4–10.2)
CARDIAC TROPONIN I PNL SERPL HS: 4 NG/L (ref ?–50)
CARDIAC TROPONIN I PNL SERPL HS: <2 NG/L (ref ?–50)
CHLORIDE SERPL-SCNC: 98 MMOL/L (ref 96–108)
CO2 SERPL-SCNC: 25 MMOL/L (ref 21–32)
CREAT SERPL-MCNC: 1.31 MG/DL (ref 0.6–1.3)
EOSINOPHIL # BLD MANUAL: 0 THOUSAND/UL (ref 0–0.4)
EOSINOPHIL NFR BLD MANUAL: 0 % (ref 0–6)
ERYTHROCYTE [DISTWIDTH] IN BLOOD BY AUTOMATED COUNT: 12.6 % (ref 11.6–15.1)
FLUAV RNA RESP QL NAA+PROBE: NEGATIVE
FLUBV RNA RESP QL NAA+PROBE: NEGATIVE
GFR SERPL CREATININE-BSD FRML MDRD: 51 ML/MIN/1.73SQ M
GLUCOSE SERPL-MCNC: 129 MG/DL (ref 65–140)
HCT VFR BLD AUTO: 42 % (ref 36.5–49.3)
HGB BLD-MCNC: 14.4 G/DL (ref 12–17)
LACTATE SERPL-SCNC: 2.5 MMOL/L (ref 0.5–2)
LG PLATELETS BLD QL SMEAR: PRESENT
LYMPHOCYTES # BLD AUTO: 0.36 THOUSAND/UL (ref 0.6–4.47)
LYMPHOCYTES # BLD AUTO: 2 % (ref 14–44)
MCH RBC QN AUTO: 30.6 PG (ref 26.8–34.3)
MCHC RBC AUTO-ENTMCNC: 34.3 G/DL (ref 31.4–37.4)
MCV RBC AUTO: 89 FL (ref 82–98)
MONOCYTES # BLD AUTO: 0 THOUSAND/UL (ref 0–1.22)
MONOCYTES NFR BLD: 0 % (ref 4–12)
NEUTROPHILS # BLD MANUAL: 17.71 THOUSAND/UL (ref 1.85–7.62)
NEUTS SEG NFR BLD AUTO: 98 % (ref 43–75)
PLATELET # BLD AUTO: 249 THOUSANDS/UL (ref 149–390)
PLATELET BLD QL SMEAR: ADEQUATE
PMV BLD AUTO: 8.9 FL (ref 8.9–12.7)
POTASSIUM SERPL-SCNC: 3.9 MMOL/L (ref 3.5–5.3)
PROCALCITONIN SERPL-MCNC: 1.21 NG/ML
PROT SERPL-MCNC: 7.1 G/DL (ref 6.4–8.4)
RBC # BLD AUTO: 4.7 MILLION/UL (ref 3.88–5.62)
RBC MORPH BLD: PRESENT
RH BLD: POSITIVE
RH BLD: POSITIVE
RSV RNA RESP QL NAA+PROBE: NEGATIVE
SARS-COV-2 RNA RESP QL NAA+PROBE: NEGATIVE
SODIUM SERPL-SCNC: 133 MMOL/L (ref 135–147)
SPECIMEN EXPIRATION DATE: NORMAL
WBC # BLD AUTO: 18.07 THOUSAND/UL (ref 4.31–10.16)

## 2025-02-18 PROCEDURE — 85007 BL SMEAR W/DIFF WBC COUNT: CPT

## 2025-02-18 PROCEDURE — 93005 ELECTROCARDIOGRAM TRACING: CPT

## 2025-02-18 PROCEDURE — 86901 BLOOD TYPING SEROLOGIC RH(D): CPT

## 2025-02-18 PROCEDURE — 0241U HB NFCT DS VIR RESP RNA 4 TRGT: CPT

## 2025-02-18 PROCEDURE — 80053 COMPREHEN METABOLIC PANEL: CPT

## 2025-02-18 PROCEDURE — 70450 CT HEAD/BRAIN W/O DYE: CPT

## 2025-02-18 PROCEDURE — 81003 URINALYSIS AUTO W/O SCOPE: CPT

## 2025-02-18 PROCEDURE — 99285 EMERGENCY DEPT VISIT HI MDM: CPT

## 2025-02-18 PROCEDURE — 72125 CT NECK SPINE W/O DYE: CPT

## 2025-02-18 PROCEDURE — 87186 SC STD MICRODIL/AGAR DIL: CPT

## 2025-02-18 PROCEDURE — 86850 RBC ANTIBODY SCREEN: CPT

## 2025-02-18 PROCEDURE — 84145 PROCALCITONIN (PCT): CPT

## 2025-02-18 PROCEDURE — 83605 ASSAY OF LACTIC ACID: CPT

## 2025-02-18 PROCEDURE — 87154 CUL TYP ID BLD PTHGN 6+ TRGT: CPT

## 2025-02-18 PROCEDURE — 96361 HYDRATE IV INFUSION ADD-ON: CPT

## 2025-02-18 PROCEDURE — 87077 CULTURE AEROBIC IDENTIFY: CPT

## 2025-02-18 PROCEDURE — 86900 BLOOD TYPING SEROLOGIC ABO: CPT

## 2025-02-18 PROCEDURE — 84484 ASSAY OF TROPONIN QUANT: CPT

## 2025-02-18 PROCEDURE — 99291 CRITICAL CARE FIRST HOUR: CPT | Performed by: EMERGENCY MEDICINE

## 2025-02-18 PROCEDURE — 85027 COMPLETE CBC AUTOMATED: CPT

## 2025-02-18 PROCEDURE — 71045 X-RAY EXAM CHEST 1 VIEW: CPT

## 2025-02-18 PROCEDURE — 87040 BLOOD CULTURE FOR BACTERIA: CPT

## 2025-02-18 PROCEDURE — 36415 COLL VENOUS BLD VENIPUNCTURE: CPT

## 2025-02-18 PROCEDURE — 96365 THER/PROPH/DIAG IV INF INIT: CPT

## 2025-02-18 RX ORDER — SODIUM CHLORIDE, SODIUM GLUCONATE, SODIUM ACETATE, POTASSIUM CHLORIDE, MAGNESIUM CHLORIDE, SODIUM PHOSPHATE, DIBASIC, AND POTASSIUM PHOSPHATE .53; .5; .37; .037; .03; .012; .00082 G/100ML; G/100ML; G/100ML; G/100ML; G/100ML; G/100ML; G/100ML
1000 INJECTION, SOLUTION INTRAVENOUS ONCE
Status: COMPLETED | OUTPATIENT
Start: 2025-02-19 | End: 2025-02-19

## 2025-02-18 RX ORDER — HEPARIN SODIUM 5000 [USP'U]/ML
5000 INJECTION, SOLUTION INTRAVENOUS; SUBCUTANEOUS EVERY 8 HOURS SCHEDULED
Status: DISCONTINUED | OUTPATIENT
Start: 2025-02-19 | End: 2025-02-23

## 2025-02-18 RX ORDER — SODIUM CHLORIDE, SODIUM GLUCONATE, SODIUM ACETATE, POTASSIUM CHLORIDE, MAGNESIUM CHLORIDE, SODIUM PHOSPHATE, DIBASIC, AND POTASSIUM PHOSPHATE .53; .5; .37; .037; .03; .012; .00082 G/100ML; G/100ML; G/100ML; G/100ML; G/100ML; G/100ML; G/100ML
500 INJECTION, SOLUTION INTRAVENOUS ONCE
Status: DISCONTINUED | OUTPATIENT
Start: 2025-02-19 | End: 2025-02-19

## 2025-02-18 RX ADMIN — SODIUM CHLORIDE 1000 ML: 0.9 INJECTION, SOLUTION INTRAVENOUS at 21:48

## 2025-02-18 RX ADMIN — CEFTRIAXONE SODIUM 2000 MG: 10 INJECTION, POWDER, FOR SOLUTION INTRAVENOUS at 22:53

## 2025-02-19 ENCOUNTER — APPOINTMENT (INPATIENT)
Dept: CT IMAGING | Facility: HOSPITAL | Age: 80
DRG: 871 | End: 2025-02-19
Payer: COMMERCIAL

## 2025-02-19 ENCOUNTER — APPOINTMENT (INPATIENT)
Dept: NON INVASIVE DIAGNOSTICS | Facility: HOSPITAL | Age: 80
DRG: 871 | End: 2025-02-19
Payer: COMMERCIAL

## 2025-02-19 ENCOUNTER — APPOINTMENT (INPATIENT)
Dept: ULTRASOUND IMAGING | Facility: HOSPITAL | Age: 80
DRG: 871 | End: 2025-02-19
Payer: COMMERCIAL

## 2025-02-19 PROBLEM — R55 SYNCOPAL EPISODES: Status: ACTIVE | Noted: 2025-02-19

## 2025-02-19 PROBLEM — A41.9 SEPSIS (HCC): Status: ACTIVE | Noted: 2025-02-19

## 2025-02-19 PROBLEM — J18.9 PNEUMONIA OF RIGHT LOWER LOBE DUE TO INFECTIOUS ORGANISM: Status: ACTIVE | Noted: 2025-02-19

## 2025-02-19 PROBLEM — E80.6 HYPERBILIRUBINEMIA: Status: ACTIVE | Noted: 2025-02-19

## 2025-02-19 PROBLEM — N17.9 AKI (ACUTE KIDNEY INJURY) (HCC): Status: ACTIVE | Noted: 2025-02-19

## 2025-02-19 PROBLEM — R79.89 ELEVATED SERUM CREATININE: Status: ACTIVE | Noted: 2025-02-19

## 2025-02-19 LAB
2HR DELTA HS TROPONIN: 3 NG/L
4HR DELTA HS TROPONIN: 0 NG/L
ALBUMIN SERPL BCG-MCNC: 2.9 G/DL (ref 3.5–5)
ALP SERPL-CCNC: 84 U/L (ref 34–104)
ALT SERPL W P-5'-P-CCNC: 23 U/L (ref 7–52)
ANION GAP SERPL CALCULATED.3IONS-SCNC: 13 MMOL/L (ref 4–13)
ANION GAP SERPL CALCULATED.3IONS-SCNC: 8 MMOL/L (ref 4–13)
ANION GAP SERPL CALCULATED.3IONS-SCNC: 9 MMOL/L (ref 4–13)
AORTIC ROOT: 2.8 CM
AORTIC VALVE MEAN VELOCITY: 12.8 M/S
ASCENDING AORTA: 3.4 CM
AST SERPL W P-5'-P-CCNC: 46 U/L (ref 13–39)
ATRIAL RATE: 66 BPM
AV AREA BY CONTINUOUS VTI: 2.1 CM2
AV AREA PEAK VELOCITY: 2.1 CM2
AV LVOT MEAN GRADIENT: 4 MMHG
AV LVOT PEAK GRADIENT: 6 MMHG
AV MEAN PRESS GRAD SYS DOP V1V2: 7 MMHG
AV ORIFICE AREA US: 2.15 CM2
AV PEAK GRADIENT: 13 MMHG
AV VELOCITY RATIO: 0.68
AV VMAX SYS DOP: 1.81 M/S
BASOPHILS # BLD MANUAL: 0 THOUSAND/UL (ref 0–0.1)
BASOPHILS NFR MAR MANUAL: 0 % (ref 0–1)
BILIRUB DIRECT SERPL-MCNC: 0.54 MG/DL (ref 0–0.2)
BILIRUB SERPL-MCNC: 2.58 MG/DL (ref 0.2–1)
BILIRUB UR QL STRIP: NEGATIVE
BNP SERPL-MCNC: 463 PG/ML (ref 0–100)
BSA FOR ECHO PROCEDURE: 1.92 M2
BUN SERPL-MCNC: 17 MG/DL (ref 5–25)
BUN SERPL-MCNC: 17 MG/DL (ref 5–25)
BUN SERPL-MCNC: 19 MG/DL (ref 5–25)
CA-I BLD-SCNC: 1.1 MMOL/L (ref 1.12–1.32)
CALCIUM SERPL-MCNC: 7.9 MG/DL (ref 8.4–10.2)
CALCIUM SERPL-MCNC: 8 MG/DL (ref 8.4–10.2)
CALCIUM SERPL-MCNC: 8.8 MG/DL (ref 8.4–10.2)
CARDIAC TROPONIN I PNL SERPL HS: 101 NG/L (ref ?–50)
CARDIAC TROPONIN I PNL SERPL HS: 4 NG/L (ref ?–50)
CARDIAC TROPONIN I PNL SERPL HS: 97 NG/L (ref 8–18)
CARDIAC TROPONIN I PNL SERPL HS: 98 NG/L (ref ?–50)
CHLORIDE SERPL-SCNC: 97 MMOL/L (ref 96–108)
CHLORIDE SERPL-SCNC: 98 MMOL/L (ref 96–108)
CHLORIDE SERPL-SCNC: 99 MMOL/L (ref 96–108)
CLARITY UR: CLEAR
CO2 SERPL-SCNC: 23 MMOL/L (ref 21–32)
CO2 SERPL-SCNC: 23 MMOL/L (ref 21–32)
CO2 SERPL-SCNC: 24 MMOL/L (ref 21–32)
COLOR UR: YELLOW
CREAT SERPL-MCNC: 1.01 MG/DL (ref 0.6–1.3)
CREAT SERPL-MCNC: 1.07 MG/DL (ref 0.6–1.3)
CREAT SERPL-MCNC: 1.2 MG/DL (ref 0.6–1.3)
DOP CALC AO VTI: 30.72 CM
DOP CALC LVOT AREA: 3.14 CM2
DOP CALC LVOT CARDIAC INDEX: 3.42 L/MIN/M2
DOP CALC LVOT CARDIAC OUTPUT: 6.56 L/MIN
DOP CALC LVOT DIAMETER: 2 CM
DOP CALC LVOT PEAK VEL VTI: 21.02 CM
DOP CALC LVOT PEAK VEL: 1.2 M/S
DOP CALC LVOT STROKE INDEX: 35.4 ML/M2
DOP CALC LVOT STROKE VOLUME: 66
E WAVE DECELERATION TIME: 140 MS
E/A RATIO: 0.65
EOSINOPHIL # BLD MANUAL: 0 THOUSAND/UL (ref 0–0.4)
EOSINOPHIL NFR BLD MANUAL: 0 % (ref 0–6)
ERYTHROCYTE [DISTWIDTH] IN BLOOD BY AUTOMATED COUNT: 12.8 % (ref 11.6–15.1)
FRACTIONAL SHORTENING: 49 (ref 28–44)
GFR SERPL CREATININE-BSD FRML MDRD: 57 ML/MIN/1.73SQ M
GFR SERPL CREATININE-BSD FRML MDRD: 65 ML/MIN/1.73SQ M
GFR SERPL CREATININE-BSD FRML MDRD: 70 ML/MIN/1.73SQ M
GLUCOSE SERPL-MCNC: 78 MG/DL (ref 65–140)
GLUCOSE SERPL-MCNC: 82 MG/DL (ref 65–140)
GLUCOSE SERPL-MCNC: 87 MG/DL (ref 65–140)
GLUCOSE SERPL-MCNC: 89 MG/DL (ref 65–140)
GLUCOSE UR STRIP-MCNC: NEGATIVE MG/DL
HCT VFR BLD AUTO: 35.9 % (ref 36.5–49.3)
HGB BLD-MCNC: 12.4 G/DL (ref 12–17)
HGB UR QL STRIP.AUTO: NEGATIVE
INR PPP: 1.36 (ref 0.85–1.19)
INTERVENTRICULAR SEPTUM IN DIASTOLE (PARASTERNAL SHORT AXIS VIEW): 1 CM
INTERVENTRICULAR SEPTUM: 1 CM (ref 0.6–1.1)
KETONES UR STRIP-MCNC: NEGATIVE MG/DL
L PNEUMO1 AG UR QL IA.RAPID: NEGATIVE
LAAS-AP2: 18.4 CM2
LAAS-AP4: 15.6 CM2
LACTATE SERPL-SCNC: 2.1 MMOL/L (ref 0.5–2)
LACTATE SERPL-SCNC: 2.2 MMOL/L (ref 0.5–2)
LACTATE SERPL-SCNC: 3.1 MMOL/L (ref 0.5–2)
LEFT ATRIUM SIZE: 3.2 CM
LEFT ATRIUM VOLUME (MOD BIPLANE): 45 ML
LEFT ATRIUM VOLUME INDEX (MOD BIPLANE): 23.4 ML/M2
LEFT INTERNAL DIMENSION IN SYSTOLE: 2.3 CM (ref 2.1–4)
LEFT VENTRICLE DIASTOLIC VOLUME (MOD BIPLANE): 47 ML
LEFT VENTRICLE DIASTOLIC VOLUME INDEX (MOD BIPLANE): 24.5 ML/M2
LEFT VENTRICLE SYSTOLIC VOLUME (MOD BIPLANE): 15 ML
LEFT VENTRICLE SYSTOLIC VOLUME INDEX (MOD BIPLANE): 7.8 ML/M2
LEFT VENTRICULAR INTERNAL DIMENSION IN DIASTOLE: 4.5 CM (ref 3.5–6)
LEFT VENTRICULAR POSTERIOR WALL IN END DIASTOLE: 1.1 CM
LEFT VENTRICULAR STROKE VOLUME: 72 ML
LEUKOCYTE ESTERASE UR QL STRIP: NEGATIVE
LV EF BIPLANE MOD: 67 %
LV EF US.2D.A4C+ESTIMATED: 66 %
LVSV (TEICH): 72 ML
LYMPHOCYTES # BLD AUTO: 0.5 THOUSAND/UL (ref 0.6–4.47)
LYMPHOCYTES # BLD AUTO: 2 % (ref 14–44)
MAGNESIUM SERPL-MCNC: 1.7 MG/DL (ref 1.9–2.7)
MCH RBC QN AUTO: 30.7 PG (ref 26.8–34.3)
MCHC RBC AUTO-ENTMCNC: 34.5 G/DL (ref 31.4–37.4)
MCV RBC AUTO: 89 FL (ref 82–98)
MONOCYTES # BLD AUTO: 0.75 THOUSAND/UL (ref 0–1.22)
MONOCYTES NFR BLD: 3 % (ref 4–12)
MV E'TISSUE VEL-SEP: 13 CM/S
MV PEAK A VEL: 0.82 M/S
MV PEAK E VEL: 53 CM/S
MV STENOSIS PRESSURE HALF TIME: 41 MS
MV VALVE AREA P 1/2 METHOD: 5.37
NEUTROPHILS # BLD MANUAL: 23.73 THOUSAND/UL (ref 1.85–7.62)
NEUTS BAND NFR BLD MANUAL: 8 % (ref 0–8)
NEUTS SEG NFR BLD AUTO: 87 % (ref 43–75)
NITRITE UR QL STRIP: NEGATIVE
P AXIS: 59 DEGREES
PH UR STRIP.AUTO: 6.5 [PH]
PLATELET # BLD AUTO: 209 THOUSANDS/UL (ref 149–390)
PLATELET BLD QL SMEAR: ADEQUATE
PMV BLD AUTO: 9.5 FL (ref 8.9–12.7)
POTASSIUM SERPL-SCNC: 3.5 MMOL/L (ref 3.5–5.3)
POTASSIUM SERPL-SCNC: 3.8 MMOL/L (ref 3.5–5.3)
POTASSIUM SERPL-SCNC: 5.4 MMOL/L (ref 3.5–5.3)
PR INTERVAL: 150 MS
PROCALCITONIN SERPL-MCNC: 4.64 NG/ML
PROT SERPL-MCNC: 6 G/DL (ref 6.4–8.4)
PROT UR STRIP-MCNC: NEGATIVE MG/DL
PROTHROMBIN TIME: 17.5 SECONDS (ref 12.3–15)
QRS AXIS: 58 DEGREES
QRSD INTERVAL: 92 MS
QT INTERVAL: 426 MS
QTC INTERVAL: 446 MS
RBC # BLD AUTO: 4.04 MILLION/UL (ref 3.88–5.62)
RBC MORPH BLD: NORMAL
RIGHT ATRIUM AREA SYSTOLE A4C: 12.5 CM2
RIGHT VENTRICLE ID DIMENSION: 3.7 CM
S PNEUM AG UR QL: NEGATIVE
SL CV LEFT ATRIUM LENGTH A2C: 5.8 CM
SL CV LV EF: 67
SL CV PED ECHO LEFT VENTRICLE DIASTOLIC VOLUME (MOD BIPLANE) 2D: 91 ML
SL CV PED ECHO LEFT VENTRICLE SYSTOLIC VOLUME (MOD BIPLANE) 2D: 19 ML
SODIUM SERPL-SCNC: 130 MMOL/L (ref 135–147)
SODIUM SERPL-SCNC: 131 MMOL/L (ref 135–147)
SODIUM SERPL-SCNC: 133 MMOL/L (ref 135–147)
SP GR UR STRIP.AUTO: 1.02 (ref 1–1.03)
T WAVE AXIS: 64 DEGREES
TR MAX PG: 48 MMHG
TR PEAK VELOCITY: 3.5 M/S
TRICUSPID ANNULAR PLANE SYSTOLIC EXCURSION: 2.6 CM
TRICUSPID VALVE PEAK REGURGITATION VELOCITY: 3.45 M/S
TSH SERPL DL<=0.05 MIU/L-ACNC: 1.07 UIU/ML (ref 0.45–4.5)
UROBILINOGEN UR STRIP-ACNC: 12 MG/DL
VENTRICULAR RATE: 66 BPM
WBC # BLD AUTO: 24.98 THOUSAND/UL (ref 4.31–10.16)

## 2025-02-19 PROCEDURE — 85007 BL SMEAR W/DIFF WBC COUNT: CPT

## 2025-02-19 PROCEDURE — 83605 ASSAY OF LACTIC ACID: CPT

## 2025-02-19 PROCEDURE — 84484 ASSAY OF TROPONIN QUANT: CPT

## 2025-02-19 PROCEDURE — 83735 ASSAY OF MAGNESIUM: CPT

## 2025-02-19 PROCEDURE — 93010 ELECTROCARDIOGRAM REPORT: CPT | Performed by: INTERNAL MEDICINE

## 2025-02-19 PROCEDURE — 85610 PROTHROMBIN TIME: CPT

## 2025-02-19 PROCEDURE — 80048 BASIC METABOLIC PNL TOTAL CA: CPT

## 2025-02-19 PROCEDURE — 83880 ASSAY OF NATRIURETIC PEPTIDE: CPT

## 2025-02-19 PROCEDURE — 84145 PROCALCITONIN (PCT): CPT

## 2025-02-19 PROCEDURE — 76705 ECHO EXAM OF ABDOMEN: CPT

## 2025-02-19 PROCEDURE — 74177 CT ABD & PELVIS W/CONTRAST: CPT

## 2025-02-19 PROCEDURE — 85027 COMPLETE CBC AUTOMATED: CPT

## 2025-02-19 PROCEDURE — 82948 REAGENT STRIP/BLOOD GLUCOSE: CPT

## 2025-02-19 PROCEDURE — 82330 ASSAY OF CALCIUM: CPT

## 2025-02-19 PROCEDURE — 84443 ASSAY THYROID STIM HORMONE: CPT

## 2025-02-19 PROCEDURE — 36415 COLL VENOUS BLD VENIPUNCTURE: CPT

## 2025-02-19 PROCEDURE — 87449 NOS EACH ORGANISM AG IA: CPT

## 2025-02-19 PROCEDURE — 99223 1ST HOSP IP/OBS HIGH 75: CPT | Performed by: HOSPITALIST

## 2025-02-19 PROCEDURE — 93306 TTE W/DOPPLER COMPLETE: CPT

## 2025-02-19 PROCEDURE — 93306 TTE W/DOPPLER COMPLETE: CPT | Performed by: STUDENT IN AN ORGANIZED HEALTH CARE EDUCATION/TRAINING PROGRAM

## 2025-02-19 PROCEDURE — 80076 HEPATIC FUNCTION PANEL: CPT

## 2025-02-19 PROCEDURE — 99232 SBSQ HOSP IP/OBS MODERATE 35: CPT

## 2025-02-19 RX ORDER — LANOLIN ALCOHOL/MO/W.PET/CERES
100 CREAM (GRAM) TOPICAL DAILY
Status: DISCONTINUED | OUTPATIENT
Start: 2025-02-19 | End: 2025-02-19

## 2025-02-19 RX ORDER — METOPROLOL TARTRATE 1 MG/ML
INJECTION, SOLUTION INTRAVENOUS
Status: DISPENSED
Start: 2025-02-19 | End: 2025-02-20

## 2025-02-19 RX ORDER — BENZONATATE 100 MG/1
100 CAPSULE ORAL 3 TIMES DAILY PRN
Status: DISCONTINUED | OUTPATIENT
Start: 2025-02-19 | End: 2025-02-19

## 2025-02-19 RX ORDER — METOPROLOL TARTRATE 1 MG/ML
5 INJECTION, SOLUTION INTRAVENOUS ONCE
Status: COMPLETED | OUTPATIENT
Start: 2025-02-19 | End: 2025-02-19

## 2025-02-19 RX ORDER — METRONIDAZOLE 500 MG/100ML
500 INJECTION, SOLUTION INTRAVENOUS EVERY 8 HOURS
Status: CANCELLED | OUTPATIENT
Start: 2025-02-19

## 2025-02-19 RX ORDER — LEVOTHYROXINE SODIUM 25 UG/1
25 TABLET ORAL
Status: DISCONTINUED | OUTPATIENT
Start: 2025-02-19 | End: 2025-02-23 | Stop reason: HOSPADM

## 2025-02-19 RX ORDER — ROPINIROLE 0.25 MG/1
0.5 TABLET, FILM COATED ORAL 3 TIMES DAILY
Status: DISCONTINUED | OUTPATIENT
Start: 2025-02-19 | End: 2025-02-23 | Stop reason: HOSPADM

## 2025-02-19 RX ORDER — LABETALOL HYDROCHLORIDE 5 MG/ML
10 INJECTION, SOLUTION INTRAVENOUS ONCE
Status: COMPLETED | OUTPATIENT
Start: 2025-02-19 | End: 2025-02-19

## 2025-02-19 RX ORDER — GUAIFENESIN 600 MG/1
1200 TABLET, EXTENDED RELEASE ORAL 2 TIMES DAILY
Status: DISCONTINUED | OUTPATIENT
Start: 2025-02-19 | End: 2025-02-23 | Stop reason: HOSPADM

## 2025-02-19 RX ORDER — FOLIC ACID 1 MG/1
1 TABLET ORAL DAILY
Status: DISCONTINUED | OUTPATIENT
Start: 2025-02-19 | End: 2025-02-19

## 2025-02-19 RX ORDER — LIDOCAINE 50 MG/G
1 PATCH TOPICAL DAILY
Status: DISCONTINUED | OUTPATIENT
Start: 2025-02-19 | End: 2025-02-23 | Stop reason: HOSPADM

## 2025-02-19 RX ORDER — CELECOXIB 100 MG/1
200 CAPSULE ORAL 2 TIMES DAILY
Status: DISCONTINUED | OUTPATIENT
Start: 2025-02-19 | End: 2025-02-19

## 2025-02-19 RX ORDER — PANTOPRAZOLE SODIUM 40 MG/1
40 TABLET, DELAYED RELEASE ORAL
Status: DISCONTINUED | OUTPATIENT
Start: 2025-02-19 | End: 2025-02-23 | Stop reason: HOSPADM

## 2025-02-19 RX ORDER — METOPROLOL TARTRATE 25 MG/1
25 TABLET, FILM COATED ORAL 2 TIMES DAILY
Status: DISCONTINUED | OUTPATIENT
Start: 2025-02-19 | End: 2025-02-20

## 2025-02-19 RX ORDER — CALCIUM GLUCONATE 20 MG/ML
2 INJECTION, SOLUTION INTRAVENOUS ONCE
Status: COMPLETED | OUTPATIENT
Start: 2025-02-19 | End: 2025-02-19

## 2025-02-19 RX ORDER — METOPROLOL TARTRATE 1 MG/ML
INJECTION, SOLUTION INTRAVENOUS CODE/TRAUMA/SEDATION MEDICATION
Status: COMPLETED | OUTPATIENT
Start: 2025-02-19 | End: 2025-02-19

## 2025-02-19 RX ORDER — MAGNESIUM SULFATE HEPTAHYDRATE 40 MG/ML
2 INJECTION, SOLUTION INTRAVENOUS ONCE
Status: COMPLETED | OUTPATIENT
Start: 2025-02-19 | End: 2025-02-20

## 2025-02-19 RX ORDER — METRONIDAZOLE 500 MG/1
500 TABLET ORAL EVERY 8 HOURS SCHEDULED
Status: DISCONTINUED | OUTPATIENT
Start: 2025-02-19 | End: 2025-02-23

## 2025-02-19 RX ORDER — ACETAMINOPHEN 325 MG/1
650 TABLET ORAL EVERY 4 HOURS PRN
Status: DISCONTINUED | OUTPATIENT
Start: 2025-02-19 | End: 2025-02-23 | Stop reason: HOSPADM

## 2025-02-19 RX ORDER — LABETALOL HYDROCHLORIDE 5 MG/ML
INJECTION, SOLUTION INTRAVENOUS
Status: DISPENSED
Start: 2025-02-19 | End: 2025-02-20

## 2025-02-19 RX ORDER — ALPRAZOLAM 0.5 MG
0.5 TABLET ORAL
Status: DISCONTINUED | OUTPATIENT
Start: 2025-02-19 | End: 2025-02-23 | Stop reason: HOSPADM

## 2025-02-19 RX ORDER — SODIUM CHLORIDE, SODIUM GLUCONATE, SODIUM ACETATE, POTASSIUM CHLORIDE, MAGNESIUM CHLORIDE, SODIUM PHOSPHATE, DIBASIC, AND POTASSIUM PHOSPHATE .53; .5; .37; .037; .03; .012; .00082 G/100ML; G/100ML; G/100ML; G/100ML; G/100ML; G/100ML; G/100ML
100 INJECTION, SOLUTION INTRAVENOUS CONTINUOUS
Status: DISPENSED | OUTPATIENT
Start: 2025-02-19 | End: 2025-02-19

## 2025-02-19 RX ORDER — SODIUM CHLORIDE, SODIUM GLUCONATE, SODIUM ACETATE, POTASSIUM CHLORIDE, MAGNESIUM CHLORIDE, SODIUM PHOSPHATE, DIBASIC, AND POTASSIUM PHOSPHATE .53; .5; .37; .037; .03; .012; .00082 G/100ML; G/100ML; G/100ML; G/100ML; G/100ML; G/100ML; G/100ML
500 INJECTION, SOLUTION INTRAVENOUS ONCE
Status: CANCELLED | OUTPATIENT
Start: 2025-02-19 | End: 2025-02-19

## 2025-02-19 RX ORDER — BENZONATATE 100 MG/1
200 CAPSULE ORAL 3 TIMES DAILY
Status: DISCONTINUED | OUTPATIENT
Start: 2025-02-19 | End: 2025-02-23 | Stop reason: HOSPADM

## 2025-02-19 RX ADMIN — METOROPROLOL TARTRATE 5 MG: 5 INJECTION, SOLUTION INTRAVENOUS at 19:22

## 2025-02-19 RX ADMIN — GUAIFENESIN 1200 MG: 600 TABLET ORAL at 22:29

## 2025-02-19 RX ADMIN — AMIODARONE HYDROCHLORIDE 1 MG/MIN: 50 INJECTION, SOLUTION INTRAVENOUS at 20:05

## 2025-02-19 RX ADMIN — ALPRAZOLAM 0.5 MG: 0.5 TABLET ORAL at 01:14

## 2025-02-19 RX ADMIN — ROPINIROLE 0.5 MG: 0.25 TABLET, FILM COATED ORAL at 21:51

## 2025-02-19 RX ADMIN — ALPRAZOLAM 0.5 MG: 0.5 TABLET ORAL at 21:51

## 2025-02-19 RX ADMIN — Medication 1000 UNITS: at 08:21

## 2025-02-19 RX ADMIN — BENZONATATE 100 MG: 100 CAPSULE ORAL at 08:47

## 2025-02-19 RX ADMIN — CALCIUM GLUCONATE 2 G: 20 INJECTION, SOLUTION INTRAVENOUS at 22:29

## 2025-02-19 RX ADMIN — BENZONATATE 100 MG: 100 CAPSULE ORAL at 01:14

## 2025-02-19 RX ADMIN — MAGNESIUM SULFATE HEPTAHYDRATE 2 G: 40 INJECTION, SOLUTION INTRAVENOUS at 23:27

## 2025-02-19 RX ADMIN — SODIUM CHLORIDE, SODIUM GLUCONATE, SODIUM ACETATE, POTASSIUM CHLORIDE, MAGNESIUM CHLORIDE, SODIUM PHOSPHATE, DIBASIC, AND POTASSIUM PHOSPHATE 1000 ML: .53; .5; .37; .037; .03; .012; .00082 INJECTION, SOLUTION INTRAVENOUS at 00:05

## 2025-02-19 RX ADMIN — SODIUM CHLORIDE, SODIUM GLUCONATE, SODIUM ACETATE, POTASSIUM CHLORIDE, MAGNESIUM CHLORIDE, SODIUM PHOSPHATE, DIBASIC, AND POTASSIUM PHOSPHATE 75 ML/HR: .53; .5; .37; .037; .03; .012; .00082 INJECTION, SOLUTION INTRAVENOUS at 03:28

## 2025-02-19 RX ADMIN — CEFTRIAXONE SODIUM 2000 MG: 10 INJECTION, POWDER, FOR SOLUTION INTRAVENOUS at 21:51

## 2025-02-19 RX ADMIN — MULTIPLE VITAMINS W/ MINERALS TAB 1 TABLET: TAB ORAL at 08:21

## 2025-02-19 RX ADMIN — IOHEXOL 100 ML: 350 INJECTION, SOLUTION INTRAVENOUS at 20:21

## 2025-02-19 RX ADMIN — METOROPROLOL TARTRATE 5 MG: 5 INJECTION, SOLUTION INTRAVENOUS at 19:18

## 2025-02-19 RX ADMIN — PANTOPRAZOLE SODIUM 40 MG: 40 TABLET, DELAYED RELEASE ORAL at 05:08

## 2025-02-19 RX ADMIN — GUAIFENESIN 1200 MG: 600 TABLET ORAL at 08:21

## 2025-02-19 RX ADMIN — LEVOTHYROXINE SODIUM 25 MCG: 0.03 TABLET ORAL at 05:08

## 2025-02-19 RX ADMIN — ACETAMINOPHEN 650 MG: 325 TABLET, FILM COATED ORAL at 19:11

## 2025-02-19 RX ADMIN — THIAMINE HYDROCHLORIDE: 100 INJECTION, SOLUTION INTRAMUSCULAR; INTRAVENOUS at 08:43

## 2025-02-19 RX ADMIN — ROPINIROLE 0.5 MG: 0.25 TABLET, FILM COATED ORAL at 08:24

## 2025-02-19 RX ADMIN — LABETALOL HYDROCHLORIDE 10 MG: 5 INJECTION, SOLUTION INTRAVENOUS at 19:08

## 2025-02-19 RX ADMIN — BENZONATATE 200 MG: 100 CAPSULE ORAL at 21:52

## 2025-02-19 RX ADMIN — METRONIDAZOLE 500 MG: 500 TABLET ORAL at 21:52

## 2025-02-19 RX ADMIN — BENZONATATE 200 MG: 100 CAPSULE ORAL at 16:58

## 2025-02-19 RX ADMIN — DEXTROSE 150 MG: 50 INJECTION, SOLUTION INTRAVENOUS at 19:42

## 2025-02-19 RX ADMIN — HEPARIN SODIUM 5000 UNITS: 5000 INJECTION INTRAVENOUS; SUBCUTANEOUS at 14:02

## 2025-02-19 RX ADMIN — ROPINIROLE 0.5 MG: 0.25 TABLET, FILM COATED ORAL at 16:58

## 2025-02-19 RX ADMIN — HEPARIN SODIUM 5000 UNITS: 5000 INJECTION INTRAVENOUS; SUBCUTANEOUS at 05:08

## 2025-02-19 RX ADMIN — SODIUM CHLORIDE, SODIUM GLUCONATE, SODIUM ACETATE, POTASSIUM CHLORIDE, MAGNESIUM CHLORIDE, SODIUM PHOSPHATE, DIBASIC, AND POTASSIUM PHOSPHATE 500 ML: .53; .5; .37; .037; .03; .012; .00082 INJECTION, SOLUTION INTRAVENOUS at 01:17

## 2025-02-19 RX ADMIN — HEPARIN SODIUM 5000 UNITS: 5000 INJECTION INTRAVENOUS; SUBCUTANEOUS at 21:51

## 2025-02-19 NOTE — ED PROVIDER NOTES
Emergency Department Trauma Note  Devin Cutler 80 y.o. male MRN: [id number]  Unit/Bed#: ED-29/ED-29 Encounter: [id number]      Trauma Alert: Trauma Acuity: C  Model of Arrival:   via    Trauma Team: Current Providers  Attending Provider: Mello Gonzales MD  Attending Provider: Nikolas Webster MD  Registered Nurse: Ludmila Elizondo RN  Resident: Celsa Vargas MD  Consultants:     None      History of Present Illness     Chief Complaint:   Chief Complaint   Patient presents with    Syncope     Reports unwell with cough last few days, went out to play pool and suddenly syncopized, pt hit head on chair. Reports syncopizing another time few minutes later.  Denies headaches, blurred vision, dizziness. +daily baby aspirin      HPI:  Devin Cutler is a 80 y.o. male who presents with syncope with fall and head strike  Mechanism:Details of Incident: fall +syncope +ASA Injury Date: 02/18/25        The patient is a 80 year old male who presents to ED with daughter for evaluation of syncope. Daughter states the pt has been coughing for about 2 weeks and had started getting better until yesterday when he had increased coughing and phlegm production. Daughter state the pt took over the counter generic mucinex and cold medications before going to play pool and at the pool [place] he had a syncopal episode while sitting in the chair and fell forward hitting his head on the bar then slid down and hit the back of his head on the chair. He then had another syncopal episode while seated when his daughter arrived to pick him up so she brought him for evaluation. Pt reports he feels a little dizzy/lightheaded currently but he has been able to ambulate without difficulty or pain since the syncopal episode. Daughter notes pt has not been eating or drinking well for 2 days and she has similar cold symptoms. Pt denies back pain, neck pain, headache, SOB, chest pain, abdominal pain, hip pain,       Review of Systems    Constitutional:  Positive for activity change and appetite change. Negative for fever.   Respiratory:  Positive for cough.    Cardiovascular:  Negative for chest pain and leg swelling.   Gastrointestinal:  Negative for abdominal pain, diarrhea and vomiting.   Skin:  Negative for wound.   Neurological:  Positive for dizziness and syncope.       Historical Information     Immunizations:   Immunization History   Administered Date(s) Administered    COVID-19 MODERNA VACC 0.5 ML IM 02/03/2021, 03/03/2021    COVID-19 Moderna Vac BIVALENT 12 Yr+ IM 0.5 ML 12/31/2022    COVID-19 Moderna mRNA Vaccine 12 Yr+ 50 mcg/0.5 mL (Spikevax) 11/24/2023, 11/22/2024    INFLUENZA 11/02/2006, 10/08/2008, 12/08/2010, 02/29/2012, 11/30/2012, 09/25/2013, 10/29/2014, 09/01/2015, 01/03/2018, 12/20/2018, 10/29/2019, 11/01/2023    Influenza Quadrivalent Preservative Free 3 years and older IM 10/29/2019    Influenza Quadrivalent Preservative Free ID 02/11/2022    Influenza Split High Dose Preservative Free IM 10/25/2024    Influenza, high dose seasonal 0.7 mL 10/22/2020, 11/25/2022, 10/20/2023    Influenza, injectable, quadrivalent, preservative free 0.5 mL 10/29/2019    Influenza, seasonal, injectable, preservative free 11/15/2016    Pneumococcal 02/29/2012    Pneumococcal Conjugate 13-Valent 04/06/2017, 01/03/2018    Pneumococcal Conjugate Vaccine 20-valent (Pcv20), Polysace 08/25/2022    Td (adult), Unspecified 01/01/2017    Tdap 01/28/2013, 04/06/2017       Past Medical History:   Diagnosis Date    GERD (gastroesophageal reflux disease)     Right hip pain 4/22/2021       Family History   Family history unknown: Yes     History reviewed. No pertinent surgical history.  Social History     Tobacco Use    Smoking status: Former     Types: Cigarettes     Passive exposure: Past    Smokeless tobacco: Never    Tobacco comments:     per Tamia   Vaping Use    Vaping status: Never Used   Substance Use Topics    Alcohol use: Yes     Comment:  occasional    Drug use: Never     Comment: No use     E-Cigarette/Vaping    E-Cigarette Use Never User      E-Cigarette/Vaping Substances    Nicotine No     THC No     CBD No     Flavoring No     Other No     Unknown No        Family History: non-contributory    Meds/Allergies   Prior to Admission Medications   Prescriptions Last Dose Informant Patient Reported? Taking?   ALPRAZolam (XANAX) 0.5 mg tablet   No No   Sig: TAKE 1 TABLET THREE TIMES DAILY AS NEEDED FOR ANXIETY   Cholecalciferol 125 MCG (5000 UT) capsule   Yes No   Sig: Take 125 mcg by mouth daily   Diclofenac Sodium (VOLTAREN) 1 %   Yes No   Sig: Apply topically   celecoxib (CeleBREX) 200 mg capsule   No No   Sig: TAKE 1 CAPSULE TWICE DAILY   lansoprazole (PREVACID) 30 mg capsule   No No   Sig: TAKE 1 CAPSULE EVERY DAY   levothyroxine (Synthroid) 25 mcg tablet   Yes No   Sig: Take 25 mcg by mouth daily   lidocaine (LIDODERM) 5 %   Yes No   Sig: Apply topically   metoprolol tartrate (LOPRESSOR) 25 mg tablet   No No   Sig: TAKE 1 TABLET TWICE DAILY   rOPINIRole (REQUIP) 0.5 mg tablet   No No   Sig: TAKE 1 TABLET THREE TIMES DAILY      Facility-Administered Medications: None       No Known Allergies    PHYSICAL EXAM    PE limited by: none    Objective   Vitals:   First set: Temperature: 97.7 °F (36.5 °C) (02/18/25 2149)  Pulse: 69 (02/18/25 2049)  Respirations: 20 (02/18/25 2100)  Blood Pressure: (!) 72/40 (02/18/25 2049)  SpO2: 97 % (02/18/25 2049)    Primary Survey:   (A) Airway: patent   (B) Breathing: present bilaterally  (C) Circulation: Pulses:   pedal  2/4  (D) Disabliity:  GCS Total:  15  (E) Expose:  Completed    Secondary Survey: (Click on Physical Exam tab above)  Physical Exam  Vitals and nursing note reviewed.   HENT:      Head: Normocephalic and atraumatic.      Nose: Nose normal.   Cardiovascular:      Rate and Rhythm: Normal rate and regular rhythm.      Pulses: Normal pulses.      Heart sounds: Normal heart sounds.   Pulmonary:      Effort:  Pulmonary effort is normal. No respiratory distress.      Breath sounds: No stridor. Rales present. No wheezing.   Abdominal:      General: Abdomen is flat. Bowel sounds are normal.      Palpations: Abdomen is soft.      Tenderness: There is no abdominal tenderness. There is no guarding or rebound.   Musculoskeletal:         General: No tenderness, deformity or signs of injury. Normal range of motion.      Cervical back: Neck supple. No tenderness.      Comments: Pelvis stable and non tender. No midline spinal tenderness.    Skin:     General: Skin is warm and dry.   Neurological:      General: No focal deficit present.      Mental Status: He is alert and oriented to person, place, and time.   Psychiatric:         Mood and Affect: Mood normal.         Behavior: Behavior normal.         Thought Content: Thought content normal.         Judgment: Judgment normal.         Cervical spine cleared by clinical criteria? No (imaging required)      Invasive Devices       Peripheral Intravenous Line  Duration             Peripheral IV 02/18/25 Right Antecubital <1 day                    Lab Results:   Results Reviewed       Procedure Component Value Units Date/Time    HS Troponin I 2hr [[id number]]  (Normal) Collected: 02/18/25 2320    Lab Status: Final result Specimen: Blood from Arm, Right Updated: 02/18/25 2348     hs TnI 2hr <2 ng/L      Delta 2hr hsTnI <-2 ng/L     HS Troponin I 4hr [[id number]]     Lab Status: No result Specimen: Blood     FLU/RSV/COVID - if FLU/RSV clinically relevant (2hr TAT) [[id number]]  (Normal) Collected: 02/18/25 2149    Lab Status: Final result Specimen: Nares from Nose Updated: 02/18/25 2300     SARS-CoV-2 Negative     INFLUENZA A PCR Negative     INFLUENZA B PCR Negative     RSV PCR Negative    Narrative:      This test has been performed using the CoV-2/Flu/RSV plus assay on the [employer] GeneXpert platform. This test has been validated by the  and verified by the performing  laboratory.     This test is designed to amplify and detect the following: nucleocapsid (N), envelope (E), and RNA-dependent RNA polymerase (RdRP) genes of the SARS-CoV-2 genome; matrix (M), basic polymerase (PB2), and acidic protein (PA) segments of the influenza A genome; matrix (M) and non-structural protein (NS) segments of the influenza B genome, and the nucleocapsid genes of RSV A and RSV B.     Positive results are indicative of the presence of Flu A, Flu B, RSV, and/or SARS-CoV-2 RNA. Positive results for SARS-CoV-2 or suspected novel influenza should be reported to state, local, or federal health departments according to local reporting requirements.      All results should be assessed in conjunction with clinical presentation and other laboratory markers for clinical management.     FOR PEDIATRIC PATIENTS - copy/paste COVID Guidelines URL to browser: [email or web address]       Procalcitonin [[id number]]  (Abnormal) Collected: 02/18/25 2138    Lab Status: Final result Specimen: Blood from Arm, Right Updated: 02/18/25 2238     Procalcitonin 1.21 ng/ml     Lactic acid, plasma (w/reflex if result > 2.0) [[id number]]  (Abnormal) Collected: 02/18/25 2138    Lab Status: Final result Specimen: Blood from Arm, Right Updated: 02/18/25 2208     LACTIC ACID 2.5 mmol/L     Narrative:      Result may be elevated if tourniquet was used during collection.    Lactic acid 2 Hours [[id number]]     Lab Status: No result Specimen: Blood     RBC Morphology Reflex Test [[id number]] Collected: 02/18/25 2112    Lab Status: Final result Specimen: Blood from Arm, Right Updated: 02/18/25 2201    CBC and differential [[id number]]  (Abnormal) Collected: 02/18/25 2112    Lab Status: Final result Specimen: Blood from Arm, Right Updated: 02/18/25 2159     WBC 18.07 Thousand/uL      RBC 4.70 Million/uL      Hemoglobin 14.4 g/dL      Hematocrit 42.0 %      MCV 89 fL      MCH 30.6 pg      MCHC  34.3 g/dL      RDW 12.6 %      MPV 8.9 fL      Platelets 249 Thousands/uL     Narrative:      This is an appended report.  These results have been appended to a previously verified report.    Manual Differential(PHLEBS Do Not Order) [[id number]]  (Abnormal) Collected: 02/18/25 2112    Lab Status: Final result Specimen: Blood from Arm, Right Updated: 02/18/25 2159     Segmented % 98 %      Lymphocytes % 2 %      Monocytes % 0 %      Eosinophils % 0 %      Basophils % 0 %      Absolute Neutrophils 17.71 Thousand/uL      Absolute Lymphocytes 0.36 Thousand/uL      Absolute Monocytes 0.00 Thousand/uL      Absolute Eosinophils 0.00 Thousand/uL      Absolute Basophils 0.00 Thousand/uL      Total Counted --     RBC Morphology Present     Platelet Estimate Adequate     Large Platelet Present    Blood culture #1 [[id number]] Collected: 02/18/25 2138    Lab Status: In process Specimen: Blood from Arm, Right Updated: 02/18/25 2149    Blood culture #2 [[id number]] Collected: 02/18/25 2145    Lab Status: In process Specimen: Blood from Arm, Right Updated: 02/18/25 2149    HS Troponin 0hr (reflex protocol) [[id number]]  (Normal) Collected: 02/18/25 2112    Lab Status: Final result Specimen: Blood from Arm, Right Updated: 02/18/25 2149     hs TnI 0hr 4 ng/L     Comprehensive metabolic panel [[id number]]  (Abnormal) Collected: 02/18/25 2112    Lab Status: Final result Specimen: Blood from Arm, Right Updated: 02/18/25 2143     Sodium 133 mmol/L      Potassium 3.9 mmol/L      Chloride 98 mmol/L      CO2 25 mmol/L      ANION GAP 10 mmol/L      BUN 18 mg/dL      Creatinine 1.31 mg/dL      Glucose 129 mg/dL      Calcium 8.9 mg/dL      AST 26 U/L      ALT 21 U/L      Alkaline Phosphatase 94 U/L      Total Protein 7.1 g/dL      Albumin 3.6 g/dL      Total Bilirubin 3.52 mg/dL      eGFR 51 ml/min/1.73sq m     Narrative:      National Kidney Disease Foundation guidelines for Chronic Kidney Disease (CKD):     Stage 1 with normal or high GFR (GFR >  90 mL/min/1.73 square meters)    Stage 2 Mild CKD (GFR = 60-89 mL/min/1.73 square meters)    Stage 3A Moderate CKD (GFR = 45-59 mL/min/1.73 square meters)    Stage 3B Moderate CKD (GFR = 30-44 mL/min/1.73 square meters)    Stage 4 Severe CKD (GFR = 15-29 mL/min/1.73 square meters)    Stage 5 End Stage CKD (GFR <15 mL/min/1.73 square meters)  Note: GFR calculation is accurate only with a steady state creatinine    UA w Reflex to Microscopic w Reflex to Culture [[id number]]     Lab Status: No result Specimen: Urine                    Imaging Studies:   Direct to CT: Yes  TRAUMA - CT head wo contrast   Final Result by Kacy Vega MD (02/18 2150)      No acute intracranial abnormality.      Air-fluid level with frothy secretions in the right maxillary sinus may represent acute sinusitis in the appropriate setting.         I personally discussed this study with CELSA KURTZ on 2/18/2025 9:49 PM.                        Workstation performed: [id number]         TRAUMA - CT spine cervical wo contrast   Final Result by Kacy Vega MD (02/18 2151)      No cervical spine fracture or traumatic malalignment.         I personally discussed this study with CELSA KURTZ on 2/18/2025 9:49 PM.            Workstation performed: [id number]         XR Trauma chest portable   Final Result by Vero Cannon MD (02/18 2140)      No acute pulmonary pathology.      No obvious displaced fractures within limitation of supine AP chest technique.                  Workstation performed: [id number]               Procedures  ECG 12 Lead Documentation Only    Date/Time: 2/18/2025 9:05 PM    Performed by: Celsa Kurtz MD  Authorized by: Celsa Kurtz MD    Indications / Diagnosis:  Syncope  ECG reviewed by me, the ED Provider: yes    Patient location:  ED  Previous ECG:     Previous ECG:  Unavailable  Interpretation:     Interpretation: normal    Rate:     ECG rate:  66    ECG rate assessment: normal    Rhythm:      Rhythm: sinus rhythm    Ectopy:     Ectopy: none    QRS:     QRS axis:  Normal    QRS intervals:  Normal  Conduction:     Conduction: normal    ST segments:     ST segments:  Normal  T waves:     T waves: normal             ED Course  ED Course as of 02/18/25 2355   Tue Feb 18, 2025b 18, 2025 2138 WBC(!): 18.07   2146 Creatinine(!): 1.31  Increased from prior   2146 Sodium(!): 133   2146 Total Bilirubin(!): 3.52  elevated   2153 Recheck of pt to discuss CT resutls and concern for pna with possible right lower infiltrate on CXR. Ffered admission, but pt is relectant. Will wait for additional lab results.    2217 LACTIC ACID(!): 2.5   2241 Procalcitonin(!): 1.21   2259 hs TnI 0hr: 4   2312 Discussed with pt and daughter offer and recommendation for admission. He is concerned about his wife at home who he cares for. Pt ultimately agreeable to admission at this time.            Medical Decision Making  DDX: intracranial bleed, fracture, flu, Covid, RSV, PNA, electrolyte abnormality, dehydration, other    Pt with productive cough initially improving then worse over past 2 days with decreased PO intake with syncope x 2 with head strike on ASA.   CT head and c-spine negative for acute injury  CXR with infiltrate in RLL   Leukocytosis, elevated lacti and procal.   Pt given IV rocephin  RANDAL noted on CMP with elevated creatinine  Pt presented with SPB in 70's. IVF bolus administered and pt have good improvement of BP to resuscitation.   EKG without acute ischemia or arrhythmia.   Pt admitted to [facility] service.       Amount and/or Complexity of Data Reviewed  Independent Historian: caregiver     Details: Daughter  Labs: ordered. Decision-making details documented in ED Course.  Radiology: ordered.    Risk  Decision regarding hospitalization.                Disposition  [facility] Transfer: No  Final diagnoses:   RANDAL (acute kidney injury) (HCC)   Pneumonia   Syncope     Time reflects when diagnosis was documented in both MDM as  applicable and the Disposition within this note       Time User Action Codes Description Comment    2/18/2025 11:23 PM Celsa Vargas Add [N17.9] RANDAL (acute kidney injury) (HCC)     2/18/2025 11:23 PM Celsa Vargas Amanda Add [J18.9] Pneumonia     2/18/2025 11:23 PM Celsa Vargas Add [R55] Syncope     2/18/2025 11:23 PM Celsa Vargas Modify [N17.9] RANDAL (acute kidney injury) ([facility])     2/18/2025 11:23 PM Celsa Vargas Modify [J18.9] Pneumonia           ED Disposition       ED Disposition   Admit    Condition   Stable    Date/Time   Tue Feb 18, 2025 11:39 PM    Comment   Case was discussed with NANI and the patient's admission status was agreed to be Admission Status: inpatient status to the service of Dr. Nelson .               Follow-up Information    None       Patient's Medications   Discharge Prescriptions    No medications on file     No discharge procedures on file.    PDMP Review         Value Time User    PDMP Reviewed  Yes 1/3/2025  5:55 PM Momo Trinidad MD            ED Provider  Electronically Signed by           Celsa Vargas MD  02/18/25 2744

## 2025-02-19 NOTE — ASSESSMENT & PLAN NOTE
Recent Labs     02/18/25  2112   AST 26   ALT 21   ALKPHOS 94   TBILI 3.52*     UA: positive for urobilinogen  History of drinking about 9 drinks per week (3 beers three times per week while playing pole)  No abdominal pain, N/V. Abdomen nontender on exam. Suspect possibly related to sepsis. Hemoglobin normal, low suspicion due to hemolytic anemia at this time.    Plan:  Continue IVF  Continue to trend LFTs  F/u AM INR to assess for liver function  Continue to monitor for abdominal pain  If elevated bilirubin in the morning, consider CT A/P vs RUQ US due to RANDAL

## 2025-02-19 NOTE — ASSESSMENT & PLAN NOTE
Recent Labs     02/18/25 2112   CREATININE 1.31*   EGFR 51     Estimated Creatinine Clearance: 46.9 mL/min (A) (by C-G formula based on SCr of 1.31 mg/dL (H)).    POA: Cr: 1.31 (baseline 0.9-1.0), GFR: 51 (baseline 80s)  Likely prerenal 2/2 dehydration/hypotension 2/2 sepsis 2/2 PNA  S/p 1L NSS IVF bolus    Plan:  IV Fluids: continue 1.5L Isolyte IVF bolus to finish 30cc/kg  Monitor BMP  Avoid hypoperfusion of the kidneys, minimize nephrotoxins  Held Celebrex, Held Voltaren gel, Held Metoprolol Tartrate

## 2025-02-19 NOTE — SEPSIS NOTE
"  Sepsis Note   Devin Cutler 80 y.o. male MRN: [id number]  Unit/Bed#: ED-29 Encounter: [id number]       Initial Sepsis Screening       Row Name 02/18/25 2232                Is the patient's history suggestive of a new or worsening infection? Yes (Proceed)  -KK        Suspected source of infection pneumonia  -KK        Indicate SIRS criteria Leukocytosis (WBC > 27319 IJL) OR Leukopenia (WBC <4000 IJL) OR Bandemia (WBC >10% bands)  -KK        Are two or more of the above signs & symptoms of infection both present and new to the patient? Yes (Proceed)  -KK        Assess for evidence of organ dysfunction: Are any of the below criteria present within 6 hours of suspected infection and SIRS criteria that are NOT considered to be chronic conditions? Lactate > 2.0  -KK        Date of presentation of severe sepsis 02/18/25  -KK        Time of presentation of severe sepsis 2234  -KK        Sepsis Note: Click \"NEXT\" below (NOT \"close\") to generate sepsis note based on above information. --                  User Key  (r) = Recorded By, (t) = Taken By, (c) = Cosigned By      Initials Name Provider Type    [facility] Vargas MD Resident                        There is no height or weight on file to calculate BMI.  Wt Readings from Last 1 Encounters:   10/25/24 82.1 kg (181 lb)        Ideal body weight: 66.1 kg (145 lb 11.6 oz)  Adjusted ideal body weight: 72.5 kg (159 lb 13.4 oz)    "

## 2025-02-19 NOTE — ASSESSMENT & PLAN NOTE
History of Right hip pain  Hold Celebrex and Voltaren gel due to RANDAL  Continue lidocaine patch  Continue Tyelnol prn for pain

## 2025-02-19 NOTE — ASSESSMENT & PLAN NOTE
Initial Blood pressure on admission 72/40. Suspect dehydration/hypotension 2/2 sepsis 2/2 PNA.  S/p 1L NSS IVF bolus  Home regimen: Metoprolol Tartrate 25mg BID    Plan:  IV Fluids: continue 1.5L Isolyte IVF bolus to finish 30cc/kg  Hold Metoprolol Tartrate

## 2025-02-19 NOTE — QUICK NOTE
Re-evaluated patient after vital signs showed increased RR: 24, increased blood pressure. S/p 1.6 L IVF bolus in total.       Repeat vitals: BP: 112/56. RR 24, SpO2 93   Patient denies any SOB, but continues to have cough. While in the room, patient sleeping on right side in no distress.  lungs note slightly worse rales bilateral, Right rales and rhonchi noted.     Plan:  Will hold on further IVF bolus due to concerns of patient becoming fluid overload  Will switch to 75cc/hr x10 hours  Will obtain repeat lactic acid in the morning

## 2025-02-19 NOTE — H&P
H&P - Hospitalist   Name: Devin Cutler 80 y.o. male I MRN: [id number]  Unit/Bed#: ED-29 I Date of Admission: 2/18/2025   Date of Service: 2/19/2025 I Hospital Day: 1     Assessment & Plan  Sepsis (HCC)  Meet Sepsis criteria with tachypnea, hypotension, leukocytosis, and lactic acidosis with suspected PNA etiology.  Reports productive cough over the past two weeks with significant worsening over the last two days with fatigue and poor PO intake with known sick contacts.  Recent Labs     02/18/25  2112   WBC 18.07*     Recent Labs     02/18/25 2138   PROCALCITONI 1.21*     Recent Labs     02/18/25 2138 02/18/25  2359   LACTICACID 2.5* 2.1*     CXR negative, however increased rales in RLL on exam. Suspect CXR did not show RLL consolidation due to dehydration.  CT head showed possible acute right maxillary sinusitis   DRIP Score: 1  S/p 1LNSS IVF bolus and IV ceftriaxone in the ED    Plan:  Continue ceftriaxone q24h  Continue 30cc/kg IVF- ordered 1.5L bolus  F/u blood cultures  F/u Urine cultures  F/u lactic acid in the AM  F/u Procal AM  F/u Urine strep and legionella  F/u Sputum cultures  Mucinex 1200mg BID   [place]es[place]oon Perles 100mg TID prn for cough  Incentive Spirometry  Continue to monitor respiratory       Pneumonia of right lower lobe due to infectious organism  Reports productive cough over the past two weeks with significant worsening over the last two days with fatigue and poor PO intake with known sick contacts  Meet Sepsis Criteria on admission (hypotension, tachypnea, leukocytosis, lactic acidosis)  CT head noted right maxillary sinus may represent acute sinusitis  CXR negative, however increased rales in RLL on exam. Suspect CXR did not show RLL consolidation due to dehydration.  Recent Labs     02/18/25 2138   PROCALCITONI 1.21*       DRIP Score: 1  S/p 1LNSS IVF bolus and IV ceftriaxone in the ED        Plan:  Continue ceftriaxone q24h  Continue 30cc/kg IVF- ordered 1.5L bolus  F/u blood cultures  F/u  Urine cultures  F/u lactic acid in the AM  F/u Procal AM  F/u Urine strep and legionella  F/u Sputum cultures  Mucinex 1200mg BID   [place]oon Perles 100mg TID prn for cough  Incentive Spirometry  Continue to monitor respiratory   Elevated serum creatinine  Recent Labs     02/18/25 2112   CREATININE 1.31*   EGFR 51     Estimated Creatinine Clearance: 46.9 mL/min (A) (by C-G formula based on SCr of 1.31 mg/dL (H)).    POA: Cr: 1.31 (baseline 0.9-1.0), GFR: 51 (baseline 80s)  Likely prerenal 2/2 dehydration/hypotension 2/2 sepsis 2/2 PNA  S/p 1L NSS IVF bolus    Plan:  IV Fluids: continue 1.5L Isolyte IVF bolus to finish 30cc/kg  Monitor BMP  Avoid hypoperfusion of the kidneys, minimize nephrotoxins  Held Celebrex, Held Voltaren gel, Held Metoprolol Tartrate     Hyperbilirubinemia  Recent Labs     02/18/25 2112   AST 26   ALT 21   ALKPHOS 94   TBILI 3.52*     UA: positive for urobilinogen  History of drinking about 9 drinks per week (3 beers three times per week while playing pole)  No abdominal pain, N/V. Abdomen nontender on exam. Suspect possibly related to sepsis. Hemoglobin normal, low suspicion due to hemolytic anemia at this time.    Plan:  Continue IVF  Continue to trend LFTs  F/u AM INR to assess for liver function  Continue to monitor for abdominal pain  If elevated bilirubin in the morning, consider CT A/P vs RUQ US due to RANDAL    Syncopal episodes  Presented to the ED after syncopal episode x2 while playing pool. Hit head on fall.  Trauma work up negative, including negative CT head  Meet Sepsis criteria with tachypnea, hypotension, leukocytosis, and lactic acidosis with suspected PNA etiology. Initial BP 72/40, responded well to IVF.  Reports productive cough over the past two weeks with significant worsening over the last two days with fatigue and poor PO intake with known sick contacts.  EKG NSR, rate 66  Suspect syncopal episodes 2/2 sepsis 2/2 PNA    Plan:  Continue IVF as listed above  Monitor on tele  overnight, consider discontinuing in the morning   Essential hypertension  Initial Blood pressure on admission 72/40. Suspect dehydration/hypotension 2/2 sepsis 2/2 PNA.  S/p 1L NSS IVF bolus  Home regimen: Metoprolol Tartrate 25mg BID    Plan:  IV Fluids: continue 1.5L Isolyte IVF bolus to finish 30cc/kg  Hold Metoprolol Tartrate     Anxiety  History of anxiety  Continue Xanax 0.5mg qhs prn  GERD (gastroesophageal reflux disease)  Home regimen: lansoprazole 30mg  Continue Pantoprazole 40mg PO while inpatient  RLS (restless legs syndrome)  Continue Ropinirole 0.5mg TID prn  Hypothyroidism due to acquired atrophy of thyroid  Continue Synthroid 25mg    Right hip pain  History of Right hip pain  Hold Celebrex and Voltaren gel due to RANDAL  Continue lidocaine patch  Continue Tyelnol prn for pain      VTE Pharmacologic Prophylaxis: VTE Score: 6 High Risk (Score >/= 5) - Pharmacological DVT Prophylaxis Ordered: heparin. Sequential Compression Devices Ordered.  Code Status: Level 3 - DNAR and DNI, discussed with patient  Discussion with family: Updated  (daughter) at bedside.    Anticipated Length of Stay: Patient will be admitted on an inpatient basis with an anticipated length of stay of greater than 2 midnights secondary to sepsis secondary to pneumonia and RANDAL.    History of Present Illness   Chief Complaint: Syncopal episode    Devin Cutler is a 80 y.o. male with a PMH including HTN, Anxiety, GERD, Hypothyroidism, RLS, right hip pain who presents to the ED after syncopal episode.    Reports productive cough for the past 2 weeks, which improved on Sunday.  However noted over the past 2 days, cough significantly worsened with associated fatigue, poor p.o. intake. Denies any shortness of breath, ear pain, sore throat, chest pain, fever, chills.  Of note, daughter, son-in-law, grandchildren are all sick with similar symptoms.      He went to the bar to play pool, when he had a syncopal episode x 2. Noted he  was drinking soda at the time. Denies any symptoms prior to syncope.  Denies any lightheadedness, dizziness, headache, vision changes, chest pain, shortness of breath, abdominal pain, nausea, vomiting.    In ED, presented hypotensive 72/40, remaining vitals stable. Work up significant for leukocytosis 18k, hyponatremia 133, RANDAL  with elevated Cr: 1.31 from baseline of 1.0, lactic acidosis, elevated procal, hyperbilirubinemia 3.52, negative Flu/Covid/RSV, CXR negative. CT head negative for acute intracranial, with right maxillary acute sinusitis. CT cervical spine negative. In the ED, patient received IV ceftriaxone, 1L IVF bolus       Review of Systems   Constitutional:  Positive for activity change (decreased) and appetite change (decreased). Negative for chills, diaphoresis and fever.   HENT:  Positive for sinus pressure (mild). Negative for congestion, ear pain, rhinorrhea and sore throat.    Eyes:  Negative for discharge and visual disturbance.   Respiratory:  Positive for cough. Negative for shortness of breath and wheezing.    Cardiovascular:  Negative for chest pain, palpitations and leg swelling.   Gastrointestinal:  Negative for abdominal pain, blood in stool, constipation, diarrhea, nausea and vomiting.   Genitourinary:  Negative for dysuria and hematuria.   Skin:  Negative for rash.   Neurological:  Negative for dizziness, light-headedness and headaches.       Historical Information   Past Medical History:   Diagnosis Date    GERD (gastroesophageal reflux disease)     Right hip pain 4/22/2021     History reviewed. No pertinent surgical history.  Social History     Tobacco Use    Smoking status: Former     Types: Cigarettes     Passive exposure: Past    Smokeless tobacco: Never    Tobacco comments:     per [place]   Vaping Use    Vaping status: Never Used   Substance and Sexual Activity    Alcohol use: Yes     Comment: occasional    Drug use: Never     Comment: No use    Sexual activity: Not on file      E-Cigarette/Vaping    E-Cigarette Use Never User      E-Cigarette/Vaping Substances    Nicotine No     THC No     CBD No     Flavoring No     Other No     Unknown No      Family History   Family history unknown: Yes     Social History:  Marital Status: /Civil Union   Occupation: retired, previous , [employer]  Patient Pre-hospital Living Situation: Home, With spouse  Patient Pre-hospital Level of Mobility: walks  Patient Pre-hospital Diet Restrictions: None    Meds/Allergies   I have reviewed home medications with patient personally.  Prior to Admission medications    Medication Sig Start Date End Date Taking? Authorizing Provider   ALPRAZolam (XANAX) 0.5 mg tablet TAKE 1 TABLET THREE TIMES DAILY AS NEEDED FOR ANXIETY 1/3/25   Momo Trinidad MD   celecoxib (CeleBREX) 200 mg capsule TAKE 1 CAPSULE TWICE DAILY 2/17/25   Momo Trinidad MD   Cholecalciferol 125 MCG (5000 UT) capsule Take 125 mcg by mouth daily 7/12/23   Historical Provider, MD   Diclofenac Sodium (VOLTAREN) 1 % Apply topically 5/30/24   Historical Provider, MD   lansoprazole (PREVACID) 30 mg capsule TAKE 1 CAPSULE EVERY DAY 10/19/24   Momo Trinidad MD   levothyroxine (Synthroid) 25 mcg tablet Take 25 mcg by mouth daily 7/12/23   Historical Provider, MD   lidocaine (LIDODERM) 5 % Apply topically 5/23/24   Historical Provider, MD   metoprolol tartrate (LOPRESSOR) 25 mg tablet TAKE 1 TABLET TWICE DAILY 10/19/24   Momo Trinidad MD   rOPINIRole (REQUIP) 0.5 mg tablet TAKE 1 TABLET THREE TIMES DAILY 10/19/24   Momo Trinidad MD     No Known Allergies    Objective :  Temp:  [97.7 °F (36.5 °C)-98 °F (36.7 °C)] 98 °F (36.7 °C)  HR:  [] 100  BP: ()/(40-88) 131/61  Resp:  [18-24] 24  SpO2:  [95 %-98 %] 96 %  O2 Device: None (Room air)    Physical Exam  Vitals and nursing note reviewed.   Constitutional:       General: He is not in acute distress.     Appearance: He is well-developed.   HENT:      Head: Normocephalic  "and atraumatic.      Mouth/Throat:      Mouth: Mucous membranes are moist.      Pharynx: Oropharynx is clear. No oropharyngeal exudate or posterior oropharyngeal erythema.   Eyes:      Conjunctiva/sclera: Conjunctivae normal.   Cardiovascular:      Rate and Rhythm: Normal rate and regular rhythm.      Heart sounds: No murmur heard.  Pulmonary:      Effort: Pulmonary effort is normal. No respiratory distress.      Breath sounds: Rhonchi (Right Lower Lobe) and rales (bibasilar) present. No wheezing.   Abdominal:      General: Bowel sounds are normal. There is distension.      Palpations: Abdomen is soft.      Tenderness: There is no abdominal tenderness. There is no guarding.   Musculoskeletal:         General: No swelling.      Cervical back: Neck supple.      Right lower leg: Edema (trace) present.      Left lower leg: Edema (trace) present.   Skin:     General: Skin is warm and dry.      Capillary Refill: Capillary refill takes more than 3 seconds.   Neurological:      Mental Status: He is alert.   Psychiatric:         Mood and Affect: Mood normal.          Lines/Drains:            Lab Results: I have reviewed the following results:  Results from last 7 days   Lab Units 02/18/25  2112   WBC Thousand/uL 18.07*   HEMOGLOBIN g/dL 14.4   HEMATOCRIT % 42.0   PLATELETS Thousands/uL 249   LYMPHO PCT % 2*   MONO PCT % 0*   EOS PCT % 0     Results from last 7 days   Lab Units 02/18/25  2112   SODIUM mmol/L 133*   POTASSIUM mmol/L 3.9   CHLORIDE mmol/L 98   CO2 mmol/L 25   BUN mg/dL 18   CREATININE mg/dL 1.31*   ANION GAP mmol/L 10   CALCIUM mg/dL 8.9   ALBUMIN g/dL 3.6   TOTAL BILIRUBIN mg/dL 3.52*   ALK PHOS U/L 94   ALT U/L 21   AST U/L 26   GLUCOSE RANDOM mg/dL 129             No results found for: \"HGBA1C\"  Results from last 7 days   Lab Units 02/18/25  2359 02/18/25  2138   LACTIC ACID mmol/L 2.1* 2.5*   PROCALCITONIN ng/ml  --  1.21*       Imaging Results Review: I reviewed radiology reports from this admission " including: chest xray, CT head, and CT C-spine.  Other Study Results Review: EKG was reviewed.         Tobacco and Toxic Substance Assessment and Intervention:     Tobacco use screening performed    Alcohol and drug use screening performed      Other interventions: No tobacco use      ** Please Note: This note has been constructed using a voice recognition system. **

## 2025-02-19 NOTE — UTILIZATION REVIEW
Initial Clinical Review    Admission: Date/Time/Statement:   Admission Orders (From admission, onward)       Ordered        02/18/25 2339  INPATIENT ADMISSION  Once                          Orders Placed This Encounter   Procedures    INPATIENT ADMISSION     Standing Status:   Standing     Number of Occurrences:   1     Level of Care:   Med Surg [16]     Estimated length of stay:   More than 2 Midnights     Certification:   I certify that inpatient services are medically necessary for this patient for a duration of greater than two midnights. See H&P and MD Progress Notes for additional information about the patient's course of treatment.     ED Arrival Information       Expected   -    Arrival   2/18/2025 20:38    Acuity   Emergent              Means of arrival   Walk-In    Escorted by   Family Member    Service   Hospitalist    Admission type   Emergency              Arrival complaint   Syncope             Chief Complaint   Patient presents with    Syncope     Reports unwell with cough last few days, went out to play pool and suddenly syncopized, pt hit head on chair. Reports syncopizing another time few minutes later.  Denies headaches, blurred vision, dizziness. +daily baby aspirin        Initial Presentation: 80 y.o. male with hx  HTN, Anxiety, GERD, Hypothyroidism, RLS, right hip pain who presents to the ED from home after syncopal episode x2 .+ head strike .  Pt reports cough x 2 weeks, with cough over the past 2 days,  significantly worsened with associated fatigue, poor p.o. intake . Known sick contacts  On exam, pt hypotensive 72/40. Pt has R lower lobe rhonchi, bibasilar rales . Trace BLE edema .DRIP Score: 1 .  Labs WBC 18, Na 133, creat 1.31 from baseline  1.0.    Elevated lactic acid 2.5--->2.1  ,  procal 1.21, T biIi 3.52 . UA: positive for urobilinogen  CXR w/o acute findings . CT head negative for acute intracranial, shows  right maxillary acute sinusitis. CT cervical spine negative. In the ED,  patient received IV ceftriaxone, IVF in ED. Admitted as Inpatient to telemetry with sepsis with suspected PNA .acute sinusitis. Syncope .  PLan  : IV Ceftriaxone . IVF - 30 ml/kg - ordered 1.5 L bolus . F/U urine and blood cx, urine antigens.  AM lactic acid, procal.  Tessalon perles, Mucinex. Trend LFT's , T bili . If T bili remains elevated in am , consider CT A/P vs RUQ US due to elevated creat . Telemetry . Hold home metoprolol. Monitor BP .   Anticipated Length of Stay/Certification Statement: Patient will be admitted on an inpatient basis with an anticipated length of stay of greater than 2 midnights secondary to sepsis secondary to pneumonia and RANDAL.     Date: 2/19    Day 2:    Overnight ,  received 1.6 L IVF bolus in total. BP increased to 112/56 . Pt denies any SOB, but continues to have cough. While in the room, patient sleeping on right side in no distress.  Lungs note slightly worse rales bilateral, Right rales and rhonchi noted.  Hold on further IVF bolus due to concerns of patient becoming fluid overload . BNP elevated on admission . Will switch to 75cc/hr x10 hours. Obtain echo .   This am, Lactic acid up to 3.1 . IVF increased to 100 ml /hr  . Consider 500cc/bolus if patient is still dry on exam. Repeat BMP due to hemolyzed potassium level. . Creat down to 1.01 . Ordered RUQ US due to elevated direct bilirubin with elevated INR and 2:1 AST: ALT.  History of drinking 9 beers per week. Added CIWA protocol with folate, thiamine, and MV supplementation .CIWA 0 .  RA sat 96 % . 1/2 blood cx + GNR . F/U BC ID .Procal up to 4.64 today .  Continue IV ceftriaxone . PT/OT .     Date: 2/20   Day 3: Has surpassed a 2nd midnight with active treatments and services.    Sepsis . Bacteremia ,suspected  PNA  .Syncopal episode   Pt had rapid response called last night 2/2 acute change in HR . A fib w/ RVR, rates 160's-180's .  's-130's . Given 5mg IV metoprolol with some improvement in rates to 130s. Ordered  amio bolus and gtt. Stat labs ordered . Ordered telemetry . Repleted Mag and calcium that were low .   TSH WNL . Troponins 98--->101--->112 . Pt given 2.5 mg IV Lopressor overnight with HR up to 150- 160's while on Amiodarone drip . HR this am 70's-80's in NSR on telemetry . Start  metoprolol 25 mg daily . Metoprolol 25 mg BID has been on hold since admission  .Continue Amiodarone x 24 h . Iitiation of AC will depends on if acute intervention of cholecystitis is indicated.   Pt on IV Ceftriaxone, po Flagyl added last night . US RUQ 2/19 showed gallbladder sludge w/ wall thickening . CT A/P done today that shows aristeo distended gallbladder reflecting Paula cystitis. Loculated hypodense lesion in adjacent liver not fully characterized by concerning for pericholecystic abscess  . 2/2 Blood culture growing E. coli suspect translocation from cholecystitis or translocation from GI. Consult to general sx placed today . T bili down to 1.04    .    ED Treatment-Medication Administration from 02/18/2025 2038 to 02/19/2025 1253         Date/Time Order Dose Route Action     02/18/2025 2148 sodium chloride 0.9 % bolus 1,000 mL 1,000 mL Intravenous New Bag     02/18/2025 2253 ceftriaxone (ROCEPHIN) 2 g/50 mL in dextrose IVPB 2,000 mg Intravenous New Bag     02/19/2025 0005 multi-electrolyte (ISOLYTE-S PH 7.4) bolus 1,000 mL 1,000 mL Intravenous New Bag     02/19/2025 0117 multi-electrolyte (ISOLYTE-S PH 7.4) bolus 500 mL 500 mL Intravenous New Bag     02/19/2025 0508 heparin (porcine) subcutaneous injection 5,000 Units 5,000 Units Subcutaneous Given     02/19/2025 0821 guaiFENesin (MUCINEX) 12 hr tablet 1,200 mg 1,200 mg Oral Given     02/19/2025 0114 benzonatate (TESSALON PERLES) capsule 100 mg 100 mg Oral Given     02/19/2025 0847 benzonatate (TESSALON PERLES) capsule 100 mg 100 mg Oral Given     02/19/2025 0114 ALPRAZolam (XANAX) tablet 0.5 mg 0.5 mg Oral Given     02/19/2025 0832 Cholecalciferol (VITAMIN D3) tablet 1,000 Units  1,000 Units Oral Given     02/19/2025 0100 Diclofenac Sodium (VOLTAREN) 1 % topical gel 2 g -- Topical Held Dose     02/19/2025 0508 pantoprazole (PROTONIX) EC tablet 40 mg 40 mg Oral Given     02/19/2025 0508 levothyroxine tablet 25 mcg 25 mcg Oral Given     02/19/2025 1800 metoprolol tartrate (LOPRESSOR) tablet 25 mg -- Oral Held Dose     02/20/2025 0900 metoprolol tartrate (LOPRESSOR) tablet 25 mg -- Oral Held Dose     02/19/2025 0824 rOPINIRole (REQUIP) tablet 0.5 mg 0.5 mg Oral Given     02/19/2025 0328 multi-electrolyte (PLASMALYTE-A/ISOLYTE-S PH 7.4) IV solution 75 mL/hr Intravenous New Bag     02/19/2025 0604 multi-electrolyte (PLASMALYTE-A/ISOLYTE-S PH 7.4) IV solution 100 mL/hr Intravenous Rate/Dose Change     02/19/2025 0821 multivitamin-minerals (CENTRUM) tablet 1 tablet 1 tablet Oral Given     02/19/2025 0843 folic acid 1 mg, thiamine (VITAMIN B1) 100 mg in sodium chloride 0.9 % 100 mL IV piggyback -- Intravenous New Bag            Scheduled Medications:  benzonatate, 200 mg, Oral, TID  cefTRIAXone, 2,000 mg, Intravenous, Q24H  Cholecalciferol, 1,000 Units, Oral, Daily  [Held by provider] Diclofenac Sodium, 2 g, Topical, 4x Daily  folic acid 1 mg, thiamine (VITAMIN B1) 100 mg in sodium chloride 0.9 % 100 mL IV piggyback, , Intravenous, Daily  guaiFENesin, 1,200 mg, Oral, BID  heparin (porcine), 5,000 Units, Subcutaneous, Q8H LITO  HYDROcodone Bit-Homatrop MBr, 5 mL, Oral, Q6H  levothyroxine, 25 mcg, Oral, Early Morning  lidocaine, 1 patch, Topical, Daily  [Held by provider] metoprolol tartrate, 25 mg, Oral, BID  End: 02/20/25 1428  metroNIDAZOLE, 500 mg, Oral, Q8H LITO  multivitamin-minerals, 1 tablet, Oral, Daily  pantoprazole, 40 mg, Oral, Early Morning  rOPINIRole, 0.5 mg, Oral, TID        potassium chloride (Klor-Con M20) CR tablet 40 mEq  Dose: 40 mEq  Freq: Once Route: PO  Start: 02/20/25 0830 End: 02/20/25 0854     metoprolol succinate (TOPROL-XL) 24 hr tablet 25 mg  Dose: 25 mg  Freq: Daily Route:  PO  Start: 02/20/25 1430      calcium gluconate 2 g in sodium chloride 0.9% 100 mL (premix)  Dose: 2 g  Freq: Once Route: IV  Last Dose: 2 g (02/19/25 2229)  Start: 02/19/25 2130 End: 02/19/25 2329  magnesium sulfate 2 g/50 mL IVPB (premix) 2 g  Dose: 2 g  Freq: Once Route: IV  Last Dose: 2 g (02/19/25 2327)  Start: 02/19/25 2130 End: 02/20/25 0127  amiodarone 150 mg in dextrose 5 % 100 mL IV bolus  Dose: 150 mg  Freq: Once Route: IV  Last Dose: 150 mg (02/19/25 1942)  Start: 02/19/25 1930 End: 02/19/25 1952  metoprolol (LOPRESSOR) injection 2.5 mg  Dose: 2.5 mg  Freq: Once Route: IV  Start: 02/20/25 0100 End: 02/20/25 0107  metoprolol (LOPRESSOR) injection 5 mg  Dose: 5 mg  Freq: Once Route: IV  Start: 02/19/25 1930 End: 02/19/25 1922      Continuous IV Infusions:  amiodarone (CORDARONE) 900 mg in dextrose 5 % 500 mL infusion  Rate: 16.7 mL/hr Dose: 0.5 mg/min  Freq: Continuous Route: IV  Last Dose: 0.5 mg/min (02/20/25 0247)  Start: 02/20/25 0205  amiodarone (CORDARONE) 900 mg in dextrose 5 % 500 mL infusion  Rate: 33.3 mL/hr Dose: 1 mg/min  Freq: Continuous Route: IV  Last Dose: Stopped (02/20/25 0247)  Start: 02/19/25 1930 End: 02/20/25 0204    multi-electrolyte, 100 mL/hr, Intravenous, Continuous End: 02/19/25 1327       PRN Meds:  acetaminophen, 650 mg, Oral, Q4H PRN x1 2/19   ALPRAZolam, 0.5 mg, Oral, HS PRN x1 2/19       ED Triage Vitals   Temperature Pulse Respirations Blood Pressure SpO2 Pain Score   02/18/25 2149 02/18/25 2049 02/18/25 2100 02/18/25 2049 02/18/25 2049 02/19/25 0335   97.7 °F (36.5 °C) 69 20 (!) 72/40 97 % No Pain     Weight (last 2 days)       Date/Time Weight    02/20/25 0600 85.4 (188.27)    02/19/25 1500 79.8 (176)    02/19/25 0600 80 (176.37)    02/19/25 0500 80 (176.37)    02/19/25 0346 80 (176.37)            Vital Signs (last 3 days)       Date/Time Temp Pulse Resp BP MAP (mmHg) SpO2 O2 Device Patient Position - Orthostatic VS Nicolas Coma Scale Score CIWA-Ar Total Pain    02/20/25  0915 -- -- -- -- -- -- -- -- 15 -- No Pain    02/20/25 07:23:48 100 °F (37.8 °C) 82 18 119/63 82 93 % -- -- -- -- --    02/20/25 06:32:13 98.3 °F (36.8 °C) 79 -- 104/57 73 96 % -- -- -- -- --    02/20/25 06:27:26 -- 79 -- 104/57 73 95 % -- -- -- -- --    02/20/25 04:18:56 -- 68 -- 102/56 71 96 % -- -- -- 2 --    02/20/25 0218 -- 125 -- 94/63 73 -- -- -- -- -- --    02/20/25 00:58:15 -- 134 -- 103/70 81 91 % -- -- -- -- --    02/19/25 23:56:54 99 °F (37.2 °C) 136 -- 92/60 71 94 % -- -- -- -- --    02/19/25 23:17:59 -- 128 -- 96/63 74 93 % -- -- -- -- --    02/19/25 2300 -- 132 -- 106/69 -- -- -- -- -- 6 --    02/19/25 22:21:03 -- 144 -- 96/61 73 93 % -- -- -- -- --    02/19/25 21:27:36 98.6 °F (37 °C) 86 -- 97/58 71 94 % -- -- -- -- --    02/19/25 2000 -- -- -- -- -- -- -- -- 15 -- No Pain    02/19/25 19:22:39 98.2 °F (36.8 °C) 139 -- 110/64 79 94 % None (Room air) -- -- -- --    02/19/25 19:19:10 -- 168 -- 134/72 -- -- -- -- -- -- --    02/19/25 19:15:45 -- 175 -- -- -- -- -- -- -- -- --    02/19/25 1911 -- -- -- -- -- -- -- -- -- -- Med Not Given for Pain - for MAR use only    02/19/25 1900 -- 177 -- 130/59 -- -- -- -- -- 1 --    02/19/25 18:55:39 -- 188 -- 130/59 83 93 % -- -- -- -- --    02/19/25 18:15:23 101.5 °F (38.6 °C) 98 -- 118/55 76 92 % -- -- -- -- --    02/19/25 1500 -- 94 -- 113/58 -- -- -- -- -- 0 --    02/19/25 1401 99.7 °F (37.6 °C) 94 -- 113/58 79 -- -- -- -- -- --    02/19/25 1200 -- -- -- -- -- -- -- -- -- 0 --    02/19/25 0836 -- 96 22 101/51 72 96 % None (Room air) Lying -- 0 --    02/19/25 0733 -- -- -- -- -- -- -- -- -- -- No Pain    02/19/25 0701 -- -- -- -- -- -- None (Room air) -- 15 -- --    02/19/25 0650 97.4 °F (36.3 °C) 97 -- 95/53 70 94 % -- -- -- -- --    02/19/25 0629 -- -- -- -- -- 94 % None (Room air) -- -- -- --    02/19/25 0600 -- 103 -- 120/55 -- -- -- -- -- 0 --    02/19/25 0500 -- 97 -- 101/57 76 94 % -- -- -- -- --    02/19/25 0335 -- 96 -- 107/55 78 93 % -- -- -- -- No Pain     02/19/25 0228 -- -- -- -- -- -- -- -- 15 -- --    02/19/25 0127 98 °F (36.7 °C) -- -- -- -- -- -- -- -- -- --    02/19/25 0115 -- 100 24 131/61 88 96 % None (Room air) Lying -- -- --    02/19/25 0100 -- 105 22 151/88 110 96 % None (Room air) Lying -- -- --    02/18/25 2350 -- 75 -- -- -- 96 % -- -- -- -- --    02/18/25 2345 -- 75 -- 109/67 -- 97 % -- -- -- -- --    02/18/25 2340 -- 73 -- -- -- 97 % -- -- -- -- --    02/18/25 2335 -- 74 -- -- -- 97 % -- -- -- -- --    02/18/25 2331 -- -- -- 113/68 -- -- -- -- -- -- --    02/18/25 2330 -- 73 -- -- -- 98 % -- -- -- -- --    02/18/25 2325 -- 73 -- -- -- 98 % -- -- -- -- --    02/18/25 2320 -- 80 -- -- -- 95 % -- -- -- -- --    02/18/25 2315 -- 73 -- 108/63 -- 97 % -- -- -- -- --    02/18/25 2310 -- 73 -- -- -- 97 % -- -- -- -- --    02/18/25 2305 -- 74 -- -- -- 97 % -- -- -- -- --    02/18/25 2300 -- 74 20 108/59 -- 97 % None (Room air) Lying 15 -- --    02/18/25 2255 -- 73 -- -- -- 97 % -- -- -- -- --    02/18/25 2250 -- 73 -- -- -- 96 % -- -- -- -- --    02/18/25 2245 -- 72 -- 105/59 -- 98 % -- -- -- -- --    02/18/25 2240 -- 72 -- -- -- 97 % -- -- -- -- --    02/18/25 2235 -- 70 -- -- -- 98 % -- -- -- -- --    02/18/25 2230 -- 71 18 107/59 -- 96 % None (Room air) Lying 15 -- --    02/18/25 2225 -- 70 -- -- -- 97 % -- -- -- -- --    02/18/25 2220 -- 69 -- -- -- 97 % -- -- -- -- --    02/18/25 2215 -- 68 -- 99/59 -- 97 % -- -- -- -- --    02/18/25 2210 -- 69 -- -- -- 97 % -- -- -- -- --    02/18/25 2205 -- 70 -- -- -- 95 % -- -- -- -- --    02/18/25 2200 -- 69 20 99/54 -- 96 % None (Room air) Lying 15 -- --    02/18/25 2155 -- 70 -- -- -- 97 % -- -- -- -- --    02/18/25 2150 -- 67 -- -- -- 96 % -- -- -- -- --    02/18/25 2149 97.7 °F (36.5 °C) -- -- -- -- -- -- -- -- -- --    02/18/25 2148 -- 68 18 96/54 -- 97 % None (Room air) Lying -- -- --    02/18/25 2145 -- 67 20 98/57 -- 95 % None (Room air) Lying 15 -- --    02/18/25 2140 -- 69 -- -- -- 97 % -- -- -- -- --     02/18/25 2135 -- 67 -- -- -- 96 % -- -- -- -- --    02/18/25 2130 -- 67 18 100/55 -- 97 % None (Room air) Lying 15 -- --    02/18/25 2125 -- 67 -- -- -- 95 % -- -- -- -- --    02/18/25 2120 -- 63 -- -- -- -- -- -- -- -- --    02/18/25 2115 -- 64 20 94/54 -- 98 % None (Room air) Lying 15 -- --    02/18/25 2110 -- 64 -- -- -- -- -- -- -- -- --    02/18/25 2106 -- -- -- 95/51 -- -- -- -- -- -- --    02/18/25 2105 -- 66 -- -- -- -- -- -- -- -- --    02/18/25 2103 -- -- -- -- -- 98 % -- -- -- -- --    02/18/25 21:00:44 -- 63 20 93/55 -- 98 % None (Room air) Sitting 15 -- --    02/18/25 2100 -- 63 -- 93/55 -- 98 % -- -- -- -- --    02/18/25 2049 -- 69 -- 72/40 52 97 % None (Room air) Sitting -- -- --           CIWA-Ar Score       Row Name 02/20/25 04:18:56 02/19/25 2300 02/19/25 1900       CIWA-Ar    BP -- 106/69 130/59    Pulse -- 132 177    Nausea and Vomiting 0 0 0    Tactile Disturbances 0 0 0    Tremor 0 0 0    Auditory Disturbances 0 0 0    Paroxysmal Sweats 2 3 1    Visual Disturbances 0 0 0    Anxiety 0 3 0    Headache, Fullness in Head 0 0 0    Agitation 0 0 0    Orientation and Clouding of Sensorium 0 0 0    CIWA-Ar Total 2 6 --      Row Name 02/19/25 1500 02/19/25 1200 02/19/25 0836       CIWA-Ar    Nausea and Vomiting 0 0 0    Tactile Disturbances 0 0 0    Tremor 0 0 0    Auditory Disturbances 0 0 0    Paroxysmal Sweats 0 0 0    Visual Disturbances 0 0 0    Anxiety 0 0 0    Headache, Fullness in Head 0 0 0    Agitation 0 0 0    Orientation and Clouding of Sensorium 0 0 0    CIWA-Ar Total 0 0 0      Row Name 02/19/25 0600             CIWA-Ar    /55      Pulse 103      Nausea and Vomiting 0      Tactile Disturbances 0      Tremor 0      Auditory Disturbances 0      Paroxysmal Sweats 0      Visual Disturbances 0      Anxiety 0      Headache, Fullness in Head 0      Agitation 0      Orientation and Clouding of Sensorium 0      CIWA-Ar Total 0                      Pertinent Labs/Diagnostic Test Results:    Radiology:  CT abdomen pelvis w contrast   Final Interpretation by Tika Trimble MD (02/20 0903)   Overdistended gallbladder with mild wall thickening and pericholecystic fluid, likely reflecting cholecystitis.   Lobulated hypodense lesion in the adjacent liver, not fully characterized but highly concerning for a pericholecystic abscess.            Workstation performed: [id number]         US right upper quadrant   Final Interpretation by Arias Sharp MD (02/19 1446)      Gallbladder sludge with wall thickening but without sonographic Sage sign. Some gravel might be present. Cholecystitis is not excluded in the proper clinical context. X-ray of the abdomen with attention to the right upper quadrant is suggested to    exclude any gas which is less likely.      No evidence of common bile duct dilatation.      Limited study due to bowel gas.      This was discussed with Dr Sargent  at 2:45 p.m.      Workstation performed: [id number]         TRAUMA - CT head wo contrast   Final Interpretation by Kacy Vega MD (02/18 2150)      No acute intracranial abnormality.      Air-fluid level with frothy secretions in the right maxillary sinus may represent acute sinusitis in the appropriate setting.         I personally discussed this study with MAXIME KURTZ on 2/18/2025 9:49 PM.                        Workstation performed: [id number]         TRAUMA - CT spine cervical wo contrast   Final Interpretation by Kacy Vega MD (02/18 2151)      No cervical spine fracture or traumatic malalignment.         I personally discussed this study with MAXIME KURTZ on 2/18/2025 9:49 PM.            Workstation performed: [id number]         XR Trauma chest portable   Final Interpretation by Vero Cannon MD (02/18 2140)      No acute pulmonary pathology.      No obvious displaced fractures within limitation of supine AP chest technique.                  Workstation performed: [id number]            Cardiology:   2/18 ECG- ED read   Interpretation: normal    Rate:     ECG rate:  66     ECG rate assessment: normal    Rhythm:     Rhythm: sinus rhythm    Ectopy:     Ectopy: none     ECG 12 lead   Final Result by Mello Ness MD (02/20 0632)   Normal sinus rhythm   Nonspecific ST and T wave abnormality   Abnormal ECG   When compared with ECG of 18-Feb-2025 21:05,   No significant change was found   Confirmed by Mello Ness ([id number]) on 2/20/2025 6:32:02 AM      Echo complete w/ contrast if indicated   Final Result by Nelia Burrell MD (02/19 8244)        Left Ventricle: Left ventricular cavity size is normal. Wall thickness    is normal. The left ventricular ejection fraction is 67% by biplane    measurement. Systolic function is hyperdynamic. Wall motion cannot be    accurately assessed. Diastolic function is normal.     Right Ventricle: Right ventricular cavity size is mildly dilated.     Aortic Valve: There is aortic valve sclerosis.      Technically difficult study.         ECG 12 lead   Final Result by Mello Ness MD (02/19 1621)   Normal sinus rhythm   Nonspecific T wave abnormality   Abnormal ECG   No previous ECGs available   Confirmed by Mello Ness ([id number]) on 2/19/2025 4:21:21 PM        GI:  No orders to display       Results from last 7 days   Lab Units 02/18/25  2149   SARS-COV-2  Negative     Results from last 7 days   Lab Units 02/19/25  0836 02/18/25  2112   WBC Thousand/uL 24.98* 18.07*   HEMOGLOBIN g/dL 12.4 14.4   HEMATOCRIT % 35.9* 42.0   PLATELETS Thousands/uL 209 249   BANDS PCT % 8  --          Results from last 7 days   Lab Units 02/20/25  0629 02/19/25  1928 02/19/25  0656 02/19/25  0542 02/18/25  2112   SODIUM mmol/L 132* 133* 131* 130* 133*   POTASSIUM mmol/L 3.3* 3.8 3.5 5.4* 3.9   CHLORIDE mmol/L 99 97 99 98 98   CO2 mmol/L 25 23 23 24 25   ANION GAP mmol/L 8 13 9 8 10   BUN mg/dL 18 19 17 17 18   CREATININE mg/dL 1.02 1.20 1.01 1.07 1.31*   EGFR ml/min/1.73sq m 69 57 70  65 51   CALCIUM mg/dL 8.5 8.8 8.0* 7.9* 8.9   CALCIUM, IONIZED mmol/L  --  1.10*  --   --   --    MAGNESIUM mg/dL  --  1.7*  --   --   --      Results from last 7 days   Lab Units 02/20/25  0629 02/19/25  0542 02/18/25 2112   AST U/L 52* 46* 26   ALT U/L 32 23 21   ALK PHOS U/L 83 84 94   TOTAL PROTEIN g/dL 5.5* 6.0* 7.1   ALBUMIN g/dL 2.7* 2.9* 3.6   TOTAL BILIRUBIN mg/dL 1.04* 2.58* 3.52*   BILIRUBIN DIRECT mg/dL  --  0.54*  --      Results from last 7 days   Lab Units 02/19/25  1922   POC GLUCOSE mg/dl 82     Results from last 7 days   Lab Units 02/20/25  0629 02/19/25  1928 02/19/25  0656 02/19/25  0542 02/18/25 2112   GLUCOSE RANDOM mg/dL 127 89 87 78 129                     Results from last 7 days   Lab Units 02/20/25  0114 02/19/25  2308 02/19/25  2105 02/19/25  0109 02/18/25  2320 02/18/25 2112   HS TNI 0HR ng/L  --   --  98*  --   --  4   HS TNI 2HR ng/L  --  101*  --   --  <2  --    HSTNI D2 ng/L  --  3  --   --  <-2  --    HS TNI 4HR ng/L 112*  --   --  4  --   --    HSTNI D4 ng/L 14  --   --  0  --   --          Results from last 7 days   Lab Units 02/19/25  0542   PROTIME seconds 17.5*   INR  1.36*     Results from last 7 days   Lab Units 02/19/25  1928   TSH 3RD GENERATON uIU/mL 1.066     Results from last 7 days   Lab Units 02/19/25  0506 02/18/25 2138   PROCALCITONIN ng/ml 4.64* 1.21*     Results from last 7 days   Lab Units 02/19/25  0656 02/19/25  0506 02/18/25  2359 02/18/25  2138   LACTIC ACID mmol/L 2.2* 3.1* 2.1* 2.5*             Results from last 7 days   Lab Units 02/19/25  0751   BNP pg/mL 463*                   Results from last 7 days   Lab Units 02/18/25  2358   CLARITY UA  Clear   COLOR UA  Yellow   SPEC GRAV UA  1.016   PH UA  6.5   GLUCOSE UA mg/dl Negative   KETONES UA mg/dl Negative   BLOOD UA  Negative   PROTEIN UA mg/dl Negative   NITRITE UA  Negative   BILIRUBIN UA  Negative   UROBILINOGEN UA (BE) mg/dl 12.0*   LEUKOCYTES UA  Negative     Results from last 7 days   Lab Units  02/19/25 0105 02/18/25 2149   STREP PNEUMONIAE ANTIGEN, URINE  Negative  --    LEGIONELLA URINARY ANTIGEN  Negative  --    INFLUENZA A PCR   --  Negative   INFLUENZA B PCR   --  Negative   RSV PCR   --  Negative                             Results from last 7 days   Lab Units 02/18/25 2145 02/18/25 2138   BLOOD CULTURE  Escherichia coli* Gram Negative Triston Enteric Like*   GRAM STAIN RESULT  Gram negative rods* Gram negative rods*                   Past Medical History:   Diagnosis Date    GERD (gastroesophageal reflux disease)     Right hip pain 4/22/2021     Present on Admission:   Essential hypertension   Anxiety   GERD (gastroesophageal reflux disease)   RLS (restless legs syndrome)   Hypothyroidism due to acquired atrophy of thyroid   Right hip pain      Admitting Diagnosis: Syncope [R55]  Pneumonia [J18.9]  RANDAL (acute kidney injury) (HCC) [N17.9]  Encounter for screening involving social determinants of health (SDoH) [Z13.9]  Age/Sex: 80 y.o. male    Network Utilization Review Department  ATTENTION: Please call with any questions or concerns to [phone] and carefully listen to the prompts so that you are directed to the right person. All voicemails are confidential.   For Discharge needs, contact Care Management DC Support Team at [phone] opt. 2  Send all requests for admission clinical reviews, approved or denied determinations and any other requests to dedicated fax number below belonging to the campus where the patient is receiving treatment. List of dedicated fax numbers for the Facilities:  FACILITY NAME UR FAX NUMBER   ADMISSION DENIALS (Administrative/Medical Necessity) [phone]   DISCHARGE SUPPORT TEAM (NETWORK) [phone]   PARENT CHILD HEALTH (Maternity/NICU/Pediatrics) [phone]   [facility] [phone]   [facility] [phone]   [facility] [phone]   [facility]  [phone]   [facility] [phone]   [facility] [phone]   [facility] [phone]   [facility] [phone]   [facility] [phone]   [facility] [phone]   [facility] [phone]   [facility] [phone]

## 2025-02-19 NOTE — ASSESSMENT & PLAN NOTE
Reports productive cough over the past two weeks with significant worsening over the last two days with fatigue and poor PO intake with known sick contacts  Meet Sepsis Criteria on admission (hypotension, tachypnea, leukocytosis, lactic acidosis)  CT head noted right maxillary sinus may represent acute sinusitis  CXR negative, however increased rales in RLL on exam. Suspect CXR did not show RLL consolidation due to dehydration.  Recent Labs     02/18/25  2138   PROCALCITONI 1.21*       DRIP Score: 1  S/p 1LNSS IVF bolus and IV ceftriaxone in the ED        Plan:  Continue ceftriaxone q24h  Continue 30cc/kg IVF- ordered 1.5L bolus  F/u blood cultures  F/u Urine cultures  F/u lactic acid in the AM  F/u Procal AM  F/u Urine strep and legionella  F/u Sputum cultures  Mucinex 1200mg BID   Tesaloon Perles 100mg TID prn for cough  Incentive Spirometry  Continue to monitor respiratory

## 2025-02-19 NOTE — HOSPITAL COURSE
Anxiety hypertension, hypothyroidism syncopal episode      Cough x 2 weeks improving or worsening    Before syncope, denies symptoms    Hypotensive 72/40,   leukocytosis 18,   lactic acidosis 2.5 -> 3.1 -> 2.2   elevated Pro-Gaetano 1.21 -> 4.64     30cc/kg -> 1.6 L in total - > turned off heard rales 75 cc -> 100 cc    BNP is 463 - echo was ordered.    hyponatremia,   RANDAL 1.31 - > 1.01       Hyper direct bilirubinemia, sepsis, hepatic dysfunction,     Negative viral panel, CT head positive for right maxillary acute sinusitis    Received ceftriaxone and 1 L bolus    XR - no acute chest       2/21 choletube, follow up with culture. Tube placed draining pericholecystic fluid

## 2025-02-19 NOTE — ASSESSMENT & PLAN NOTE
Presented to the ED after syncopal episode x2 while playing pool. Hit head on fall.  Trauma work up negative, including negative CT head  Meet Sepsis criteria with tachypnea, hypotension, leukocytosis, and lactic acidosis with suspected PNA etiology. Initial BP 72/40, responded well to IVF.  Reports productive cough over the past two weeks with significant worsening over the last two days with fatigue and poor PO intake with known sick contacts.  EKG NSR, rate 66  Suspect syncopal episodes 2/2 sepsis 2/2 PNA    Plan:  Continue IVF as listed above  Monitor on tele overnight, consider discontinuing in the morning

## 2025-02-19 NOTE — ASSESSMENT & PLAN NOTE
Meet Sepsis criteria with tachypnea, hypotension, leukocytosis, and lactic acidosis with suspected PNA etiology.  Reports productive cough over the past two weeks with significant worsening over the last two days with fatigue and poor PO intake with known sick contacts.  Recent Labs     02/18/25  2112   WBC 18.07*     Recent Labs     02/18/25  2138   PROCALCITONI 1.21*     Recent Labs     02/18/25  2138 02/18/25  2359   LACTICACID 2.5* 2.1*     CXR negative, however increased rales in RLL on exam. Suspect CXR did not show RLL consolidation due to dehydration.  CT head showed possible acute right maxillary sinusitis   DRIP Score: 1  S/p 1LNSS IVF bolus and IV ceftriaxone in the ED    Plan:  Continue ceftriaxone q24h  Continue 30cc/kg IVF- ordered 1.5L bolus  F/u blood cultures  F/u Urine cultures  F/u lactic acid in the AM  F/u Procal AM  F/u Urine strep and legionella  F/u Sputum cultures  Mucinex 1200mg BID   Tesaloon Perles 100mg TID prn for cough  Incentive Spirometry  Continue to monitor respiratory

## 2025-02-19 NOTE — ED ATTENDING ATTESTATION
2/18/2025  I, Mello Gonzales MD, saw and evaluated the patient. I have discussed the patient with the resident/non-physician practitioner and agree with the resident's/non-physician practitioner's findings, Plan of Care, and MDM as documented in the resident's/non-physician practitioner's note, except where noted. All available labs and Radiology studies were reviewed.  I was present for key portions of any procedure(s) performed by the resident/non-physician practitioner and I was immediately available to provide assistance.       At this point I agree with the current assessment done in the Emergency Department.  I have conducted an independent evaluation of this patient a history and physical is as follows: Total syncopal events today with initial hypotension in the ER.  Has had cough, worsening over the past several days.  Leukocytosis, elevated lactic acid, elevated procalcitonin.  Productive cough, right lower lobe crackles on examination, will treat for community-acquired pneumonia.  He is on room air.  He meets severe sepsis criteria, IV antibiotics, admission.  Blood pressure improved with resuscitation in the ER, no indication for pressors, only 1 blood pressure less than 90, no indication for 30 cc/kg of IV fluids.    Results Reviewed       Procedure Component Value Units Date/Time    HS Troponin I 4hr [[id number]]     Lab Status: No result Specimen: Blood     FLU/RSV/COVID - if FLU/RSV clinically relevant (2hr TAT) [[id number]]  (Normal) Collected: 02/18/25 2149    Lab Status: Final result Specimen: Nares from Nose Updated: 02/18/25 2300     SARS-CoV-2 Negative     INFLUENZA A PCR Negative     INFLUENZA B PCR Negative     RSV PCR Negative    Narrative:      This test has been performed using the CoV-2/Flu/RSV plus assay on the [employer] GeneXpert platform. This test has been validated by the  and verified by the performing laboratory.     This test is designed to amplify and detect the  following: nucleocapsid (N), envelope (E), and RNA-dependent RNA polymerase (RdRP) genes of the SARS-CoV-2 genome; matrix (M), basic polymerase (PB2), and acidic protein (PA) segments of the influenza A genome; matrix (M) and non-structural protein (NS) segments of the influenza B genome, and the nucleocapsid genes of RSV A and RSV B.     Positive results are indicative of the presence of Flu A, Flu B, RSV, and/or SARS-CoV-2 RNA. Positive results for SARS-CoV-2 or suspected novel influenza should be reported to state, local, or federal health departments according to local reporting requirements.      All results should be assessed in conjunction with clinical presentation and other laboratory markers for clinical management.     FOR PEDIATRIC PATIENTS - copy/paste COVID Guidelines URL to browser: [email or web address]       Procalcitonin [[id number]]  (Abnormal) Collected: 02/18/25 2138    Lab Status: Final result Specimen: Blood from Arm, Right Updated: 02/18/25 2238     Procalcitonin 1.21 ng/ml     Lactic acid, plasma (w/reflex if result > 2.0) [[id number]]  (Abnormal) Collected: 02/18/25 2138    Lab Status: Final result Specimen: Blood from Arm, Right Updated: 02/18/25 2208     LACTIC ACID 2.5 mmol/L     Narrative:      Result may be elevated if tourniquet was used during collection.    Lactic acid 2 Hours [[id number]]     Lab Status: No result Specimen: Blood     RBC Morphology Reflex Test [[id number]] Collected: 02/18/25 2112    Lab Status: Final result Specimen: Blood from Arm, Right Updated: 02/18/25 2201    CBC and differential [[id number]]  (Abnormal) Collected: 02/18/25 2112    Lab Status: Final result Specimen: Blood from Arm, Right Updated: 02/18/25 2159     WBC 18.07 Thousand/uL      RBC 4.70 Million/uL      Hemoglobin 14.4 g/dL      Hematocrit 42.0 %      MCV 89 fL      MCH 30.6 pg      MCHC 34.3 g/dL      RDW 12.6 %      MPV 8.9 fL      Platelets 249  Thousands/uL     Narrative:      This is an appended report.  These results have been appended to a previously verified report.    Manual Differential(PHLEBS Do Not Order) [[id number]]  (Abnormal) Collected: 02/18/25 2112    Lab Status: Final result Specimen: Blood from Arm, Right Updated: 02/18/25 2159     Segmented % 98 %      Lymphocytes % 2 %      Monocytes % 0 %      Eosinophils % 0 %      Basophils % 0 %      Absolute Neutrophils 17.71 Thousand/uL      Absolute Lymphocytes 0.36 Thousand/uL      Absolute Monocytes 0.00 Thousand/uL      Absolute Eosinophils 0.00 Thousand/uL      Absolute Basophils 0.00 Thousand/uL      Total Counted --     RBC Morphology Present     Platelet Estimate Adequate     Large Platelet Present    Blood culture #1 [[id number]] Collected: 02/18/25 2138    Lab Status: In process Specimen: Blood from Arm, Right Updated: 02/18/25 2149    Blood culture #2 [[id number]] Collected: 02/18/25 2145    Lab Status: In process Specimen: Blood from Arm, Right Updated: 02/18/25 2149    HS Troponin 0hr (reflex protocol) [[id number]]  (Normal) Collected: 02/18/25 2112    Lab Status: Final result Specimen: Blood from Arm, Right Updated: 02/18/25 2149     hs TnI 0hr 4 ng/L     HS Troponin I 2hr [[id number]]     Lab Status: No result Specimen: Blood     Comprehensive metabolic panel [[id number]]  (Abnormal) Collected: 02/18/25 2112    Lab Status: Final result Specimen: Blood from Arm, Right Updated: 02/18/25 2143     Sodium 133 mmol/L      Potassium 3.9 mmol/L      Chloride 98 mmol/L      CO2 25 mmol/L      ANION GAP 10 mmol/L      BUN 18 mg/dL      Creatinine 1.31 mg/dL      Glucose 129 mg/dL      Calcium 8.9 mg/dL      AST 26 U/L      ALT 21 U/L      Alkaline Phosphatase 94 U/L      Total Protein 7.1 g/dL      Albumin 3.6 g/dL      Total Bilirubin 3.52 mg/dL      eGFR 51 ml/min/1.73sq m     Narrative:      National Kid[employer] Foundation guidelines for Chronic Kidney Disease (CKD):     Stage 1 with normal or  high GFR (GFR > 90 mL/min/1.73 square meters)    Stage 2 Mild CKD (GFR = 60-89 mL/min/1.73 square meters)    Stage 3A Moderate CKD (GFR = 45-59 mL/min/1.73 square meters)    Stage 3B Moderate CKD (GFR = 30-44 mL/min/1.73 square meters)    Stage 4 Severe CKD (GFR = 15-29 mL/min/1.73 square meters)    Stage 5 End Stage CKD (GFR <15 mL/min/1.73 square meters)  Note: GFR calculation is accurate only with a steady state creatinine    UA w Reflex to Microscopic w Reflex to Culture [[id number]]     Lab Status: No result Specimen: Urine           TRAUMA - CT head wo contrast   Final Result by Kacy Vega MD (02/18 2150)      No acute intracranial abnormality.      Air-fluid level with frothy secretions in the right maxillary sinus may represent acute sinusitis in the appropriate setting.         I personally discussed this study with MAXIME KURTZ on 2/18/2025 9:49 PM.                        Workstation performed: [id number]         TRAUMA - CT spine cervical wo contrast   Final Result by Kacy Vega MD (02/18 2151)      No cervical spine fracture or traumatic malalignment.         I personally discussed this study with MAXIME KURTZ on 2/18/2025 9:49 PM.            Workstation performed: [id number]         XR Trauma chest portable   Final Result by Vero Cannon MD (02/18 2140)      No acute pulmonary pathology.      No obvious displaced fractures within limitation of supine AP chest technique.                  Workstation performed: [id number]           Critical Care Time Statement: Upon my evaluation, this patient had a high probability of imminent or life-threatening deterioration due to fall, headstrike, aspirin use, trauma C.  Severe Sepsis, which required my direct attention, intervention, and personal management.  I spent a total of 60 minutes directly providing critical care services, including evaluating for the presence of life-threatening injuries or illnesses, management of organ system  failure(s) , complex medical decision making (to support/prevent further life-threatening deterioration)., and interpretation of hemodynamic data. This time is exclusive of procedures, teaching, treating other patients, family meetings, and any prior time recorded by providers other than myself.        ED Course         Critical Care Time  Procedures

## 2025-02-19 NOTE — QUICK NOTE
Reviewed lab work:  Recent Labs     02/18/25 2138 02/18/25  2359 02/19/25  0506   LACTICACID 2.5* 2.1* 3.1*     Recent Labs     02/18/25 2112 02/19/25  0542   CREATININE 1.31* 1.07   EGFR 51 65     Estimated Creatinine Clearance: 56.8 mL/min (by C-G formula based on SCr of 1.07 mg/dL).      Recent Labs     02/18/25 2112 02/19/25  0542   AST 26 46*   ALT 21 23   ALKPHOS 94 84   TBILI 3.52* 2.58*   BILIDIR  --  0.54*     Increased IVF from 75cc/hr to 100cc/hr due to worsening lactic acid. Consider 500cc/bolus if patient is still dry on exam. Repeat BMP due to hemolyzed potassium level. Ordered RUQ US due to elevated direct bilirubin with elevated INR and 2:1 AST: ALT. History of drinking 9 beers per week. Added CIWA protocol with folate, thiamine, and MV supplementation

## 2025-02-20 PROBLEM — K81.9 CHOLECYSTITIS: Status: ACTIVE | Noted: 2025-02-20

## 2025-02-20 PROBLEM — I48.91 A-FIB (HCC): Status: ACTIVE | Noted: 2025-02-20

## 2025-02-20 LAB
4HR DELTA HS TROPONIN: 14 NG/L
ALBUMIN SERPL BCG-MCNC: 2.7 G/DL (ref 3.5–5)
ALBUMIN SERPL BCG-MCNC: 2.8 G/DL (ref 3.5–5)
ALP SERPL-CCNC: 83 U/L (ref 34–104)
ALP SERPL-CCNC: 83 U/L (ref 34–104)
ALT SERPL W P-5'-P-CCNC: 31 U/L (ref 7–52)
ALT SERPL W P-5'-P-CCNC: 32 U/L (ref 7–52)
ANION GAP SERPL CALCULATED.3IONS-SCNC: 7 MMOL/L (ref 4–13)
ANION GAP SERPL CALCULATED.3IONS-SCNC: 8 MMOL/L (ref 4–13)
AST SERPL W P-5'-P-CCNC: 42 U/L (ref 13–39)
AST SERPL W P-5'-P-CCNC: 52 U/L (ref 13–39)
ATRIAL RATE: 73 BPM
BASOPHILS # BLD AUTO: 0.07 THOUSANDS/ΜL (ref 0–0.1)
BASOPHILS NFR BLD AUTO: 0 % (ref 0–1)
BILIRUB SERPL-MCNC: 0.85 MG/DL (ref 0.2–1)
BILIRUB SERPL-MCNC: 1.04 MG/DL (ref 0.2–1)
BUN SERPL-MCNC: 18 MG/DL (ref 5–25)
BUN SERPL-MCNC: 22 MG/DL (ref 5–25)
CALCIUM ALBUM COR SERPL-MCNC: 9.5 MG/DL (ref 8.3–10.1)
CALCIUM ALBUM COR SERPL-MCNC: 9.5 MG/DL (ref 8.3–10.1)
CALCIUM SERPL-MCNC: 8.5 MG/DL (ref 8.4–10.2)
CALCIUM SERPL-MCNC: 8.5 MG/DL (ref 8.4–10.2)
CARDIAC TROPONIN I PNL SERPL HS: 112 NG/L (ref ?–50)
CHLORIDE SERPL-SCNC: 99 MMOL/L (ref 96–108)
CHLORIDE SERPL-SCNC: 99 MMOL/L (ref 96–108)
CO2 SERPL-SCNC: 25 MMOL/L (ref 21–32)
CO2 SERPL-SCNC: 26 MMOL/L (ref 21–32)
CREAT SERPL-MCNC: 1.02 MG/DL (ref 0.6–1.3)
CREAT SERPL-MCNC: 1.08 MG/DL (ref 0.6–1.3)
EOSINOPHIL # BLD AUTO: 0.01 THOUSAND/ΜL (ref 0–0.61)
EOSINOPHIL NFR BLD AUTO: 0 % (ref 0–6)
ERYTHROCYTE [DISTWIDTH] IN BLOOD BY AUTOMATED COUNT: 13.2 % (ref 11.6–15.1)
GFR SERPL CREATININE-BSD FRML MDRD: 64 ML/MIN/1.73SQ M
GFR SERPL CREATININE-BSD FRML MDRD: 69 ML/MIN/1.73SQ M
GLUCOSE SERPL-MCNC: 127 MG/DL (ref 65–140)
GLUCOSE SERPL-MCNC: 136 MG/DL (ref 65–140)
HCT VFR BLD AUTO: 36.5 % (ref 36.5–49.3)
HGB BLD-MCNC: 12.5 G/DL (ref 12–17)
IMM GRANULOCYTES # BLD AUTO: 0.15 THOUSAND/UL (ref 0–0.2)
IMM GRANULOCYTES NFR BLD AUTO: 1 % (ref 0–2)
LYMPHOCYTES # BLD AUTO: 0.81 THOUSANDS/ΜL (ref 0.6–4.47)
LYMPHOCYTES NFR BLD AUTO: 5 % (ref 14–44)
MCH RBC QN AUTO: 30.5 PG (ref 26.8–34.3)
MCHC RBC AUTO-ENTMCNC: 34.2 G/DL (ref 31.4–37.4)
MCV RBC AUTO: 89 FL (ref 82–98)
MONOCYTES # BLD AUTO: 0.5 THOUSAND/ΜL (ref 0.17–1.22)
MONOCYTES NFR BLD AUTO: 3 % (ref 4–12)
NEUTROPHILS # BLD AUTO: 15.25 THOUSANDS/ΜL (ref 1.85–7.62)
NEUTS SEG NFR BLD AUTO: 91 % (ref 43–75)
NRBC BLD AUTO-RTO: 0 /100 WBCS
P AXIS: 15 DEGREES
PLATELET # BLD AUTO: 142 THOUSANDS/UL (ref 149–390)
PLATELET BLD QL SMEAR: ABNORMAL
PMV BLD AUTO: 10 FL (ref 8.9–12.7)
POTASSIUM SERPL-SCNC: 3.3 MMOL/L (ref 3.5–5.3)
POTASSIUM SERPL-SCNC: 3.9 MMOL/L (ref 3.5–5.3)
PR INTERVAL: 134 MS
PROT SERPL-MCNC: 5.5 G/DL (ref 6.4–8.4)
PROT SERPL-MCNC: 5.8 G/DL (ref 6.4–8.4)
QRS AXIS: 40 DEGREES
QRSD INTERVAL: 94 MS
QT INTERVAL: 416 MS
QTC INTERVAL: 459 MS
RBC # BLD AUTO: 4.1 MILLION/UL (ref 3.88–5.62)
RBC MORPH BLD: NORMAL
SODIUM SERPL-SCNC: 132 MMOL/L (ref 135–147)
SODIUM SERPL-SCNC: 132 MMOL/L (ref 135–147)
T WAVE AXIS: 32 DEGREES
VENTRICULAR RATE: 73 BPM
WBC # BLD AUTO: 16.79 THOUSAND/UL (ref 4.31–10.16)

## 2025-02-20 PROCEDURE — 99222 1ST HOSP IP/OBS MODERATE 55: CPT | Performed by: SURGERY

## 2025-02-20 PROCEDURE — 84484 ASSAY OF TROPONIN QUANT: CPT

## 2025-02-20 PROCEDURE — 97163 PT EVAL HIGH COMPLEX 45 MIN: CPT

## 2025-02-20 PROCEDURE — 99232 SBSQ HOSP IP/OBS MODERATE 35: CPT | Performed by: HOSPITALIST

## 2025-02-20 PROCEDURE — 93005 ELECTROCARDIOGRAM TRACING: CPT

## 2025-02-20 PROCEDURE — 85025 COMPLETE CBC W/AUTO DIFF WBC: CPT

## 2025-02-20 PROCEDURE — 80053 COMPREHEN METABOLIC PANEL: CPT

## 2025-02-20 PROCEDURE — 97167 OT EVAL HIGH COMPLEX 60 MIN: CPT

## 2025-02-20 PROCEDURE — 93010 ELECTROCARDIOGRAM REPORT: CPT | Performed by: INTERNAL MEDICINE

## 2025-02-20 RX ORDER — HYDROCODONE BITARTRATE AND HOMATROPINE METHYLBROMIDE ORAL SOLUTION 5; 1.5 MG/5ML; MG/5ML
5 LIQUID ORAL EVERY 4 HOURS PRN
Refills: 0 | Status: DISCONTINUED | OUTPATIENT
Start: 2025-02-20 | End: 2025-02-23 | Stop reason: HOSPADM

## 2025-02-20 RX ORDER — METOPROLOL SUCCINATE 25 MG/1
25 TABLET, EXTENDED RELEASE ORAL DAILY
Status: DISCONTINUED | OUTPATIENT
Start: 2025-02-20 | End: 2025-02-21

## 2025-02-20 RX ORDER — POTASSIUM CHLORIDE 1500 MG/1
40 TABLET, EXTENDED RELEASE ORAL ONCE
Status: COMPLETED | OUTPATIENT
Start: 2025-02-20 | End: 2025-02-20

## 2025-02-20 RX ORDER — HYDROCODONE BITARTRATE AND HOMATROPINE METHYLBROMIDE ORAL SOLUTION 5; 1.5 MG/5ML; MG/5ML
5 LIQUID ORAL EVERY 6 HOURS
Refills: 0 | Status: DISCONTINUED | OUTPATIENT
Start: 2025-02-20 | End: 2025-02-20

## 2025-02-20 RX ORDER — METOPROLOL TARTRATE 1 MG/ML
2.5 INJECTION, SOLUTION INTRAVENOUS ONCE
Status: COMPLETED | OUTPATIENT
Start: 2025-02-20 | End: 2025-02-20

## 2025-02-20 RX ADMIN — ROPINIROLE 0.5 MG: 0.25 TABLET, FILM COATED ORAL at 08:54

## 2025-02-20 RX ADMIN — Medication 1000 UNITS: at 08:54

## 2025-02-20 RX ADMIN — METRONIDAZOLE 500 MG: 500 TABLET ORAL at 21:15

## 2025-02-20 RX ADMIN — BENZONATATE 200 MG: 100 CAPSULE ORAL at 16:19

## 2025-02-20 RX ADMIN — LEVOTHYROXINE SODIUM 25 MCG: 0.03 TABLET ORAL at 06:37

## 2025-02-20 RX ADMIN — HYDROCODONE BITARTRATE AND HOMATROPINE METHYLBROMIDE 5 ML: 1.5; 5 SOLUTION ORAL at 06:53

## 2025-02-20 RX ADMIN — CEFTRIAXONE SODIUM 2000 MG: 10 INJECTION, POWDER, FOR SOLUTION INTRAVENOUS at 17:36

## 2025-02-20 RX ADMIN — METRONIDAZOLE 500 MG: 500 TABLET ORAL at 06:37

## 2025-02-20 RX ADMIN — PANTOPRAZOLE SODIUM 40 MG: 40 TABLET, DELAYED RELEASE ORAL at 06:37

## 2025-02-20 RX ADMIN — GUAIFENESIN 1200 MG: 600 TABLET ORAL at 17:36

## 2025-02-20 RX ADMIN — ROPINIROLE 0.5 MG: 0.25 TABLET, FILM COATED ORAL at 21:15

## 2025-02-20 RX ADMIN — METOPROLOL TARTRATE 2.5 MG: 1 INJECTION, SOLUTION INTRAVENOUS at 01:07

## 2025-02-20 RX ADMIN — LIDOCAINE 1 PATCH: 50 PATCH CUTANEOUS at 08:54

## 2025-02-20 RX ADMIN — HEPARIN SODIUM 5000 UNITS: 5000 INJECTION INTRAVENOUS; SUBCUTANEOUS at 06:37

## 2025-02-20 RX ADMIN — METOPROLOL SUCCINATE ER TABLETS 25 MG: 25 TABLET, FILM COATED, EXTENDED RELEASE ORAL at 16:19

## 2025-02-20 RX ADMIN — BENZONATATE 200 MG: 100 CAPSULE ORAL at 08:54

## 2025-02-20 RX ADMIN — BENZONATATE 200 MG: 100 CAPSULE ORAL at 21:15

## 2025-02-20 RX ADMIN — THIAMINE HYDROCHLORIDE: 100 INJECTION, SOLUTION INTRAMUSCULAR; INTRAVENOUS at 09:00

## 2025-02-20 RX ADMIN — ROPINIROLE 0.5 MG: 0.25 TABLET, FILM COATED ORAL at 16:19

## 2025-02-20 RX ADMIN — GUAIFENESIN 1200 MG: 600 TABLET ORAL at 08:54

## 2025-02-20 RX ADMIN — POTASSIUM CHLORIDE 40 MEQ: 1500 TABLET, EXTENDED RELEASE ORAL at 08:54

## 2025-02-20 RX ADMIN — MULTIPLE VITAMINS W/ MINERALS TAB 1 TABLET: TAB ORAL at 08:54

## 2025-02-20 RX ADMIN — HEPARIN SODIUM 5000 UNITS: 5000 INJECTION INTRAVENOUS; SUBCUTANEOUS at 13:12

## 2025-02-20 RX ADMIN — HEPARIN SODIUM 5000 UNITS: 5000 INJECTION INTRAVENOUS; SUBCUTANEOUS at 21:15

## 2025-02-20 RX ADMIN — METRONIDAZOLE 500 MG: 500 TABLET ORAL at 13:12

## 2025-02-20 RX ADMIN — HYDROCODONE BITARTRATE AND HOMATROPINE METHYLBROMIDE 5 ML: 1.5; 5 SOLUTION ORAL at 13:12

## 2025-02-20 RX ADMIN — AMIODARONE HYDROCHLORIDE 0.5 MG/MIN: 50 INJECTION, SOLUTION INTRAVENOUS at 18:17

## 2025-02-20 NOTE — PLAN OF CARE
Problem: OCCUPATIONAL THERAPY ADULT  Goal: Performs self-care activities at highest level of function for planned discharge setting.  See evaluation for individualized goals.  Description: Treatment Interventions: ADL retraining, Functional transfer training, Endurance training, Patient/family training, Equipment evaluation/education, Compensatory technique education, Continued evaluation, Energy conservation, Activityengagement          See flowsheet documentation for full assessment, interventions and recommendations.   Note: Limitation: Decreased ADL status, Decreased endurance, Decreased self-care trans, Decreased high-level ADLs (impaired activity tolerance, standing tolerance, balance, forward functional reach)     Assessment: Pt is a 79yo male admitted to [facility] on 2/18/25 following a syncopal episode. Diagnosed w/ sepsis and is on CIWA protocol. Significant PMH impacting his occupational performance includes HTN, anxiety. Rapid response called on 2/19/25 due to acute change in heart rate. Pt reports living w/ his wife in 1 SH at baseline. Pt reports I w/ ADL/ IADL, + drive using cane on stairs. Upon eval, pt alert and generally oriented. Able to follow directions, communicate wants / needs. Pt required S grooming in stance, S UBD, min A LBD, min A toileting, min A bed mobility, S sit <> stand and S for functional mobility w/ and w/ out cane. Pt is completing ADLs below baseline level of I and would benefit from OT in acute care to max I w/ ADLs, achieve highest level of function. Recommend level III rehab resource intensity when medically stable for discharge from acute care. Will continue to follow     Rehab Resource Intensity Level, OT: III (Minimum Resource Intensity)

## 2025-02-20 NOTE — PHYSICAL THERAPY NOTE
PHYSICAL THERAPY EVALUATION NOTE    Patient Name: Devin Cutler  Today's Date: 2/20/2025  AGE:   80 y.o.  Mrn:   [id number]  ADMIT DX:  Syncope [R55]  Pneumonia [J18.9]  RANDAL (acute kidney injury) (HCC) [N17.9]  Encounter for screening involving social determinants of health (SDoH) [Z13.9]    Past Medical History:   Diagnosis Date    GERD (gastroesophageal reflux disease)     Right hip pain 4/22/2021     Length Of Stay: 2  PHYSICAL THERAPY EVALUATION :    02/20/25 1153   PT Last Visit   PT Visit Date 02/20/25   Pain Assessment   Pain Assessment Tool 0-10   Pain Score No Pain   Restrictions/Precautions   Other Precautions Chair Alarm;Bed Alarm;Fall Risk;Multiple lines  (Masimo)   Home Living   Type of Home House   Home Layout One level;Laundry in basement;Other (Comment)  (4 TITA)   Additional Comments lives w/ spouse. independent w/ ADLs and IADLs. provides assist to wife. ambulates w/o device. owns cane and walker.   Prior Function   Falls in the last 6 months 1 to 4   General   Additional Pertinent History 2/20/25 at 00:58 heart rate was 134 BPM.   Family/Caregiver Present No   Cognition   Arousal/Participation Alert   Orientation Level Oriented to person;Other (Comment)  (pt was identified w/ full name, birth date)   Following Commands Follows one step commands with increased time or repetition   Comments room air restig pulse ox 96% and 90 BPM, active 92% and 122 BPM.   Subjective   Subjective pt seen supine in bed. agreed to PT eval. denied pain or dizziness.   RLE Assessment   RLE Assessment WFL  (3+ to 4-/5)   LLE Assessment   LLE Assessment WFL  (3+ to 4-/5)   Light Touch   RLE Light Touch Grossly intact   LLE Light Touch Grossly intact   Bed Mobility   Supine to Sit 4  Minimal assistance   Additional items Assist x 1;HOB elevated;Bedrails;Increased time required   Transfers   Sit to Stand 5  Supervision   Additional items  Increased time required   Stand to Sit 5  Supervision   Additional items Increased time required   Toilet transfer 5  Supervision   Additional items Standard toilet;Increased time required;Other  (right grab bar use)   Ambulation/Elevation   Gait pattern Foward flexed;Short stride;Inconsistent hood   Gait Assistance 5  Supervision  (w/o device. modified independent w/ cane use)   Additional items Verbal cues  (for posture, full step length)   Assistive Device None;SPC   Distance w/o device: 30 feet, pt toileted, 20 feet. w/ cane: 80 feet, pt used steps, 70 feet.  (additional ambulation was not possible due to fatigue.)   Stair Management Assistance 5  Supervision   Additional items Increased time required   Stair Management Technique Two rails;Step to pattern   Number of Stairs 3   Balance   Static Sitting Good   Dynamic Sitting Fair   Static Standing Fair +  (w/ cane)   Ambulatory Fair  (w/ cane)   Activity Tolerance   Activity Tolerance Patient limited by fatigue   Nurse Made Aware spoke to Charline CHRISTIANSON, Glenda OT, Rafaela CM   Assessment   Problem List Decreased strength;Decreased endurance;Impaired balance;Decreased mobility;Decreased safety awareness   Assessment Pt reports productive cough for the past 2 weeks. Pt went to the bar to play pool, when he had a syncopal episodes x 2. Dx: a-fib w/ RVR, sepsis, RLL pneumonia, hyperbilirubinemia, syncopal episodes, essential HTN, and right hip pain. Pt had Rapid Response 2/19/25. order placed for PT eval and tx, w/ activity order of activity as tolerated. pt presents w/ comorbidities of RLS and HTN and personal factors of advanced age, stair(s) to enter home, positive fall history, anxiety, and caregiver role to spouse. pt presents w/ weakness, decreased endurance, impaired balance, gait deviations, decreased safety awareness, and fall risk. these impairments are evident in findings from physical examination (weakness), mobility assessment (need for standby to min  assist w/ all phases of mobility when usually mobilizing independently, tolerance to only 80 feet of ambulation, and need for cueing for mobility technique), and Barthel Index: 65/100. pt needed input for mobility technique. pt is at risk for falls due to physical and safety awareness deficits. pt's clinical presentation is unstable/unpredictable (evident in tachycardia, need for standby to min assist w/ all phases of mobility when usually mobilizing independently, tolerance to only 80 feet of ambulation, and need for input for mobility technique/safety). pt needs inpatient PT tx to improve mobility deficits and progress mobility training as appropriate.   Goals   Patient Goals I want to go home. I want to play pool.   STG Expiration Date 03/06/25   Short Term Goal #1 pt will: Increase bilateral LE strength 1/2 grade to facilitate independent mobility, Perform bed mobility independently to increase level of independence, Perform all transfers independently to improve independence, Ambulate 250 ft. with least restrictive assistive device independently w/o LOB to improve functional independence and expedite eventual return to playing pool, Navigate 2 stair(s) independently with unilateral handrail to facilitate return to previous living environment, Increase ambulatory balance 1 grade to decrease risk for falls, Complete exercise program independently to increase strength and endurance, Tolerate 3 hr OOB to faciliate upright tolerance, Tolerate standing 3 minutes independently to facilitate functional task performance, Improve Barthel Index score to 85 or greater to facilitate independence, and Complete Timed Up and Go or Comfortable Gait Speed to further assess mobility and monitor progress   PT Treatment Day 0   Plan   Treatment/Interventions Functional transfer training;Elevations;LE strengthening/ROM;Therapeutic exercise;Endurance training;Gait training;Bed mobility;Patient/family training;Equipment  eval/education;Compensatory technique education   PT Frequency 3-5x/wk   Discharge Recommendation   Rehab Resource Intensity Level, PT III (Minimum Resource Intensity)   Additional Comments recommend single point cane use w/ ambulation   AM-PAC Basic Mobility Inpatient   Turning in Flat Bed Without Bedrails 4   Lying on Back to Sitting on Edge of Flat Bed Without Bedrails 3   Moving Bed to Chair 3   Standing Up From Chair Using Arms 3   Walk in Room 3   Climb 3-5 Stairs With Railing 3   Basic Mobility Inpatient Raw Score 19   Basic Mobility Standardized Score 42.48   [facility] Highest Level Of Mobility   -HLM Goal 6: Walk 10 steps or more   -HLM Achieved 7: Walk 25 feet or more   Barthel Index   Feeding 10   Bathing 0   Grooming Score 5   Dressing Score 5   Bladder Score 10   Bowels Score 5   Toilet Use Score 5   Transfers (Bed/Chair) Score 10   Mobility (Level Surface) Score 10   Stairs Score 5   Barthel Index Score 65   End of Consult   Patient Position at End of Consult Bedside chair;Bed/Chair alarm activated;All needs within reach     The patient's AM-PAC Basic Mobility Inpatient Short Form Raw Score is 19. A Raw score of greater than 16 suggests the patient may benefit from discharge to home. Please also refer to the recommendation of the Physical Therapist for safe discharge planning.    Skilled PT recommended while in hospital and upon DC to progress pt toward treatment goals.     Don Nix, PT

## 2025-02-20 NOTE — ASSESSMENT & PLAN NOTE
Presented after syncopal episode. Hypotensive in ER with leukocytosis and RANDAL. Elevated lactic acid, improved with fluids. Started on Ceftriaxone for suspected PNA in setting of 2 weeks of cough.   RR on 2/19 for afib with RVR, also febrile at that time. CT AP: with distended gallbladder with mild wall thickening and pericholecystic fluid. Lobulated hypodense lesion in the adjacent liver, highly concerning for pericholecystic abscess.   WBC:16 from 24  Reports mild abdominal pain with cough, no pain at rest. No nausea or vomiting. Abdomen soft with very mild RUQ tenderness

## 2025-02-20 NOTE — ASSESSMENT & PLAN NOTE
WBC   Date Value Ref Range Status   02/20/2025 16.79 (H) 4.31 - 10.16 Thousand/uL Final   02/19/2025 24.98 (H) 4.31 - 10.16 Thousand/uL Final   02/18/2025 18.07 (H) 4.31 - 10.16 Thousand/uL Final     LACTIC ACID   Date Value Ref Range Status   02/19/2025 2.2 (H) 0.5 - 2.0 mmol/L Final   02/19/2025 3.1 (H) 0.5 - 2.0 mmol/L Final     Lab Results   Component Value Date    BLOODCX Escherichia coli (A) 02/18/2025    BLOODCX Escherichia coli (A) 02/18/2025     CT A/P: Over distended gallbladder reflecting Paula cystitis.  Loculated hypodense lesion in adjacent liver not fully characterized by concerning for pericholecystic abscess  CXR negative  CT head showed possible acute right maxillary sinusitis     Assessment: Blood culture growing E. coli suspect translocation from cholecystitis        Plan:  Continue ceftriaxone 2g q24h  Flagyl 500 mg tid  Surgery consult for abscess and cholecystitis

## 2025-02-20 NOTE — PROGRESS NOTES
Progress Note - Hospitalist   Name: Devin Cutler 80 y.o. male I MRN: [id number]  Unit/Bed#: S -01 I Date of Admission: 2/18/2025   Date of Service: 2/20/2025 I Hospital Day: 2    Assessment & Plan  Sepsis (HCC)  WBC   Date Value Ref Range Status   02/20/2025 16.79 (H) 4.31 - 10.16 Thousand/uL Final   02/19/2025 24.98 (H) 4.31 - 10.16 Thousand/uL Final   02/18/2025 18.07 (H) 4.31 - 10.16 Thousand/uL Final     LACTIC ACID   Date Value Ref Range Status   02/19/2025 2.2 (H) 0.5 - 2.0 mmol/L Final   02/19/2025 3.1 (H) 0.5 - 2.0 mmol/L Final     Lab Results   Component Value Date    BLOODCX Escherichia coli (A) 02/18/2025    BLOODCX Escherichia coli (A) 02/18/2025     CT A/P: Over distended gallbladder reflecting Paula cystitis.  Loculated hypodense lesion in adjacent liver not fully characterized by concerning for pericholecystic abscess  CXR negative  CT head showed possible acute right maxillary sinusitis     Assessment: Blood culture growing E. coli suspect translocation from cholecystitis        Plan:  Continue ceftriaxone 2g q24h  Flagyl 500 mg tid  Surgery consult for abscess and cholecystitis  URI symptoms  Reports productive cough over the past two weeks with significant worsening over the last two days with fatigue and poor PO intake with known sick contacts  CT head noted right maxillary sinus may represent acute sinusitis  CXR negative, however increased rales in RLL on exam. Suspect CXR did not show RLL consolidation due to dehydration.  Recent Labs     02/18/25  2138 02/19/25  0506   PROCALCITONI 1.21* 4.64*       Assessment: Possible viral infection followed by superimposed bacterial infection. Treating as community acquired PNA    Plan:  Continue ceftriaxone q24h d2  Mucinex 1200mg BID   [place] Perles 100mg TID prn for cough  Incentive Spirometry  Continue to monitor respiratory   A-fib (HCC) with rvr  Assessment: Suspect triggered by active pulmonary illness and being septic.  Currently  converted back to sinus rhythm on amiodarone drip. Will cross with home metoprolol, initiation of AC will depends on if acute intervention of cholecystitis is indicated.     - continue amiodarone x 24 hrs  - start metoprolol 25 mg qd  Cholecystitis  CT: Overdistended gallbladder with mild wall thickening and pericholecystic fluid, likely reflecting cholecystitis.  Lobulated hypodense lesion in the adjacent liver, not fully characterized but highly concerning for a pericholecystic abscess.     - continue ctx and flagyl  - surg consult  Elevated serum creatinine  Recent Labs     02/19/25  0656 02/19/25  1928 02/20/25  0629   CREATININE 1.01 1.20 1.02   EGFR 70 57 69     Estimated Creatinine Clearance: 61.3 mL/min (by C-G formula based on SCr of 1.02 mg/dL).    POA: Cr: 1.31 (baseline 0.9-1.0), GFR: 51 (baseline 80s)  Likely prerenal 2/2 dehydration/hypotension 2/2 sepsis 2/2 PNA  S/p 1L NSS IVF bolus    Assessment: As of the date of this note, creatinine level has returned near baseline.    Plan:  Monitor BMP  Avoid hypoperfusion of the kidneys, minimize nephrotoxins  Held Celebrex  Hyperbilirubinemia  Recent Labs     02/18/25  2112 02/19/25  0542 02/20/25  0629   AST 26 46* 52*   ALT 21 23 32   ALKPHOS 94 84 83   TBILI 3.52* 2.58* 1.04*   BILIDIR  --  0.54*  --      UA: positive for urobilinogen  History of drinking about 9 drinks per week (3 beers three times per week while playing pole)  No abdominal pain, N/V. Abdomen nontender on exam. Suspect possibly related to sepsis. Hemoglobin normal, low suspicion due to hemolytic anemia at this time.    Suspect secondary to sepsis and cholecystitis     Plan:  Continue to trend LFTs  Surgery consult    Syncopal episodes  Presented to the ED after syncopal episode x2 while playing pool. Hit head on fall.  Trauma work up negative, including negative CT head  Meet Sepsis criteria with tachypnea, hypotension, leukocytosis, and lactic acidosis with suspected PNA etiology.  Initial BP 72/40, responded well to IVF.  Reports productive cough over the past two weeks with significant worsening over the last two days with fatigue and poor PO intake with known sick contacts.  EKG NSR, rate 66  Suspect syncopal episodes 2/2 sepsis 2/2 PNA    Plan:  Treat problem above  Essential hypertension  Initial Blood pressure on admission 72/40. Suspect dehydration/hypotension 2/2 sepsis 2/2 PNA.  S/p 1L NSS IVF bolus  Home regimen: Metoprolol Tartrate 25mg BID    Plan:  Metoprolol 25 mg qd    Anxiety  History of anxiety  Continue Xanax 0.5mg qhs prn  GERD (gastroesophageal reflux disease)  Home regimen: lansoprazole 30mg  Continue Pantoprazole 40mg PO while inpatient  RLS (restless legs syndrome)  Continue Ropinirole 0.5mg TID prn  Hypothyroidism due to acquired atrophy of thyroid  Continue Synthroid 25mg    Right hip pain  History of Right hip pain  Hold Celebrex and Voltaren gel due to RANDAL  Continue lidocaine patch  Continue Tyelnol prn for pain    VTE Pharmacologic Prophylaxis: VTE Score: 6 High Risk (Score >/= 5) - Pharmacological DVT Prophylaxis Ordered: heparin. Sequential Compression Devices Ordered.    Mobility:   Basic Mobility Inpatient Raw Score: 19  JH-HLM Goal: 6: Walk 10 steps or more  JH-HLM Achieved: 7: Walk 25 feet or more  JH-HLM Goal achieved. Continue to encourage appropriate mobility.    Patient Centered Rounds: I performed bedside rounds with nursing staff today.   Discussions with Specialists or Other Care Team Provider: nursing    Education and Discussions with Family / Patient: Updated  (daughter) via phone.    Current Length of Stay: 2 day(s)  Current Patient Status: Inpatient   Certification Statement: The patient will continue to require additional inpatient hospital stay due to sepsis and cholesystitis  Discharge Plan: Anticipate discharge in 48 hrs to home.    Code Status: Level 3 - DNAR and DNI    Subjective   Overnight: Patient was noted to be in SVT,  converted to A-fib with RVR, given metoprolol and started on amiodarone drip.  Converted back to sinus    Complaints: No complaints.     Patient denies Headache, Fever, Chills, Chest Pain, Shortness of breath, Nausea, or Vomiting, Abdominal Pain, Diarrhea, Swellings, Dysuria, Urgency, frequency, or incontinence, Hematuria, new or worsening anxiety or depression    Diet: Diet Regular; Regular House   Bowel regimen:       DVT/VTE Prophylaxis: VTE covered by:  heparin (porcine), Subcutaneous, 5,000 Units at 02/20/25 1312        Objective :  Temp:  [98.2 °F (36.8 °C)-101.5 °F (38.6 °C)] 100 °F (37.8 °C)  HR:  [] 88  BP: ()/(55-72) 130/62  Resp:  [18] 18  SpO2:  [91 %-96 %] 93 %  O2 Device: None (Room air)    Body mass index is 29.49 kg/m².     Input and Output Summary (last 24 hours):     Intake/Output Summary (Last 24 hours) at 2/20/2025 1432  Last data filed at 2/20/2025 1300  Gross per 24 hour   Intake 1120 ml   Output 950 ml   Net 170 ml       Physical Exam  Vitals and nursing note reviewed.   Constitutional:       General: He is not in acute distress.     Appearance: He is well-developed.   HENT:      Head: Normocephalic and atraumatic.   Eyes:      Conjunctiva/sclera: Conjunctivae normal.   Cardiovascular:      Rate and Rhythm: Normal rate and regular rhythm.      Heart sounds: No murmur heard.  Pulmonary:      Effort: Pulmonary effort is normal. No respiratory distress.      Breath sounds: Normal breath sounds.   Abdominal:      Palpations: Abdomen is soft.      Tenderness: There is no abdominal tenderness.   Musculoskeletal:         General: No swelling.      Cervical back: Neck supple.   Skin:     General: Skin is warm and dry.      Capillary Refill: Capillary refill takes less than 2 seconds.   Neurological:      Mental Status: He is alert.   Psychiatric:         Mood and Affect: Mood normal.            Lines/Drains:        Telemetry:  Telemetry Orders (From admission, onward)               24 Hour  Telemetry Monitoring  Continuous x 24 Hours (Telem)        Expiring   Question:  Reason for 24 Hour Telemetry  Answer:  Syncope suspected to be cardiac in origin                     Telemetry Reviewed: Normal Sinus Rhythm after midnight  Indication for Continued Telemetry Use: Arrthymias requiring medical therapy               Lab Results: I have reviewed the following results:   Results from last 7 days   Lab Units 25  0629 25  0836   WBC Thousand/uL 16.79* 24.98*   HEMOGLOBIN g/dL 12.5 12.4   HEMATOCRIT % 36.5 35.9*   PLATELETS Thousands/uL 142* 209   BANDS PCT %  --  8   SEGS PCT % 91*  --    LYMPHO PCT % 5* 2*   MONO PCT % 3* 3*   EOS PCT % 0 0     Results from last 7 days   Lab Units 25  0629   SODIUM mmol/L 132*   POTASSIUM mmol/L 3.3*   CHLORIDE mmol/L 99   CO2 mmol/L 25   BUN mg/dL 18   CREATININE mg/dL 1.02   ANION GAP mmol/L 8   CALCIUM mg/dL 8.5   ALBUMIN g/dL 2.7*   TOTAL BILIRUBIN mg/dL 1.04*   ALK PHOS U/L 83   ALT U/L 32   AST U/L 52*   GLUCOSE RANDOM mg/dL 127     Results from last 7 days   Lab Units 25  0542   INR  1.36*     Results from last 7 days   Lab Units 25  1922   POC GLUCOSE mg/dl 82         Results from last 7 days   Lab Units 25  0656 25  0506 25  2359 25  2138   LACTIC ACID mmol/L 2.2* 3.1* 2.1* 2.5*   PROCALCITONIN ng/ml  --  4.64*  --  1.21*       Recent Cultures (last 7 days):   Results from last 7 days   Lab Units 25  0105 25  2145 25  2138   BLOOD CULTURE   --  Escherichia coli* Escherichia coli*   GRAM STAIN RESULT   --  Gram negative rods* Gram negative rods*   LEGIONELLA URINARY ANTIGEN  Negative  --   --              Last 24 Hours Medication List:     Current Facility-Administered Medications:     acetaminophen (TYLENOL) tablet 650 mg, Q4H PRN    ALPRAZolam (XANAX) tablet 0.5 mg, HS PRN    [] amiodarone (CORDARONE) 900 mg in dextrose 5 % 500 mL infusion, Continuous, Last Rate: Stopped (25 0247)  **FOLLOWED BY** amiodarone (CORDARONE) 900 mg in dextrose 5 % 500 mL infusion, Continuous, Last Rate: 0.5 mg/min (02/20/25 0247)    benzonatate (TESSALON PERLES) capsule 200 mg, TID    ceftriaxone (ROCEPHIN) 2 g/50 mL in dextrose IVPB, Q24H, Last Rate: 2,000 mg (02/19/25 2151)    Cholecalciferol (VITAMIN D3) tablet 1,000 Units, Daily    [Held by provider] Diclofenac Sodium (VOLTAREN) 1 % topical gel 2 g, 4x Daily    folic acid 1 mg, thiamine (VITAMIN B1) 100 mg in sodium chloride 0.9 % 100 mL IV piggyback, Daily, Last Rate: 0 mL/hr at 02/19/25 0913    guaiFENesin (MUCINEX) 12 hr tablet 1,200 mg, BID    heparin (porcine) subcutaneous injection 5,000 Units, Q8H LITO **AND** Platelet count, Once    HYDROcodone Bit-Homatrop MBr (HYCODAN) oral syrup 5 mL, Q4H PRN    levothyroxine tablet 25 mcg, Early Morning    lidocaine (LIDODERM) 5 % patch 1 patch, Daily    metoprolol succinate (TOPROL-XL) 24 hr tablet 25 mg, Daily    metroNIDAZOLE (FLAGYL) tablet 500 mg, Q8H LITO    multivitamin-minerals (CENTRUM) tablet 1 tablet, Daily    pantoprazole (PROTONIX) EC tablet 40 mg, Early Morning    rOPINIRole (REQUIP) tablet 0.5 mg, TID    Administrative Statements   Today, Patient Was Seen By: Juanita Gonzalez MD      **Please Note: This note may have been constructed using a voice recognition system.**

## 2025-02-20 NOTE — PLAN OF CARE
Problem: PHYSICAL THERAPY ADULT  Goal: Performs mobility at highest level of function for planned discharge setting.  See evaluation for individualized goals.  Description: Treatment/Interventions: Functional transfer training, Elevations, LE strengthening/ROM, Therapeutic exercise, Endurance training, Gait training, Bed mobility, Patient/family training, Equipment eval/education, Compensatory technique education          See flowsheet documentation for full assessment, interventions and recommendations.  Note:    Problem List: Decreased strength, Decreased endurance, Impaired balance, Decreased mobility, Decreased safety awareness  Assessment: Pt reports productive cough for the past 2 weeks. Pt went to the bar to play pool, when he had a syncopal episodes x 2. Dx: a-fib w/ RVR, sepsis, RLL pneumonia, hyperbilirubinemia, syncopal episodes, essential HTN, and right hip pain. Pt had Rapid Response 2/19/25. order placed for PT eval and tx, w/ activity order of activity as tolerated. pt presents w/ comorbidities of RLS and HTN and personal factors of advanced age, stair(s) to enter home, positive fall history, anxiety, and caregiver role to spouse. pt presents w/ weakness, decreased endurance, impaired balance, gait deviations, decreased safety awareness, and fall risk. these impairments are evident in findings from physical examination (weakness), mobility assessment (need for standby to min assist w/ all phases of mobility when usually mobilizing independently, tolerance to only 80 feet of ambulation, and need for cueing for mobility technique), and Barthel Index: 65/100. pt needed input for mobility technique. pt is at risk for falls due to physical and safety awareness deficits. pt's clinical presentation is unstable/unpredictable (evident in tachycardia, need for standby to min assist w/ all phases of mobility when usually mobilizing independently, tolerance to only 80 feet of ambulation, and need for input for  mobility technique/safety). pt needs inpatient PT tx to improve mobility deficits and progress mobility training as appropriate.        Rehab Resource Intensity Level, PT: III (Minimum Resource Intensity)    See flowsheet documentation for full assessment.

## 2025-02-20 NOTE — CONSULTS
Consultation - Surgery-General   Name: Devin Cutler 80 y.o. male I MRN: [id number]  Unit/Bed#: S -01 I Date of Admission: 2/18/2025   Date of Service: 2/20/2025 I Hospital Day: 2   Inpatient consult to Acute Care Surgery  Consult performed by: Jackie Álvarez PA-C  Consult ordered by: Brett Palacio MD        Physician Requesting Evaluation: Nickolas Sargent MD   Reason for Evaluation / Principal Problem: abnormal gallbladder on CT, fevers    Assessment & Plan  Sepsis (HCC)  Presented after syncopal episode. Hypotensive in ER with leukocytosis and RANDAL. Elevated lactic acid, improved with fluids. Started on Ceftriaxone for suspected PNA in setting of 2 weeks of cough.   RR on 2/19 for afib with RVR, also febrile at that time. CT AP: with distended gallbladder with mild wall thickening and pericholecystic fluid. Lobulated hypodense lesion in the adjacent liver, highly concerning for pericholecystic abscess.   WBC:16 from 24  Reports mild abdominal pain with cough, no pain at rest. No nausea or vomiting. Abdomen soft with very mild RUQ tenderness  Hyperbilirubinemia  Tbili elevated to 3.5 on admission, down-trending, now 1.04  Likely in the setting of acute cholecystitis  RUQ us: sludge with wall thickening. Some gravel might be present. No common bile duct dilation.    See below  Trend LFTs  Cholecystitis  Imaging evidence of acute cholecystitis complicated by possible pericholecystic abscess in liver     Will consult IR for percutaneous cholecystomy tube and possible abscess drain placement  NPOpMN  Continue ABX  Trend CBC and fever curve    Rest of care per primary team   Discussed with   Please contact the SecureChat role for the Surgery-General service with any questions/concerns.    History of Present Illness   Devin Cutler is a 80 y.o. male with past medical history of GERD who presents after he syncopized.  Patient reports he has had cough for about 2 weeks.  But otherwise he was well  prior to his syncope.  He denies any lightheadedness or dizzy prior to event. Since being admitted to the hospital he has felt well, denies any symptoms of chest pain or palpitations during his episode of A-fib with RVR.  He denies any fever, chills, nausea or vomiting.  He does report some mild lower chest and upper abdominal pain only when he coughs.  Patient reports at baseline he has a low appetite, no significant change in his appetite.    He denies any prior abdominal surgeries, or anticoagulation use    Review of Systems   Constitutional:  Negative for activity change, appetite change, chills and fever.   Respiratory:  Negative for shortness of breath.    Cardiovascular:  Negative for chest pain and palpitations.   Gastrointestinal:  Negative for abdominal pain, nausea and vomiting.   Neurological:  Negative for dizziness, weakness and light-headedness.     Historical Information   Past Medical History:   Diagnosis Date    GERD (gastroesophageal reflux disease)     Right hip pain 4/22/2021     History reviewed. No pertinent surgical history.  Social History     Tobacco Use    Smoking status: Former     Types: Cigarettes     Passive exposure: Past    Smokeless tobacco: Never    Tobacco comments:     per [facility]   Vaping Use    Vaping status: Never Used   Substance and Sexual Activity    Alcohol use: Yes     Comment: occasional    Drug use: Never     Comment: No use    Sexual activity: Not on file     E-Cigarette/Vaping    E-Cigarette Use Never User      E-Cigarette/Vaping Substances    Nicotine No     THC No     CBD No     Flavoring No     Other No     Unknown No      Family History   Family history unknown: Yes   Reason cannot obtain [facility]:[id number]}    Objective :  Temp:  [98.2 °F (36.8 °C)-101.5 °F (38.6 °C)] 100 °F (37.8 °C)  HR:  [] 88  BP: ()/(55-72) 130/62  Resp:  [18] 18  SpO2:  [91 %-96 %] 93 %  O2 Device: None (Room air)      Physical Exam  Constitutional:       General: He is not in acute  distress.     Appearance: He is normal weight. He is not ill-appearing or toxic-appearing.      Comments: Sitting in chair having lunch   Cardiovascular:      Rate and Rhythm: Normal rate.   Pulmonary:      Effort: Pulmonary effort is normal. No respiratory distress.   Abdominal:      General: Abdomen is flat. There is no distension.      Palpations: Abdomen is soft.      Tenderness: There is abdominal tenderness in the right upper quadrant. There is no guarding or rebound.      Comments: Mild lateral RUQ pain with deep palpation   Skin:     General: Skin is warm and dry.   Neurological:      General: No focal deficit present.      Mental Status: He is alert.   Psychiatric:         Mood and Affect: Mood normal.         Behavior: Behavior normal.         Lab Results: I have reviewed the following results:  Recent Labs     02/19/25  0542 02/19/25  0656 02/19/25  0751 02/19/25  0836 02/19/25  1928 02/19/25  2105 02/19/25  2308 02/20/25  0629   WBC  --   --   --  24.98*  --   --   --  16.79*   HGB  --   --   --  12.4  --   --   --  12.5   HCT  --   --   --  35.9*  --   --   --  36.5   PLT  --   --   --  209  --   --   --  142*   BANDSPCT  --   --   --  8  --   --   --   --    SODIUM 130* 131*  --   --  133*  --   --  132*   K 5.4* 3.5  --   --  3.8  --   --  3.3*   CL 98 99  --   --  97  --   --  99   CO2 24 23  --   --  23  --   --  25   BUN 17 17  --   --  19  --   --  18   CREATININE 1.07 1.01  --   --  1.20  --   --  1.02   GLUC 78 87  --   --  89  --   --  127   CAIONIZED  --   --   --   --  1.10*  --   --   --    MG  --   --   --   --  1.7*  --   --   --    AST 46*  --   --   --   --   --   --  52*   ALT 23  --   --   --   --   --   --  32   ALB 2.9*  --   --   --   --   --   --  2.7*   TBILI 2.58*  --   --   --   --   --   --  1.04*   ALKPHOS 84  --   --   --   --   --   --  83   INR 1.36*  --   --   --   --   --   --   --    HSTNI0  --   --   --   --   --  98*  --   --    HSTNI2  --   --   --   --   --   --   101*  --    BNP  --   --  463*  --   --   --   --   --    LACTICACID  --  2.2*  --   --   --   --   --   --        Imaging Results Review: I reviewed radiology reports from this admission including: CT abdomen/pelvis and Ultrasound(s).  Other Study Results Review: No additional pertinent studies reviewed.    VTE Pharmacologic Prophylaxis: VTE covered by:  heparin (porcine), Subcutaneous, 5,000 Units at 02/20/25 1312     VTE Mechanical Prophylaxis: sequential compression device    Administrative Statements   I have spent a total time of 30 minutes in caring for this patient on the day of the visit/encounter including Diagnostic results, Documenting in the medical record, Obtaining or reviewing history  , and Communicating with other healthcare professionals .

## 2025-02-20 NOTE — QUICK NOTE
Repleted Mag and calcium. HR on chart review appears much improved. Updated RN and SLIM provider.   TSH still pending. Would continue to trend troponin.

## 2025-02-20 NOTE — PLAN OF CARE

## 2025-02-20 NOTE — ASSESSMENT & PLAN NOTE
Recent Labs     02/18/25  2112 02/19/25  0542 02/20/25  0629   AST 26 46* 52*   ALT 21 23 32   ALKPHOS 94 84 83   TBILI 3.52* 2.58* 1.04*   BILIDIR  --  0.54*  --      UA: positive for urobilinogen  History of drinking about 9 drinks per week (3 beers three times per week while playing pole)  No abdominal pain, N/V. Abdomen nontender on exam. Suspect possibly related to sepsis. Hemoglobin normal, low suspicion due to hemolytic anemia at this time.    Suspect secondary to sepsis and cholecystitis     Plan:  Continue to trend LFTs  Surgery consult

## 2025-02-20 NOTE — OCCUPATIONAL THERAPY NOTE
"    Occupational Therapy Evaluation     Patient Name: Devin Cutler  Today's Date: 2/20/2025  Problem List  Principal Problem:    Sepsis (HCC)  Active Problems:    RLS (restless legs syndrome)    Essential hypertension    Anxiety    GERD (gastroesophageal reflux disease)    Right hip pain    Hypothyroidism due to acquired atrophy of thyroid    URI symptoms    Elevated serum creatinine    Hyperbilirubinemia    Syncopal episodes    A-fib (HCC) with rvr    Cholecystitis    Past Medical History  Past Medical History:   Diagnosis Date    GERD (gastroesophageal reflux disease)     Right hip pain 4/22/2021     Past Surgical History  History reviewed. No pertinent surgical history.        02/20/25 1127   OT Last Visit   OT Visit Date 02/20/25   Note Type   Note type Evaluation  (Eval 1127-)   Additional Comments Identified pt by full name and birthdate; prefers \"[place]\"   Pain Assessment   Pain Assessment Tool 0-10   Pain Score No Pain   Restrictions/Precautions   Weight Bearing Precautions Per Order No   Other Precautions Chair Alarm;Bed Alarm;Fall Risk  (Manny, room air)   Home Living   Type of Home House   Home Layout One level;Laundry in basement  (4 TITA)   Bathroom Shower/Tub Tub/shower unit   Bathroom Toilet Standard   Bathroom Equipment Grab bars in shower;Shower chair;Commode;Grab bars around toilet   Bathroom Accessibility Accessible   Home Equipment Grab bars;Walker;Cane   Additional Comments Pt reports living w/ wife in 1 SH   Prior Function   Level of [place] Independent with ADLs;Independent with functional mobility;Independent with IADLS   Lives With Spouse;Other (Comment)  (wife)   Receives Help From Family  (supportive daughter, son)   IADLs Independent with driving;Independent with meal prep;Independent with medication management   Falls in the last 6 months 1 to 4  (+ fall history, syncopal)   Vocational Retired   Comments Pt reports I w/ ADLs/ IADLs, + drive and use of cane on stairs.   Lifestyle "   Autonomy Pt reports I w/ ADLs at baseline w/ out use of AD   Reciprocal Relationships Pt reports living w/ wife. 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 on 2/18/25 following syncopal event. Diagnosed w/ sepsis.   Family/Caregiver Present No   Additional General Comments Personal and environmental factors supporting performance includes independent at baseline, supportive wife / family, access to AD/ DME; barriers include fall history, alcohol use/ abuse   ADL   Where Assessed Edge of bed  (vs bathroom)   Eating Assistance 7  Independent   Eating Deficit Setup   Grooming Assistance 5  Supervision/Setup   Grooming Deficit Setup;Supervision/safety;Increased time to complete  (in stance at sink to wash / dry hands)   UB Bathing Assistance Unable to assess   LB Bathing Assistance Unable to assess   UB Dressing Assistance 5  Supervision/Setup   UB Dressing Deficit Setup;Increased time to complete;Supervision/safety  (due to lines (IV, Manny))   LB Dressing Assistance 4  Minimal Assistance   LB Dressing Deficit Setup;Thread RLE into pants;Thread LLE into pants;Thread LLE into underwear;Thread RLE into underwear;Increased time to complete;Supervision/safety   Toileting Assistance  4  Minimal Assistance  (min A for thoroughness during hygiene, steadying A in stance)   Toileting Deficit Setup;Supervison/safety;Increased time to complete;Perineal hygiene   Bed Mobility   Supine to Sit 4  Minimal assistance   Additional items Assist x 1;Increased time required  (to pt's L to simulate home)   Sit to Supine Unable to assess   Transfers   Sit to Stand 5  Supervision   Additional items Assist x 1;Increased time required   Stand to Sit 5  Supervision   Additional items Assist x 1;Increased time required   Toilet transfer 5  Supervision   Additional items  Assist x 1;Increased time required;Standard toilet   Additional Comments Pt performed sit <> stand 3X w/ S   Functional Mobility   Functional Mobility 5  Supervision   Additional Comments engaged in functional mobility to / from bathroom w/ out use of AD to bathroom w/ S; S using cane from bathroom   Additional items   (cane vs no AD)   Balance   Static Sitting Good   Dynamic Sitting Fair   Static Standing Fair   Ambulatory Fair   Activity Tolerance   Activity Tolerance Patient limited by fatigue   Medical Staff Made Aware care coordination w/ PTJAIDEN for portion of eval due to decreased activity tolerance, regression from baseline and decreased activity tolerance. Communicated w/ CM via EPIC secure chat   Nurse Made Aware spoke w/ Charline IZQUIERDO Assessment   RUE Assessment   (observed during ADLs)   RUE Strength   RUE Overall Strength Within Functional Limits - able to perform ADL tasks with strength   LUE Assessment   LUE Assessment   (observed during ADLs)   LUE Strength   LUE Overall Strength Within Functional Limits - able to perform ADL tasks with strength   Hand Function   Gross Motor Coordination Functional   Fine Motor Coordination Functional   Cognition   Overall Cognitive Status WFL   Arousal/Participation Alert;Cooperative   Attention Attends with cues to redirect   Orientation Level Oriented X4   Memory Within functional limits   Following Commands Follows multistep commands without difficulty   Comments Alert, generally oriented and able to follow directions during ADLs.   Assessment   Limitation Decreased ADL status;Decreased endurance;Decreased self-care trans;Decreased high-level ADLs  (impaired activity tolerance, standing tolerance, balance, forward functional reach)   Assessment Pt is a 79yo male admitted to [facility] on 2/18/25 following a syncopal episode. Diagnosed w/ sepsis and is on CIWA protocol. Significant PMH impacting his occupational performance includes HTN, anxiety. Rapid response  called on 2/19/25 due to acute change in heart rate. Pt reports living w/ his wife in 1 SH at baseline. Pt reports I w/ ADL/ IADL, + drive using cane on stairs. Upon eval, pt alert and generally oriented. Able to follow directions, communicate wants / needs. Pt required S grooming in stance, S UBD, min A LBD, min A toileting, min A bed mobility, S sit <> stand and S for functional mobility w/ and w/ out cane. Pt is completing ADLs below baseline level of I and would benefit from OT in acute care to max I w/ ADLs, achieve highest level of function. Recommend level III rehab resource intensity when medically stable for discharge from acute care. Will continue to follow   Goals   Patient Goals Pt stated that he wants to go home and play pool   LTG Time Frame 7-10   Long Term Goal see below   Plan   Treatment Interventions ADL retraining;Functional transfer training;Endurance training;Patient/family training;Equipment evaluation/education;Compensatory technique education;Continued evaluation;Energy conservation;Activityengagement   Goal Expiration Date 03/02/25   OT Treatment Day 0  (Thursday 2/20/25)   OT Frequency 3-5x/wk   Discharge Recommendation   Rehab Resource Intensity Level, OT III (Minimum Resource Intensity)   AM-PAC Daily Activity Inpatient   Lower Body Dressing 3   Bathing 3   Toileting 3   Upper Body Dressing 3   Grooming 4   Eating 4   Daily Activity Raw Score 20   Daily Activity Standardized Score (Calc for Raw Score >=11) 42.03   AM-PAC Applied Cognition Inpatient   Following a Speech/Presentation 4   Understanding Ordinary Conversation 4   Taking Medications 3   Remembering Where Things Are Placed or Put Away 4   Remembering List of 4-5 Errands 4   Taking Care of Complicated Tasks 3   Applied Cognition Raw Score 22   Applied Cognition Standardized Score 47.83   Barthel Index   Feeding 10   Bathing 0   Grooming Score 5   Dressing Score 5   Bladder Score 5   Bowels Score 5   Toilet Use Score 5    Transfers (Bed/Chair) Score 10   Mobility (Level Surface) Score 0   Stairs Score 5   Barthel Index Score 50   End of Consult   Education Provided Yes   Patient Position at End of Consult Bedside chair;Bed/Chair alarm activated;All needs within reach   Nurse Communication Nurse aware of consult   Licensure   [place] License Number  Glenda CINTIA Flip, OTR/L         The patient's raw score on the AM-PAC Daily Activity Inpatient Short Form is 20. A raw score of greater than or equal to 19 suggests the patient may benefit from discharge to home. Please refer to the recommendation of the Occupational Therapist for safe discharge planning.      Pt goals to be met by 3/2/25 to max I w/ ADLs and improve engagement to return home to [facility], baseline level of I to play pool includes:    -Pt will complete bed mobility supine <> sit w/ mod I in preparation for ADLs    -Pt will complete functional transfers to bed, chair, and toilet using LRAD, DME as needed independently    -Pt will complete shower transfer w/ mod I for + time to max I w/ bathing    -Pt will consistently engage in functional mobility using LRAD household distances independently    -Pt will demonstrate improved activity and sitting tolerance OOB in chair for all meals    -Pt will complete UBD/LBD w/ mod I for + time w/ out rest break or sign /symptoms of fatigue    -Pt will demonstrate improved functional standing tolerance for at least 15 minutes using LRAD w/ at least fair + balance while engaged in grooming in stance independently to max I w/ light IADLs to return home, play pool    -Pt will demonstrate improved AMPAC score to at least 23 to max I w/ ADLs, assist in DC Planning.       Glenda Castelan, OTR/L  [id number]  [id number]

## 2025-02-20 NOTE — ASSESSMENT & PLAN NOTE
Assessment: Suspect triggered by active pulmonary illness and being septic.  Currently converted back to sinus rhythm on amiodarone drip. Will cross with home metoprolol, initiation of AC will depends on if acute intervention of cholecystitis is indicated.     - continue amiodarone x 24 hrs  - start metoprolol 25 mg qd

## 2025-02-20 NOTE — ASSESSMENT & PLAN NOTE
Imaging evidence of acute cholecystitis complicated by possible pericholecystic abscess in liver     Will consult IR for percutaneous cholecystomy tube and possible abscess drain placement  NPOpMN  Continue ABX  Trend CBC and fever curve    Rest of care per primary team   Discussed with

## 2025-02-20 NOTE — CASE MANAGEMENT
Case Management Discharge Planning Note    Patient name Devin FLOYD /S -01 MRN [id number]  : 1945 Date 2025       Current Admission Date: 2025  Current Admission Diagnosis:Sepsis (HCC)   Patient Active Problem List    Diagnosis Date Noted Date Diagnosed    Sepsis (HCC) 2025     Pneumonia of right lower lobe due to infectious organism 2025     Elevated serum creatinine 2025     Hyperbilirubinemia 2025     Syncopal episodes 2025     Hypothyroidism due to acquired atrophy of thyroid 2024     Right hip pain 2021     GERD (gastroesophageal reflux disease)      RLS (restless legs syndrome) 10/22/2020     Essential hypertension 10/22/2020     Anxiety 10/22/2020       LOS (days): 2  Geometric Mean LOS (GMLOS) (days): 4.9  Days to GMLOS:3.3     OBJECTIVE:  Risk of Unplanned Readmission Score: 8.8         Current admission status: Inpatient   Preferred Pharmacy:   [id number] - [place] [address] [address]  Phone: [phone] Fax: [phone]    [facility] - [facility] [address]  [address] [address]  Phone: [phone] Fax: [phone]    Primary Care Provider: Momo Trinidad MD    Primary Insurance: [employer]  Secondary Insurance:     DISCHARGE DETAILS:    Discharge planning discussed with:: patient  Freedom of Choice: Yes  Comments - Freedom of Choice: CM f/u with patient re: PT/OT rcmd for HHC. Patient declined both HHC and OPPT/OT as expressed he feels he does not need it.  CM contacted family/caregiver?: Yes (dcp update provided to pt's lisbet Sanon)  Were Treatment Team discharge recommendations reviewed with patient/caregiver?: Yes  Did patient/caregiver verbalize understanding of patient care needs?: Yes  Were patient/caregiver advised of the risks associated with not following Treatment Team discharge  recommendations?: Yes    Contacts  Patient Contacts: Talita (Daughter)  Relationship to Patient:: Family  Contact Method: Phone  Phone Number: [phone]  Reason/Outcome: Continuity of Care, Emergency Contact, Referral, Discharge Planning    Requested Home Health Care         Is the patient interested in HHC at discharge?: No    DME Referral Provided  Referral made for DME?: No    Other Referral/Resources/Interventions Provided:  Interventions: HHC  Referral Comments: PT/OT rcmd HHC. Patient declined HHC and OPPT/OT.         Treatment Team Recommendation: Home with Home Health Care  Discharge Destination Plan:: Home  Transport at Discharge : Family

## 2025-02-20 NOTE — NURSING NOTE
0000 Heart rate 150s-160s. Amiodarone drip started at 1942. Patient asymptomatic. Resident aware. No new orders at this time.     Tete Guallpa RN.

## 2025-02-20 NOTE — ASSESSMENT & PLAN NOTE
Reports productive cough over the past two weeks with significant worsening over the last two days with fatigue and poor PO intake with known sick contacts  CT head noted right maxillary sinus may represent acute sinusitis  CXR negative, however increased rales in RLL on exam. Suspect CXR did not show RLL consolidation due to dehydration.  Recent Labs     02/18/25  2138 02/19/25  0506   PROCALCITONI 1.21* 4.64*       Assessment: Possible viral infection followed by superimposed bacterial infection. Treating as community acquired PNA    Plan:  Continue ceftriaxone q24h d2  Mucinex 1200mg BID   [place] Perles 100mg TID prn for cough  Incentive Spirometry  Continue to monitor respiratory

## 2025-02-20 NOTE — ASSESSMENT & PLAN NOTE
Recent Labs     02/19/25  0656 02/19/25  1928 02/20/25  0629   CREATININE 1.01 1.20 1.02   EGFR 70 57 69     Estimated Creatinine Clearance: 61.3 mL/min (by C-G formula based on SCr of 1.02 mg/dL).    POA: Cr: 1.31 (baseline 0.9-1.0), GFR: 51 (baseline 80s)  Likely prerenal 2/2 dehydration/hypotension 2/2 sepsis 2/2 PNA  S/p 1L NSS IVF bolus    Assessment: As of the date of this note, creatinine level has returned near baseline.    Plan:  Monitor BMP  Avoid hypoperfusion of the kidneys, minimize nephrotoxins  Held Celebrex

## 2025-02-20 NOTE — RAPID RESPONSE
Rapid Response Note  Devin Cutler 80 y.o. male MRN: [id number]  Unit/Bed#: S -01 Encounter: [id number]    Rapid Response Notification(s):   Response called date/time:  2/19/2025 7:15 PM  Response team arrival date/time:  2/19/2025 7:17 PM  Response end date/time:  2/19/2025 7:30 PM  Level of care:  Medr  Rapid response location:  [facility] unit  Primary reason for rapid response call:  Acute change in heart rate    Rapid Response Intervention(s):   Airway:  None  Breathing:  None  Circulation:  None  Fluids administered:  None  Medications administered:  Amiodarone and other (comment) (metoprolol)       Assessment:   Atrial Fibrillation with RVR   - SBP 120s-130   - Denies SOB or lightheaded   - Room Air   - AAOX3   - Attempted Labetalol prior to my arrival at [facility]  - Denies history of a-fib. Not on AC.     Plan:   EKG ordered   STAT CBC, BMP, Mag, Phos, Troponins   5 mg Metoprolol IV now (HR improvement to 130s-140s from 160-180s) Scheduled BB was held in AM secondary to soft BPs  Check TSH and Free T4. Continue current supplementation for now  Amio bolus and gtt   Replete lytes if necessary      Rapid Response Outcome:   Transfer:  Remain on floor  Primary service notified of transfer: Yes (Present at [facility])    Code Status: Level 3 (DNAR and DNI)      Family notified: Primary team to notify Family Member       Background/Situation:   Devin Cutler is a 80 y.o. male who presents to the ED today after syncopal episode while playing pool. PMH notable for hypothyroidism on supplementation, GERD, RLS, anxiety, ETOH use, and, HTN on BB. This evening was a rapid response for a.fib with RVR. Prior to calling rapid they trialed labetalol and covering him in ice. Unfortunately, rates remained 160-180s. Reviewed EKGs all remarkable for afib with rvr. Hemodynamically stable through the rapid with systolic pressures in the 120-130s. Given 5mg IV metoprolol with some improvement in rates to 130s. Ordered amio bolus  and gtt.   Ordered STAT labs to check lytes and TSH levels.   Primary team present at rapid and will address AC plan in AM.     Review of Systems   Respiratory:  Negative for chest tightness and shortness of breath.    Neurological:  Negative for dizziness and light-headedness.       Objective:   Vitals:    02/19/25 1855 02/19/25 1915 02/19/25 1919 02/19/25 1922   BP: 130/59  134/72 110/64   BP Location:       Pulse: (!) 188 (!) 175 (!) 168 (!) 139   Resp:       Temp:    98.2 °F (36.8 °C)   TempSrc:       SpO2: 93%   94%   Weight:       Height:         Physical Exam  HENT:      Head: Normocephalic.      Mouth/Throat:      Mouth: Mucous membranes are moist.   Eyes:      Pupils: Pupils are equal, round, and reactive to light.   Cardiovascular:      Rate and Rhythm: Tachycardia present. Rhythm irregular.      Pulses: Normal pulses.   Pulmonary:      Effort: Pulmonary effort is normal.   Abdominal:      General: Abdomen is flat.      Palpations: Abdomen is soft.   Skin:     General: Skin is warm and dry.      Capillary Refill: Capillary refill takes less than 2 seconds.   Neurological:      General: No focal deficit present.      Mental Status: He is alert and oriented to person, place, and time.

## 2025-02-20 NOTE — ASSESSMENT & PLAN NOTE
Presented to the ED after syncopal episode x2 while playing pool. Hit head on fall.  Trauma work up negative, including negative CT head  Meet Sepsis criteria with tachypnea, hypotension, leukocytosis, and lactic acidosis with suspected PNA etiology. Initial BP 72/40, responded well to IVF.  Reports productive cough over the past two weeks with significant worsening over the last two days with fatigue and poor PO intake with known sick contacts.  EKG NSR, rate 66  Suspect syncopal episodes 2/2 sepsis 2/2 PNA    Plan:  Treat problem above

## 2025-02-20 NOTE — ASSESSMENT & PLAN NOTE
Tbili elevated to 3.5 on admission, down-trending, now 1.04  Likely in the setting of acute cholecystitis  RUQ us: sludge with wall thickening. Some gravel might be present. No common bile duct dilation.    See below  Trend LFTs

## 2025-02-20 NOTE — CASE MANAGEMENT
Case Management Assessment & Discharge Planning Note    Patient name Devin Cutler  Location S /S -01 MRN [id number]  : 1945 Date 2025       Current Admission Date: 2025  Current Admission Diagnosis:Sepsis (HCC)   Patient Active Problem List    Diagnosis Date Noted Date Diagnosed    Sepsis (HCC) 2025     Pneumonia of right lower lobe due to infectious organism 2025     Elevated serum creatinine 2025     Hyperbilirubinemia 2025     Syncopal episodes 2025     Hypothyroidism due to acquired atrophy of thyroid 2024     Right hip pain 2021     GERD (gastroesophageal reflux disease)      RLS (restless legs syndrome) 10/22/2020     Essential hypertension 10/22/2020     Anxiety 10/22/2020       LOS (days): 2  Geometric Mean LOS (GMLOS) (days): 4.9  Days to GMLOS:3.4     OBJECTIVE:    Risk of Unplanned Readmission Score: 8.67         Current admission status: Inpatient       Preferred Pharmacy:   [facility] - [place], [place] - [address] [facility]  [address] [facility] [address]  Phone: [phone] Fax: [phone]    Primary Care Provider: Momo Trinidad MD    Primary Insurance: [employer]  Secondary Insurance:     ASSESSMENT:  Active Health Care Proxies    There are no active Health Care Proxies on file.                      Patient Information  Admitted from:: Home  Mental Status: Alert  During Assessment patient was accompanied by: Not accompanied during assessment  Assessment information provided by:: Patient  Primary Caregiver: Self  Support Systems: Family members  County of Residence: [place]  What city do you live in?: [place]  Home entry access options. Select all that apply.: Stairs  Number of steps to enter home.: 4  Type of Current Residence: [place] (laundry located in basement)  Living Arrangements: Lives w/ Spouse/significant other    Activities of Daily Living Prior to Admission  Functional  Status: Independent  Completes ADLs independently?: Yes  Ambulates independently?: Yes  Does patient use assisted devices?: Yes  Assisted Devices (DME) used: Straight Cane (pt utilizes cane to ambulate steps)  Does patient currently own DME?: Yes  What DME does the patient currently own?: Bedside Commode, Shower Chair, Straight Cane, Walker, Wheelchair  Does patient have a history of Outpatient Therapy (PT/OT)?: No  Does the patient have a history of Short-Term Rehab?: No  Does patient have a history of HHC?: No  Does patient currently have HHC?: No         Patient Information Continued  Does patient have prescription coverage?: Yes  Does patient receive dialysis treatments?: No         Means of Transportation  Means of Transport to Appts:: Drives Self (pt drives locally or family provides transport for further appointments)      Social Determinants of Health (SDOH)      Flowsheet Row Most Recent Value   Housing Stability    In the last 12 months, was there a time when you were not able to pay the mortgage or rent on time? N   At any time in the past 12 months, were you homeless or living in a shelter (including now)? N   Transportation Needs    In the past 12 months, has lack of transportation kept you from medical appointments or from getting medications? no   In the past 12 months, has lack of transportation kept you from meetings, work, or from getting things needed for daily living? No   Food Insecurity    Within the past 12 months, you worried that your food would run out before you got the money to buy more. Never true   Within the past 12 months, the food you bought just didn't last and you didn't have money to get more. Never true   Utilities    In the past 12 months has the electric, gas, oil, or water company threatened to shut off services in your home? No            DISCHARGE DETAILS:                                                                                     IMM reviewed with patient, patient  agrees with discharge determination.   IMM Given (Date):: 02/20/25  IMM Given to:: Patient

## 2025-02-20 NOTE — ASSESSMENT & PLAN NOTE
CT: Overdistended gallbladder with mild wall thickening and pericholecystic fluid, likely reflecting cholecystitis.  Lobulated hypodense lesion in the adjacent liver, not fully characterized but highly concerning for a pericholecystic abscess.     - continue ctx and flagyl  - surg consult

## 2025-02-20 NOTE — ASSESSMENT & PLAN NOTE
Initial Blood pressure on admission 72/40. Suspect dehydration/hypotension 2/2 sepsis 2/2 PNA.  S/p 1L NSS IVF bolus  Home regimen: Metoprolol Tartrate 25mg BID    Plan:  Metoprolol 25 mg qd

## 2025-02-21 ENCOUNTER — APPOINTMENT (INPATIENT)
Dept: RADIOLOGY | Facility: HOSPITAL | Age: 80
DRG: 871 | End: 2025-02-21
Attending: RADIOLOGY
Payer: COMMERCIAL

## 2025-02-21 LAB
ALBUMIN SERPL BCG-MCNC: 2.6 G/DL (ref 3.5–5)
ALP SERPL-CCNC: 89 U/L (ref 34–104)
ALT SERPL W P-5'-P-CCNC: 26 U/L (ref 7–52)
ANION GAP SERPL CALCULATED.3IONS-SCNC: 8 MMOL/L (ref 4–13)
AST SERPL W P-5'-P-CCNC: 29 U/L (ref 13–39)
BACTERIA BLD CULT: ABNORMAL
BACTERIA BLD CULT: ABNORMAL
BASOPHILS # BLD AUTO: 0.05 THOUSANDS/ΜL (ref 0–0.1)
BASOPHILS NFR BLD AUTO: 0 % (ref 0–1)
BILIRUB SERPL-MCNC: 0.95 MG/DL (ref 0.2–1)
BUN SERPL-MCNC: 20 MG/DL (ref 5–25)
CALCIUM ALBUM COR SERPL-MCNC: 8.7 MG/DL (ref 8.3–10.1)
CALCIUM SERPL-MCNC: 7.6 MG/DL (ref 8.4–10.2)
CHLORIDE SERPL-SCNC: 101 MMOL/L (ref 96–108)
CO2 SERPL-SCNC: 21 MMOL/L (ref 21–32)
CREAT SERPL-MCNC: 0.74 MG/DL (ref 0.6–1.3)
E COLI DNA BLD POS QL NAA+NON-PROBE: DETECTED
EOSINOPHIL # BLD AUTO: 0.03 THOUSAND/ΜL (ref 0–0.61)
EOSINOPHIL NFR BLD AUTO: 0 % (ref 0–6)
ERYTHROCYTE [DISTWIDTH] IN BLOOD BY AUTOMATED COUNT: 13.2 % (ref 11.6–15.1)
GFR SERPL CREATININE-BSD FRML MDRD: 87 ML/MIN/1.73SQ M
GLUCOSE SERPL-MCNC: 96 MG/DL (ref 65–140)
GRAM STN SPEC: ABNORMAL
GRAM STN SPEC: ABNORMAL
HCT VFR BLD AUTO: 36 % (ref 36.5–49.3)
HGB BLD-MCNC: 12.4 G/DL (ref 12–17)
IMM GRANULOCYTES # BLD AUTO: 0.17 THOUSAND/UL (ref 0–0.2)
IMM GRANULOCYTES NFR BLD AUTO: 1 % (ref 0–2)
LYMPHOCYTES # BLD AUTO: 0.68 THOUSANDS/ΜL (ref 0.6–4.47)
LYMPHOCYTES NFR BLD AUTO: 5 % (ref 14–44)
MCH RBC QN AUTO: 30.4 PG (ref 26.8–34.3)
MCHC RBC AUTO-ENTMCNC: 34.4 G/DL (ref 31.4–37.4)
MCV RBC AUTO: 88 FL (ref 82–98)
MONOCYTES # BLD AUTO: 0.66 THOUSAND/ΜL (ref 0.17–1.22)
MONOCYTES NFR BLD AUTO: 5 % (ref 4–12)
NEUTROPHILS # BLD AUTO: 12.71 THOUSANDS/ΜL (ref 1.85–7.62)
NEUTS SEG NFR BLD AUTO: 89 % (ref 43–75)
NRBC BLD AUTO-RTO: 0 /100 WBCS
PLATELET # BLD AUTO: 144 THOUSANDS/UL (ref 149–390)
PMV BLD AUTO: 9.7 FL (ref 8.9–12.7)
POTASSIUM SERPL-SCNC: 3.7 MMOL/L (ref 3.5–5.3)
PROT SERPL-MCNC: 5.4 G/DL (ref 6.4–8.4)
QRS AXIS: 22 DEGREES
QRS AXIS: 23 DEGREES
QRSD INTERVAL: 88 MS
QRSD INTERVAL: 88 MS
QT INTERVAL: 264 MS
QT INTERVAL: 322 MS
QTC INTERVAL: 406 MS
QTC INTERVAL: 502 MS
RBC # BLD AUTO: 4.08 MILLION/UL (ref 3.88–5.62)
SODIUM SERPL-SCNC: 130 MMOL/L (ref 135–147)
T WAVE AXIS: 163 DEGREES
T WAVE AXIS: 165 DEGREES
VENTRICULAR RATE: 142 BPM
VENTRICULAR RATE: 146 BPM
WBC # BLD AUTO: 14.3 THOUSAND/UL (ref 4.31–10.16)

## 2025-02-21 PROCEDURE — NC001 PR NO CHARGE: Performed by: RADIOLOGY

## 2025-02-21 PROCEDURE — 93010 ELECTROCARDIOGRAM REPORT: CPT | Performed by: INTERNAL MEDICINE

## 2025-02-21 PROCEDURE — 99232 SBSQ HOSP IP/OBS MODERATE 35: CPT | Performed by: HOSPITALIST

## 2025-02-21 PROCEDURE — 99152 MOD SED SAME PHYS/QHP 5/>YRS: CPT | Performed by: RADIOLOGY

## 2025-02-21 PROCEDURE — 99232 SBSQ HOSP IP/OBS MODERATE 35: CPT | Performed by: SURGERY

## 2025-02-21 PROCEDURE — 85025 COMPLETE CBC W/AUTO DIFF WBC: CPT

## 2025-02-21 PROCEDURE — 93005 ELECTROCARDIOGRAM TRACING: CPT

## 2025-02-21 PROCEDURE — 99152 MOD SED SAME PHYS/QHP 5/>YRS: CPT

## 2025-02-21 PROCEDURE — 99222 1ST HOSP IP/OBS MODERATE 55: CPT | Performed by: INTERNAL MEDICINE

## 2025-02-21 PROCEDURE — 76937 US GUIDE VASCULAR ACCESS: CPT

## 2025-02-21 PROCEDURE — 87075 CULTR BACTERIA EXCEPT BLOOD: CPT | Performed by: SURGERY

## 2025-02-21 PROCEDURE — 87070 CULTURE OTHR SPECIMN AEROBIC: CPT | Performed by: SURGERY

## 2025-02-21 PROCEDURE — 47490 INCISION OF GALLBLADDER: CPT

## 2025-02-21 PROCEDURE — 80053 COMPREHEN METABOLIC PANEL: CPT

## 2025-02-21 PROCEDURE — 87205 SMEAR GRAM STAIN: CPT | Performed by: SURGERY

## 2025-02-21 PROCEDURE — 87186 SC STD MICRODIL/AGAR DIL: CPT | Performed by: SURGERY

## 2025-02-21 PROCEDURE — C1769 GUIDE WIRE: HCPCS

## 2025-02-21 PROCEDURE — C1729 CATH, DRAINAGE: HCPCS

## 2025-02-21 PROCEDURE — 87077 CULTURE AEROBIC IDENTIFY: CPT | Performed by: SURGERY

## 2025-02-21 PROCEDURE — 47490 INCISION OF GALLBLADDER: CPT | Performed by: RADIOLOGY

## 2025-02-21 RX ORDER — FENTANYL CITRATE 50 UG/ML
INJECTION, SOLUTION INTRAMUSCULAR; INTRAVENOUS AS NEEDED
Status: COMPLETED | OUTPATIENT
Start: 2025-02-21 | End: 2025-02-21

## 2025-02-21 RX ORDER — METOPROLOL TARTRATE 1 MG/ML
5 INJECTION, SOLUTION INTRAVENOUS ONCE AS NEEDED
Status: COMPLETED | OUTPATIENT
Start: 2025-02-21 | End: 2025-02-21

## 2025-02-21 RX ORDER — MIDAZOLAM HYDROCHLORIDE 2 MG/2ML
INJECTION, SOLUTION INTRAMUSCULAR; INTRAVENOUS AS NEEDED
Status: COMPLETED | OUTPATIENT
Start: 2025-02-21 | End: 2025-02-21

## 2025-02-21 RX ORDER — METOPROLOL TARTRATE 1 MG/ML
5 INJECTION, SOLUTION INTRAVENOUS ONCE
Status: COMPLETED | OUTPATIENT
Start: 2025-02-21 | End: 2025-02-21

## 2025-02-21 RX ORDER — METOPROLOL TARTRATE 1 MG/ML
2.5 INJECTION, SOLUTION INTRAVENOUS EVERY 6 HOURS
Status: DISCONTINUED | OUTPATIENT
Start: 2025-02-21 | End: 2025-02-22

## 2025-02-21 RX ORDER — SODIUM CHLORIDE, SODIUM GLUCONATE, SODIUM ACETATE, POTASSIUM CHLORIDE, MAGNESIUM CHLORIDE, SODIUM PHOSPHATE, DIBASIC, AND POTASSIUM PHOSPHATE .53; .5; .37; .037; .03; .012; .00082 G/100ML; G/100ML; G/100ML; G/100ML; G/100ML; G/100ML; G/100ML
100 INJECTION, SOLUTION INTRAVENOUS CONTINUOUS
Status: DISPENSED | OUTPATIENT
Start: 2025-02-21 | End: 2025-02-22

## 2025-02-21 RX ORDER — SODIUM CHLORIDE 9 MG/ML
10 INJECTION, SOLUTION INTRAMUSCULAR; INTRAVENOUS; SUBCUTANEOUS DAILY
Qty: 300 ML | Refills: 3 | Status: SHIPPED | OUTPATIENT
Start: 2025-02-21 | End: 2025-06-21

## 2025-02-21 RX ORDER — METOPROLOL SUCCINATE 25 MG/1
25 TABLET, EXTENDED RELEASE ORAL 2 TIMES DAILY
Status: DISCONTINUED | OUTPATIENT
Start: 2025-02-21 | End: 2025-02-22

## 2025-02-21 RX ORDER — ONDANSETRON 4 MG/1
4 TABLET, ORALLY DISINTEGRATING ORAL EVERY 6 HOURS PRN
Status: DISCONTINUED | OUTPATIENT
Start: 2025-02-21 | End: 2025-02-23 | Stop reason: HOSPADM

## 2025-02-21 RX ORDER — LIDOCAINE WITH 8.4% SOD BICARB 0.9%(10ML)
SYRINGE (ML) INJECTION AS NEEDED
Status: COMPLETED | OUTPATIENT
Start: 2025-02-21 | End: 2025-02-21

## 2025-02-21 RX ORDER — METOPROLOL TARTRATE 1 MG/ML
5 INJECTION, SOLUTION INTRAVENOUS ONCE
Status: DISCONTINUED | OUTPATIENT
Start: 2025-02-21 | End: 2025-02-21

## 2025-02-21 RX ADMIN — HYDROCODONE BITARTRATE AND HOMATROPINE METHYLBROMIDE 5 ML: 1.5; 5 SOLUTION ORAL at 15:27

## 2025-02-21 RX ADMIN — ROPINIROLE 0.5 MG: 0.25 TABLET, FILM COATED ORAL at 09:49

## 2025-02-21 RX ADMIN — SODIUM CHLORIDE, SODIUM GLUCONATE, SODIUM ACETATE, POTASSIUM CHLORIDE, MAGNESIUM CHLORIDE, SODIUM PHOSPHATE, DIBASIC, AND POTASSIUM PHOSPHATE 100 ML/HR: .53; .5; .37; .037; .03; .012; .00082 INJECTION, SOLUTION INTRAVENOUS at 21:46

## 2025-02-21 RX ADMIN — GUAIFENESIN 1200 MG: 600 TABLET ORAL at 17:27

## 2025-02-21 RX ADMIN — GUAIFENESIN 1200 MG: 600 TABLET ORAL at 09:49

## 2025-02-21 RX ADMIN — ONDANSETRON 4 MG: 4 TABLET, ORALLY DISINTEGRATING ORAL at 17:38

## 2025-02-21 RX ADMIN — Medication 3 ML: at 13:45

## 2025-02-21 RX ADMIN — THIAMINE HYDROCHLORIDE: 100 INJECTION, SOLUTION INTRAMUSCULAR; INTRAVENOUS at 15:38

## 2025-02-21 RX ADMIN — METRONIDAZOLE 500 MG: 500 TABLET ORAL at 05:38

## 2025-02-21 RX ADMIN — CEFTRIAXONE SODIUM 2000 MG: 10 INJECTION, POWDER, FOR SOLUTION INTRAVENOUS at 17:27

## 2025-02-21 RX ADMIN — ROPINIROLE 0.5 MG: 0.25 TABLET, FILM COATED ORAL at 20:45

## 2025-02-21 RX ADMIN — ACETAMINOPHEN 650 MG: 325 TABLET, FILM COATED ORAL at 14:15

## 2025-02-21 RX ADMIN — MIDAZOLAM 0.5 MG: 1 INJECTION INTRAMUSCULAR; INTRAVENOUS at 13:38

## 2025-02-21 RX ADMIN — LIDOCAINE 1 PATCH: 50 PATCH CUTANEOUS at 09:49

## 2025-02-21 RX ADMIN — AMIODARONE HYDROCHLORIDE 0.5 MG/MIN: 50 INJECTION, SOLUTION INTRAVENOUS at 08:50

## 2025-02-21 RX ADMIN — IOHEXOL 10 ML: 350 INJECTION, SOLUTION INTRAVENOUS at 13:52

## 2025-02-21 RX ADMIN — BENZONATATE 200 MG: 100 CAPSULE ORAL at 09:49

## 2025-02-21 RX ADMIN — ROPINIROLE 0.5 MG: 0.25 TABLET, FILM COATED ORAL at 15:27

## 2025-02-21 RX ADMIN — METOPROLOL TARTRATE 5 MG: 1 INJECTION, SOLUTION INTRAVENOUS at 06:48

## 2025-02-21 RX ADMIN — FENTANYL CITRATE 25 MCG: 50 INJECTION INTRAMUSCULAR; INTRAVENOUS at 13:38

## 2025-02-21 RX ADMIN — METOPROLOL TARTRATE 5 MG: 1 INJECTION, SOLUTION INTRAVENOUS at 01:19

## 2025-02-21 RX ADMIN — METOROPROLOL TARTRATE 5 MG: 5 INJECTION, SOLUTION INTRAVENOUS at 07:48

## 2025-02-21 RX ADMIN — LEVOTHYROXINE SODIUM 25 MCG: 0.03 TABLET ORAL at 05:38

## 2025-02-21 RX ADMIN — PANTOPRAZOLE SODIUM 40 MG: 40 TABLET, DELAYED RELEASE ORAL at 05:38

## 2025-02-21 RX ADMIN — MULTIPLE VITAMINS W/ MINERALS TAB 1 TABLET: TAB ORAL at 09:49

## 2025-02-21 RX ADMIN — SODIUM CHLORIDE, SODIUM GLUCONATE, SODIUM ACETATE, POTASSIUM CHLORIDE, MAGNESIUM CHLORIDE, SODIUM PHOSPHATE, DIBASIC, AND POTASSIUM PHOSPHATE 100 ML/HR: .53; .5; .37; .037; .03; .012; .00082 INJECTION, SOLUTION INTRAVENOUS at 08:30

## 2025-02-21 RX ADMIN — BENZONATATE 200 MG: 100 CAPSULE ORAL at 15:27

## 2025-02-21 RX ADMIN — METOPROLOL TARTRATE 5 MG: 1 INJECTION, SOLUTION INTRAVENOUS at 07:08

## 2025-02-21 RX ADMIN — HEPARIN SODIUM 5000 UNITS: 5000 INJECTION INTRAVENOUS; SUBCUTANEOUS at 20:46

## 2025-02-21 RX ADMIN — METOPROLOL TARTRATE 2.5 MG: 1 INJECTION, SOLUTION INTRAVENOUS at 13:03

## 2025-02-21 RX ADMIN — METRONIDAZOLE 500 MG: 500 TABLET ORAL at 20:45

## 2025-02-21 RX ADMIN — BENZONATATE 200 MG: 100 CAPSULE ORAL at 20:45

## 2025-02-21 RX ADMIN — Medication 1000 UNITS: at 09:49

## 2025-02-21 RX ADMIN — METRONIDAZOLE 500 MG: 500 TABLET ORAL at 13:03

## 2025-02-21 RX ADMIN — METOROPROLOL TARTRATE 5 MG: 5 INJECTION, SOLUTION INTRAVENOUS at 02:02

## 2025-02-21 RX ADMIN — METOPROLOL TARTRATE 2.5 MG: 1 INJECTION, SOLUTION INTRAVENOUS at 17:28

## 2025-02-21 NOTE — PROGRESS NOTES
Progress Note - Surgery-General   Name: Devin Cutler 80 y.o. male I MRN: [id number]  Unit/Bed#: S -01 I Date of Admission: 2/18/2025   Date of Service: 2/21/2025 I Hospital Day: 3     Assessment & Plan  Sepsis (HCC)  See below  Hyperbilirubinemia  Tbili elevated to 3.5 on admission, down-trending, now 1.04  Likely in the setting of acute cholecystitis  RUQ us: sludge with wall thickening. Some gravel might be present. No common bile duct dilation.    See below  Trend LFTs  Cholecystitis  80-year-old male with imaging evidence of acute cholecystitis complicated by possible pericholecystic abscess in liver, clinically improving    Afebrile, intermittently tachycardic requiring metoprolol    Urine output 675    WBC 14.3 from 16.8  Hemoglobin 12.4 from 12.5  Creatinine 1.08 from 1.02  Bilirubin 0.85 from 1.04    Plan  -N.p.o. at midnight  -Follow-up IR for percutaneous cholecystostomy tube and possible hepatic abscess drainage  -Continue IV antibiotics  -Continue IV fluids  -Monitor and replete electrolytes as needed  -Multimodal pain regimen  -Encourage ambulation/out of bed, 3 times daily  -Encourage incentive spirometer use  -PT OT recommending level 3 rehabilitation  -Rest of care per primary team      Subjective   Patient seen and examined at bedside.  Patient mainly concerned regarding cough at this time.  Patient denies abdominal pain, nausea, vomiting, fevers, chills, shortness of breath.    Objective :  Temp:  [98.5 °F (36.9 °C)-100 °F (37.8 °C)] 99.2 °F (37.3 °C)  HR:  [] 143  BP: (103-130)/(58-77) 119/75  Resp:  [16-18] 16  SpO2:  [92 %-94 %] 94 %  O2 Device: None (Room air)    I/O         02/19 0701 02/20 0700 02/20 0701 02/21 0700    P.O. 640 720    I.V. (mL/kg)      IV Piggyback 100     Total Intake(mL/kg) 740 (8.7) 720 (8.4)    Urine (mL/kg/hr) 950 (0.5) 300 (0.2)    Total Output 950 300    Net -210 +420                  Physical Exam  Vitals and nursing note reviewed.   Constitutional:        General: He is not in acute distress.     Appearance: Normal appearance. He is normal weight. He is not ill-appearing or toxic-appearing.   HENT:      Head: Normocephalic and atraumatic.   Eyes:      General: No scleral icterus.     Conjunctiva/sclera: Conjunctivae normal.   Cardiovascular:      Rate and Rhythm: Normal rate.      Pulses: Normal pulses.   Pulmonary:      Effort: Pulmonary effort is normal. No respiratory distress.   Abdominal:      General: Abdomen is flat. There is no distension.      Tenderness: There is no abdominal tenderness.   Musculoskeletal:      Right lower leg: No edema.      Left lower leg: No edema.   Skin:     General: Skin is warm.      Capillary Refill: Capillary refill takes less than 2 seconds.   Neurological:      Mental Status: He is alert.   Psychiatric:         Mood and Affect: Mood normal.         Lab Results: I have reviewed the following results:  Recent Labs     02/19/25  0542 02/19/25  0656 02/19/25  0751 02/19/25  0836 02/19/25  1928 02/19/25  2105 02/19/25  2308 02/20/25  0629 02/20/25  1549 02/21/25  0552   WBC  --   --   --  24.98*  --   --   --    < >  --  14.30*   HGB  --   --   --  12.4  --   --   --    < >  --  12.4   HCT  --   --   --  35.9*  --   --   --    < >  --  36.0*   PLT  --   --   --  209  --   --   --    < >  --  144*   BANDSPCT  --   --   --  8  --   --   --   --   --   --    SODIUM 130* 131*  --   --  133*  --   --    < > 132*  --    K 5.4* 3.5  --   --  3.8  --   --    < > 3.9  --    CL 98 99  --   --  97  --   --    < > 99  --    CO2 24 23  --   --  23  --   --    < > 26  --    BUN 17 17  --   --  19  --   --    < > 22  --    CREATININE 1.07 1.01  --   --  1.20  --   --    < > 1.08  --    GLUC 78 87  --   --  89  --   --    < > 136  --    CAIONIZED  --   --   --   --  1.10*  --   --   --   --   --    MG  --   --   --   --  1.7*  --   --   --   --   --    AST 46*  --   --   --   --   --   --    < > 42*  --    ALT 23  --   --   --   --   --   --     < > 31  --    ALB 2.9*  --   --   --   --   --   --    < > 2.8*  --    TBILI 2.58*  --   --   --   --   --   --    < > 0.85  --    ALKPHOS 84  --   --   --   --   --   --    < > 83  --    INR 1.36*  --   --   --   --   --   --   --   --   --    HSTNI0  --   --   --   --   --  98*  --   --   --   --    HSTNI2  --   --   --   --   --   --  101*  --   --   --    BNP  --   --  463*  --   --   --   --   --   --   --    LACTICACID  --  2.2*  --   --   --   --   --   --   --   --     < > = values in this interval not displayed.       Imaging Results Review: No pertinent imaging studies reviewed.  Other Study Results Review: No additional pertinent studies reviewed.    VTE Pharmacologic Prophylaxis: VTE covered by:  heparin (porcine), Subcutaneous, 5,000 Units at 02/20/25 2113      VTE Mechanical Prophylaxis: sequential compression device

## 2025-02-21 NOTE — ASSESSMENT & PLAN NOTE
Wesley was admitted with syncope associated with hypotension, leukocytosis and sepsis, found to have acute cholecystitis.  He is also being treated for pneumonia/bronchitis.  He is hemodynamically improving.

## 2025-02-21 NOTE — PROGRESS NOTES
Progress Note - Hospitalist   Name: Devin Cutler 80 y.o. male I MRN: [id number]  Unit/Bed#: S -01 I Date of Admission: 2/18/2025   Date of Service: 2/21/2025 I Hospital Day: 3    Assessment & Plan  Sepsis (HCC)  WBC   Date Value Ref Range Status   02/21/2025 14.30 (H) 4.31 - 10.16 Thousand/uL Final   02/20/2025 16.79 (H) 4.31 - 10.16 Thousand/uL Final   02/19/2025 24.98 (H) 4.31 - 10.16 Thousand/uL Final     LACTIC ACID   Date Value Ref Range Status   02/19/2025 2.2 (H) 0.5 - 2.0 mmol/L Final   02/19/2025 3.1 (H) 0.5 - 2.0 mmol/L Final     Lab Results   Component Value Date    BLOODCX Escherichia coli (A) 02/18/2025    BLOODCX Escherichia coli (A) 02/18/2025     CT A/P: Over distended gallbladder reflecting Paula cystitis.  Loculated hypodense lesion in adjacent liver not fully characterized by concerning for pericholecystic abscess  CXR negative  CT head showed possible acute right maxillary sinusitis     Assessment: Blood culture growing E. coli suspect translocation from cholecystitis. Planned for IR drainage tentatively at 4PM if hr under control       Plan:  Continue ceftriaxone 2g q24h d4  Flagyl 500 mg tid d3  Surgery consult for abscess and cholecystitis  URI symptoms  Reports productive cough over the past two weeks with significant worsening over the last two days with fatigue and poor PO intake with known sick contacts  CT head noted right maxillary sinus may represent acute sinusitis  CXR negative, however increased rales in RLL on exam. Suspect CXR did not show RLL consolidation due to dehydration.  Recent Labs     02/18/25  2138 02/19/25  0506   PROCALCITONI 1.21* 4.64*       Assessment: Possible viral infection followed by superimposed bacterial infection. Treating as community acquired PNA    Plan:  Continue ceftriaxone q24h d2  Mucinex 1200mg BID   [place] Perles 100mg TID prn for cough  Incentive Spirometry  Continue to monitor respiratory   A-fib (HCC) with rvr  Assessment: Suspect  triggered by active pulmonary illness and being septic.  Currently converted back to sinus rhythm on amiodarone drip. Will cross with home metoprolol, initiation of AC will depends on if acute intervention of cholecystitis is indicated.     - resumed amio drip   - cardiology consult  - ac timing tbd with procedure.   Cholecystitis  CT: Overdistended gallbladder with mild wall thickening and pericholecystic fluid, likely reflecting cholecystitis.  Lobulated hypodense lesion in the adjacent liver, not fully characterized but highly concerning for a pericholecystic abscess.     - continue ctx and flagyl  - IR   - surg consult  Elevated serum creatinine  Recent Labs     02/19/25  1928 02/20/25  0629 02/20/25  1549   CREATININE 1.20 1.02 1.08   EGFR 57 69 64     Estimated Creatinine Clearance: 51.9 mL/min (by C-G formula based on SCr of 1.08 mg/dL).    POA: Cr: 1.31 (baseline 0.9-1.0), GFR: 51 (baseline 80s)  Likely prerenal 2/2 dehydration/hypotension 2/2 sepsis 2/2 PNA  S/p 1L NSS IVF bolus    Assessment: As of the date of this note, creatinine level has returned near baseline.    Plan:  Monitor BMP  Avoid hypoperfusion of the kidneys, minimize nephrotoxins  Held Celebrex  Hyperbilirubinemia  Recent Labs     02/19/25  0542 02/20/25  0629 02/20/25  1549   AST 46* 52* 42*   ALT 23 32 31   ALKPHOS 84 83 83   TBILI 2.58* 1.04* 0.85   BILIDIR 0.54*  --   --      UA: positive for urobilinogen  History of drinking about 9 drinks per week (3 beers three times per week while playing pole)  No abdominal pain, N/V. Abdomen nontender on exam. Suspect possibly related to sepsis. Hemoglobin normal, low suspicion due to hemolytic anemia at this time.    Suspect secondary to sepsis and cholecystitis     Plan:  Continue to trend LFTs  Surgery consult    Syncopal episodes  Presented to the ED after syncopal episode x2 while playing pool. Hit head on fall.  Trauma work up negative, including negative CT head  Meet Sepsis criteria with  tachypnea, hypotension, leukocytosis, and lactic acidosis with suspected PNA etiology. Initial BP 72/40, responded well to IVF.  Reports productive cough over the past two weeks with significant worsening over the last two days with fatigue and poor PO intake with known sick contacts.  EKG NSR, rate 66  Suspect syncopal episodes 2/2 sepsis 2/2 PNA    Plan:  Treat problem above  Essential hypertension  Initial Blood pressure on admission 72/40. Suspect dehydration/hypotension 2/2 sepsis 2/2 PNA.  S/p 1L NSS IVF bolus  Home regimen: Metoprolol Tartrate 25mg BID    Plan:  Hold anti hypertensives for now, given sbp in acceptable range     Anxiety  History of anxiety  Continue Xanax 0.5mg qhs prn  GERD (gastroesophageal reflux disease)  Home regimen: lansoprazole 30mg  Continue Pantoprazole 40mg PO while inpatient  RLS (restless legs syndrome)  Continue Ropinirole 0.5mg TID prn  Hypothyroidism due to acquired atrophy of thyroid  Continue Synthroid 25mg    Right hip pain  History of Right hip pain  Hold Celebrex and Voltaren gel due to RANDAL  Continue lidocaine patch  Continue Tyelnol prn for pain    VTE Pharmacologic Prophylaxis: VTE Score: 6 High Risk (Score >/= 5) - Pharmacological DVT Prophylaxis Ordered: heparin. Sequential Compression Devices Ordered.    Mobility:   Basic Mobility Inpatient Raw Score: 19  JH-HLM Goal: 6: Walk 10 steps or more  JH-HLM Achieved: 7: Walk 25 feet or more  JH-HLM Goal achieved. Continue to encourage appropriate mobility.    Patient Centered Rounds: I performed bedside rounds with nursing staff today.   Discussions with Specialists or Other Care Team Provider: nursing    Education and Discussions with Family / Patient: Updated  (daughter) via phone.    Current Length of Stay: 3 day(s)  Current Patient Status: Inpatient   Certification Statement: The patient will continue to require additional inpatient hospital stay due to sepsis and cholesystitis  Discharge Plan: Anticipate  discharge in 48 hrs to home.    Code Status: Level 3 - DNAR and DNI    Subjective   Overnight: Patient was noted to be in SVT, converted to A-fib with RVR, given metoprolol and started on amiodarone drip.     Complaints: No complaints.     Patient denies Headache, Fever, Chills, Chest Pain, Shortness of breath, Nausea, or Vomiting, Abdominal Pain, Diarrhea, Swellings, Dysuria, Urgency, frequency, or incontinence, Hematuria, new or worsening anxiety or depression    Diet: Diet NPO; Sips with meds   Bowel regimen:       DVT/VTE Prophylaxis: VTE covered by:  heparin (porcine), Subcutaneous, 5,000 Units at 02/20/25 2115         Objective :  Temp:  [98.5 °F (36.9 °C)-99.3 °F (37.4 °C)] 98.5 °F (36.9 °C)  HR:  [] 126  BP: (100-130)/(58-86) 116/74  Resp:  [16-18] 18  SpO2:  [92 %-95 %] 95 %  O2 Device: None (Room air)    Body mass index is 26.73 kg/m².     Input and Output Summary (last 24 hours):     Intake/Output Summary (Last 24 hours) at 2/21/2025 1142  Last data filed at 2/21/2025 0844  Gross per 24 hour   Intake 505 ml   Output 1125 ml   Net -620 ml       Physical Exam  Vitals and nursing note reviewed.   Constitutional:       General: He is not in acute distress.     Appearance: He is well-developed.   HENT:      Head: Normocephalic and atraumatic.   Eyes:      Conjunctiva/sclera: Conjunctivae normal.   Cardiovascular:      Rate and Rhythm: Normal rate and regular rhythm.      Heart sounds: No murmur heard.  Pulmonary:      Effort: Pulmonary effort is normal. No respiratory distress.      Breath sounds: Normal breath sounds.   Abdominal:      Palpations: Abdomen is soft.      Tenderness: There is no abdominal tenderness.   Musculoskeletal:         General: No swelling.      Cervical back: Neck supple.   Skin:     General: Skin is warm and dry.      Capillary Refill: Capillary refill takes less than 2 seconds.   Neurological:      Mental Status: He is alert.   Psychiatric:         Mood and Affect: Mood normal.             Lines/Drains:        Telemetry:  Telemetry Orders (From admission, onward)               24 Hour Telemetry Monitoring  Continuous x 24 Hours (Telem)        Expiring   Question:  Reason for 24 Hour Telemetry  Answer:  Syncope suspected to be cardiac in origin                     Telemetry Reviewed: Normal Sinus Rhythm after midnight  Indication for Continued Telemetry Use: Arrthymias requiring medical therapy               Lab Results: I have reviewed the following results:   Results from last 7 days   Lab Units 02/21/25  0552 02/20/25  0629 02/19/25  0836   WBC Thousand/uL 14.30*   < > 24.98*   HEMOGLOBIN g/dL 12.4   < > 12.4   HEMATOCRIT % 36.0*   < > 35.9*   PLATELETS Thousands/uL 144*   < > 209   BANDS PCT %  --   --  8   SEGS PCT % 89*   < >  --    LYMPHO PCT % 5*   < > 2*   MONO PCT % 5   < > 3*   EOS PCT % 0   < > 0    < > = values in this interval not displayed.     Results from last 7 days   Lab Units 02/20/25  1549   SODIUM mmol/L 132*   POTASSIUM mmol/L 3.9   CHLORIDE mmol/L 99   CO2 mmol/L 26   BUN mg/dL 22   CREATININE mg/dL 1.08   ANION GAP mmol/L 7   CALCIUM mg/dL 8.5   ALBUMIN g/dL 2.8*   TOTAL BILIRUBIN mg/dL 0.85   ALK PHOS U/L 83   ALT U/L 31   AST U/L 42*   GLUCOSE RANDOM mg/dL 136     Results from last 7 days   Lab Units 02/19/25  0542   INR  1.36*     Results from last 7 days   Lab Units 02/19/25  1922   POC GLUCOSE mg/dl 82         Results from last 7 days   Lab Units 02/19/25  0656 02/19/25  0506 02/18/25  2359 02/18/25  2138   LACTIC ACID mmol/L 2.2* 3.1* 2.1* 2.5*   PROCALCITONIN ng/ml  --  4.64*  --  1.21*       Recent Cultures (last 7 days):   Results from last 7 days   Lab Units 02/19/25  0105 02/18/25  2145 02/18/25  2138   BLOOD CULTURE   --  Escherichia coli* Escherichia coli*   GRAM STAIN RESULT   --  Gram negative rods* Gram negative rods*   LEGIONELLA URINARY ANTIGEN  Negative  --   --              Last 24 Hours Medication List:     Current Facility-Administered  Medications:     acetaminophen (TYLENOL) tablet 650 mg, Q4H PRN    ALPRAZolam (XANAX) tablet 0.5 mg, HS PRN    amiodarone (CORDARONE) 900 mg in dextrose 5 % 500 mL infusion, Continuous, Last Rate: 0.5 mg/min (02/21/25 0850)    benzonatate (TESSALON PERLES) capsule 200 mg, TID    ceftriaxone (ROCEPHIN) 2 g/50 mL in dextrose IVPB, Q24H, Last Rate: 2,000 mg (02/20/25 1736)    Cholecalciferol (VITAMIN D3) tablet 1,000 Units, Daily    [Held by provider] Diclofenac Sodium (VOLTAREN) 1 % topical gel 2 g, 4x Daily    folic acid 1 mg, thiamine (VITAMIN B1) 100 mg in sodium chloride 0.9 % 100 mL IV piggyback, Daily, Last Rate: 0 mL/hr at 02/19/25 0913    guaiFENesin (MUCINEX) 12 hr tablet 1,200 mg, BID    heparin (porcine) subcutaneous injection 5,000 Units, Q8H LITO **AND** Platelet count, Once    HYDROcodone Bit-Homatrop MBr (HYCODAN) oral syrup 5 mL, Q4H PRN    levothyroxine tablet 25 mcg, Early Morning    lidocaine (LIDODERM) 5 % patch 1 patch, Daily    [Held by provider] metoprolol succinate (TOPROL-XL) 24 hr tablet 25 mg, BID    metroNIDAZOLE (FLAGYL) tablet 500 mg, Q8H LITO    multi-electrolyte (PLASMALYTE-A/ISOLYTE-S PH 7.4) IV solution, Continuous, Last Rate: 100 mL/hr (02/21/25 0830)    multivitamin-minerals (CENTRUM) tablet 1 tablet, Daily    pantoprazole (PROTONIX) EC tablet 40 mg, Early Morning    rOPINIRole (REQUIP) tablet 0.5 mg, TID    Administrative Statements   Today, Patient Was Seen By: Juanita Gonzalez MD      **Please Note: This note may have been constructed using a voice recognition system.**

## 2025-02-21 NOTE — DISCHARGE INSTRUCTIONS
Cholecystostomy Tube Care     WHAT YOU NEED TO KNOW:   A cholecystostomy tube is a catheter (thin plastic tube) that is inserted through your skin and into your gallbladder. The cholecystostomy tube drains bile from your gallbladder into a collecting bag outside your body. You may need a cholecystostomy tube when something is blocking the normal flow of bile. A cholecystostomy tube may be used for a short or a long period of time. The cholecystostomy tube comes out of your abdomen, so you will need someone to help care for your cholecystostomy tube.    DISCHARGE INSTRUCTIONS:     Resume your normal diet. Small sips of flat soda will help with mild nausea.     How to clean the skin around the cholecystostomy tube and change the bandage:  Since the cholecystostomy tube comes out of your abdomen, you may not be able to care for it by yourself. Ask someone to follow the general directions below to check and care for your cholecystostomy tube.   Gather the items you will need.          Disposable (single use) under-pad, and a clean washcloth  Plain soap, warm water, and new medical gloves  Sterile gauze bandages  Clear adhesive dressing or medical tape  Skin barrier  Protective skin film  Trash bag  Remove the old bandage, and check the tube entry site.    Have the patient lie on his side with the cholecystostomy tube entry site facing up. Place the under-pad where it will catch drainage as you are working with the cholecystostomy tube.   Wash your hands with soap and water. Put on new medical gloves.  Gently remove the old bandage, without pulling on the tube. Do this by holding the skin beside the tube with one hand. With the other hand, gently remove sticky tape and the skin barrier by pulling in the same direction as hair growth. Do not touch the side of the bandage that is placed over or around the tube. Throw the bandage and skin barrier away in a trash bag.  Look for signs of infection, such as skin redness  and swelling. Report any skin changes to healthcare providers.  Clean the tube entry site.    Hold the tube in place to keep it from being pulled out while you are cleaning around it.  You will need to clean the area twice. For the first cleaning, wet a new gauze bandage with soap and water.  Begin at the entry site of the tube. Wipe the skin in circles, moving away from the entry site. Remove blood and any other material with the gauze. Do this as often as needed. Use a new gauze bandage each time you clean the area, moving away from the entry site.   For the second cleaning, wet a new gauze bandage with water. Begin at the entry site of the tube. Wipe the skin in circles, moving away from the entry site. Use a new gauze bandage each time you clean the area, moving away from the entry site.   Gently pat the skin with a clean washcloth to dry it.    Apply the skin barrier and bandages.    Roll up a bandage to make it thick, and place it under  the place where the tube enters the skin. Place it to support the tube, and stop it from kinking or bending. Tape the bandage in place, and apply more bandages if directed by a healthcare provider.   Bring the tubing forward to the front and tape it to the skin. Do not stretch the tube tight, because this may pull the cholecystostomy tube out.  How often to change the bandage.  Change the bandage around the tube, every other day. If your bandages  get dirty or wet, change them right away, and as often as needed. If your cholecystostomy tube is to be used for a long period of time, the tube needs to be changed every 2 to 3 months. Healthcare providers will tell you when you need to make an appointment to have your tube changed.     How to care for the bile drainage bag:   Ask if you need to measure and write down how much bile is in the bag before you empty it. Drain bile out of the drainage bag when it is ½ to ? full. Open the spout at the bottom of the bag to empty the bile  into the toilet.   You may need to detach the drainage bag from the cholecystostomy tube to change it.. If so, attach a new drainage bag tightly to the cholecystostomy tube.     How to prevent problems with your cholecystostomy tube:   Change bandages, directed.  This helps to prevent infection. Throw away or clean your drainage bag as directed by your healthcare provider.    Wipe the connecting ends of the drainage bag with alcohol before you reconnect the bag to the tube.  This helps prevent infection.     Keep the tube taped to your skin and connected to a drainage bag placed below the level of your gallbladders.  This helps prevent bile from abdomening up into your gallbladders. You may wear a small drainage bag strapped to your leg to let you move around more easily.    Check the catheter to be sure it is in place after you change your clothes or do other activities.  Do not wear tight clothing over the tube. Place the tubing over your thigh rather than under it when you are sitting down. Be sure that nothing is pulling on the cholecystostomy tube when you move around.    Change positions if you see little or no bile in your drainage bag.  Check to see if the bile tube is twisted or bent. Be sure that you are not sitting or lying on the tube. If there are no kinks and there is little or no bile in the drainage bag, tell your healthcare provider.    Flush out the tube as directed. Some tubes get flushed one time a day with 10 mls of NSS You will be given a prescription for the flushes.  To flush the cholecystostomy tube, clean both connections with alcohol swap. Twist off the drainage bag tube and twist the saline syringe into the cholecystostomy tube and flush briskly. Remove the syringe and twist the drainage bag tube abdomen into the cholecystostomy tube.  Keep the site covered while you shower.  Tape a piece of clear adhesive plastic over the dressing to keep it dry while you shower. Do not take tub  baths.    Contact Interventional Radiology at [phone] ([facility]EL[facility] PATIENTS: Contact Interventional Radiology at [phone]) (NIKKO PATIENTS: Contact Interventional Radiology at [phone]) if:  The skin around the cholecystostomy tube is red, swollen, itches, or has a rash.   You have a fever.  You have lower abdomen or hip pain.  There are changes in how your bile looks or smells.  You have little or no bile draining from the cholecystostomy tube.   You have nausea and are vomiting.  The black mars on your tube has moved, or the tube is longer than when it was put in.   You have questions or concerns about your condition or care.  The cholecystostomy tube comes out completely.   There is blood, pus, or a bad smell coming from the place where the tube enters your skin.  bile is leaking around the tube.        The following pharmacies carry the flush syringes.       [facility]                     [facility]                         [facility]       [address]                     [address] [facility]                    [phone]  [place]                       [place]  Phone [phone]            Phone [phone]                 [facility]                                                                                                   [phone]  [facility]             [facility]                             [facility] [address]                      [address] [facility] [facility]   [place]                                 [phone]  Phone [phone]            Phone [phone]    [facility]                                                                         [facility] [phone]  Jenna [place]fonzo [place].  [place]   Phone [phone]

## 2025-02-21 NOTE — ASSESSMENT & PLAN NOTE
He was on metoprolol for this as an outpatient, which was originally started for palpitations many years ago.

## 2025-02-21 NOTE — PLAN OF CARE
Problem: Potential for Falls  Goal: Patient will remain free of falls  Description: INTERVENTIONS:  - Educate patient/family on patient safety including physical limitations  - Instruct patient to call for assistance with activity   - Consult OT/PT to assist with strengthening/mobility   - Keep Call bell within reach  - Keep bed low and locked with side rails adjusted as appropriate  - Keep care items and personal belongings within reach  - Initiate and maintain comfort rounds  - Make Fall Risk Sign visible to staff  - Offer Toileting every  Hours, in advance of need  - Initiate/Maintain alarm  - Obtain necessary fall risk management equipment:   - Apply yellow socks and bracelet for high fall risk patients  - Consider moving patient to room near nurses station  Outcome: Progressing     Problem: PAIN - ADULT  Goal: Verbalizes/displays adequate comfort level or baseline comfort level  Description: Interventions:  - Encourage patient to monitor pain and request assistance  - Assess pain using appropriate pain scale  - Administer analgesics based on type and severity of pain and evaluate response  - Implement non-pharmacological measures as appropriate and evaluate response  - Consider cultural and social influences on pain and pain management  - Notify physician/advanced practitioner if interventions unsuccessful or patient reports new pain  Outcome: Progressing     Problem: INFECTION - ADULT  Goal: Absence or prevention of progression during hospitalization  Description: INTERVENTIONS:  - Assess and monitor for signs and symptoms of infection  - Monitor lab/diagnostic results  - Monitor all insertion sites, i.e. indwelling lines, tubes, and drains  - Monitor endotracheal if appropriate and nasal secretions for changes in amount and color  - [place] appropriate cooling/warming therapies per order  - Administer medications as ordered  - Instruct and encourage patient and family to use good hand hygiene technique  -  Identify and instruct in appropriate isolation precautions for identified infection/condition  Outcome: Progressing  Goal: Absence of fever/infection during neutropenic period  Description: INTERVENTIONS:  - Monitor WBC    Outcome: Progressing

## 2025-02-21 NOTE — ASSESSMENT & PLAN NOTE
80-year-old male with imaging evidence of acute cholecystitis complicated by possible pericholecystic abscess in liver, clinically improving    Afebrile, intermittently tachycardic requiring metoprolol    Urine output 675    WBC 14.3 from 16.8  Hemoglobin 12.4 from 12.5  Creatinine 1.08 from 1.02  Bilirubin 0.85 from 1.04    Plan  -N.p.o. at midnight  -Follow-up IR for percutaneous cholecystostomy tube and possible hepatic abscess drainage  -Continue IV antibiotics  -Continue IV fluids  -Monitor and replete electrolytes as needed  -Multimodal pain regimen  -Encourage ambulation/out of bed, 3 times daily  -Encourage incentive spirometer use  -PT OT recommending level 3 rehabilitation  -Rest of care per primary team

## 2025-02-21 NOTE — CONSULTS
Consultation - Cardiology   Devin Cutler 80 y.o. male MRN: [id number]  Unit/Bed#: S -01 Encounter: [id number]      Assessment & Plan  Sepsis ([facility])  Wesley was admitted with syncope associated with hypotension, leukocytosis and sepsis, found to have acute cholecystitis.  He is also being treated for pneumonia/bronchitis.  He is hemodynamically improving.  A-fib (HCC) with rvr  This occurred after admission in the setting of his sepsis and acute cholecystitis.  He has gone in and out of atrial fibrillation, but currently in rapid atrial fibrillation.  IV amiodarone was started.  If blood pressure allows I would initiate around-the-clock beta-blocker as well.  Essential hypertension  He was on metoprolol for this as an outpatient, which was originally started for palpitations many years ago.  Right hip pain  Secondary to his syncope and fall.  Elevated serum creatinine  Associated with hypotension of sepsis.  Syncopal episodes  Associated with his hypotension, sepsis and acute cholecystitis.  Cholecystitis  Antibiotics per primary team.  They are planning a IR guided cholecystostomy tube.       History of Present Illness   Physician Requesting Consult: Nickolas Sargent MD  Reason for Consult / Principal Problem: AF with RVR    HPI: Devin Cutler is a 80 y.o. year old male who carries no prior cardiac history, who presents to the emergency room after an episode of syncope.  History noted to have a productive cough over the last 2 weeks which had improved initially, but then over the last 2 days it worsened.  He also had worsening fatigue and poor p.o. intake.  When he presented he was hypotensive with leukocytosis and was started on treatment for pneumonia/bronchitis.  He was also in mild acute kidney injury.  In the setting of this he was also found to have hyperbilirubinemia and a right upper quadrant ultrasound showed cholecystitis, which the admitting team feels that this is the source of his sepsis.    Then  overnight it was noted the patient went into rapid atrial fibrillation.  Blood pressure had improved with fluid resuscitation since admission, and rapid atrial fibrillation did not affect his hemodynamics.  He also remained asymptomatic.  Cardiology was called overnight and instructed the admitting team to start IV amiodarone.  This was held initially as he went back into sinus rhythm on his own, but then went back into rapid atrial fibrillation and at that point amiodarone was started.    Wesley currently feels clinically better.  He denies any cardiac symptoms.  No chest pain or shortness of breath.  No signs or symptoms of CHF.  He denies palpitations.  No lightheadedness since his admission.  He does give me a history of having palpitations in the remote past, close to 30 years ago in which she was started on beta-blocker.      Inpatient consult to Cardiology  Consult performed by: Mello Ness MD  Consult ordered by: Brett Palacio MD          Review of Systems: Please refer to the HPI for all cardiac and pulmonary review of systems.  Other than the presenting symptoms of lightheadedness with syncope and his worsening cough, he also has been battling worsening fatigue and poor p.o. intake.  All other 10 systems were reviewed and are negative.    Historical Information   Past Medical History:   Diagnosis Date    GERD (gastroesophageal reflux disease)     Right hip pain 4/22/2021     History reviewed. No pertinent surgical history.  Social History     Substance and Sexual Activity   Alcohol Use Yes    Comment: occasional     Social History     Substance and Sexual Activity   Drug Use Never    Comment: No use     Social History     Tobacco Use   Smoking Status Former    Types: Cigarettes    Passive exposure: Past   Smokeless Tobacco Never   Tobacco Comments    per Tamia     Family History: Family history non-contributory    Meds/Allergies   all current active meds have been reviewed  No Known  Allergies      Current Facility-Administered Medications:     acetaminophen (TYLENOL) tablet 650 mg, 650 mg, Oral, Q4H PRN, Estela Carlson DO, 650 mg at 02/19/25 1911    ALPRAZolam (XANAX) tablet 0.5 mg, 0.5 mg, Oral, HS PRN, Estela Carlson DO, 0.5 mg at 02/19/25 2151    amiodarone (CORDARONE) 900 mg in dextrose 5 % 500 mL infusion, 0.5 mg/min, Intravenous, Continuous, Brett Palacio MD, Last Rate: 16.7 mL/hr at 02/21/25 0850, 0.5 mg/min at 02/21/25 0850    benzonatate (TESSALON PERLES) capsule 200 mg, 200 mg, Oral, TID, Brett Palacio MD, 200 mg at 02/21/25 0949    ceftriaxone (ROCEPHIN) 2 g/50 mL in dextrose IVPB, 2,000 mg, Intravenous, Q24H, Juanita Gonzalez MD, Last Rate: 100 mL/hr at 02/20/25 1736, 2,000 mg at 02/20/25 1736    Cholecalciferol (VITAMIN D3) tablet 1,000 Units, 1,000 Units, Oral, Daily, Estela Carlson DO, 1,000 Units at 02/21/25 0949    [Held by provider] Diclofenac Sodium (VOLTAREN) 1 % topical gel 2 g, 2 g, Topical, 4x Daily, Estela Carlson DO    folic acid 1 mg, thiamine (VITAMIN B1) 100 mg in sodium chloride 0.9 % 100 mL IV piggyback, , Intravenous, Daily, Juanita Gonzalez MD, Last Rate: 0 mL/hr at 02/19/25 0913, New Bag at 02/20/25 0900    guaiFENesin (MUCINEX) 12 hr tablet 1,200 mg, 1,200 mg, Oral, BID, Estela Carlson DO, 1,200 mg at 02/21/25 0949    heparin (porcine) subcutaneous injection 5,000 Units, 5,000 Units, Subcutaneous, Q8H LITO, 5,000 Units at 02/20/25 2115 **AND** Platelet count, , , Once, Estela Carlson DO    HYDROcodone Bit-Homatrop MBr (HYCODAN) oral syrup 5 mL, 5 mL, Oral, Q4H PRN, Brett Palacio MD    levothyroxine tablet 25 mcg, 25 mcg, Oral, Early Morning, Estela Carlson DO, 25 mcg at 02/21/25 0538    lidocaine (LIDODERM) 5 % patch 1 patch, 1 patch, Topical, Daily, Estela Carlson DO, 1 patch at 02/21/25 0949    [Held by provider] metoprolol succinate (TOPROL-XL) 24 hr tablet 25 mg, 25 mg, Oral, BID, Brett  "MD aHkeem    metroNIDAZOLE (FLAGYL) tablet 500 mg, 500 mg, Oral, Q8H LITO, Nickolas Sargent MD, 500 mg at 02/21/25 0538    multi-electrolyte (PLASMALYTE-A/ISOLYTE-S PH 7.4) IV solution, 100 mL/hr, Intravenous, Continuous, Brett Palacio MD, Last Rate: 100 mL/hr at 02/21/25 0830, 100 mL/hr at 02/21/25 0830    multivitamin-minerals (CENTRUM) tablet 1 tablet, 1 tablet, Oral, Daily, Estela Alondra Brouse, DO, 1 tablet at 02/21/25 0949    pantoprazole (PROTONIX) EC tablet 40 mg, 40 mg, Oral, Early Morning, Estela Alondra Brouse, DO, 40 mg at 02/21/25 0538    rOPINIRole (REQUIP) tablet 0.5 mg, 0.5 mg, Oral, TID, Estela Alondra Brouse, DO, 0.5 mg at 02/21/25 0949    Objective   Vitals: Blood pressure 116/74, pulse (!) 126, temperature 98.5 °F (36.9 °C), temperature source Oral, resp. rate 18, height 5' 7\" (1.702 m), weight 77.4 kg (170 lb 10.2 oz), SpO2 95%., Body mass index is 26.73 kg/m².,   Orthostatic Blood Pressures      Flowsheet Row Most Recent Value   Blood Pressure 116/74 filed at 02/21/2025 1030   Patient Position - Orthostatic VS Lying filed at 02/21/2025 1030              Intake/Output Summary (Last 24 hours) at 2/21/2025 1157  Last data filed at 2/21/2025 0844  Gross per 24 hour   Intake 505 ml   Output 1125 ml   Net -620 ml         Physical Exam:  GEN: Devin Cutler appears well, alert and oriented x 3, pleasant and cooperative   HEENT: pupils equal, round, and reactive to light; extraocular muscles intact  NECK: supple, no carotid bruits   HEART: irregular rhythm, tachy S1 and S2, no murmurs, clicks, gallops or rubs   LUNGS: clear to auscultation bilaterally; no wheezes, rales, or rhonchi   ABDOMEN: normal bowel sounds, soft, no tenderness, no distention  EXTREMITIES: peripheral pulses normal; no clubbing, cyanosis, or edema  NEURO: no focal findings   SKIN: normal without suspicious lesions on exposed skin    Lab Results:   No results displayed because visit has over 200 results.        Labs & " Results:        Results from last 7 days   Lab Units 02/21/25  0552 02/20/25  0629 02/19/25  0836   WBC Thousand/uL 14.30* 16.79* 24.98*   HEMOGLOBIN g/dL 12.4 12.5 12.4   HEMATOCRIT % 36.0* 36.5 35.9*   PLATELETS Thousands/uL 144* 142* 209         Results from last 7 days   Lab Units 02/20/25  1549 02/20/25  0629 02/19/25  1928 02/19/25  0656 02/19/25  0542   POTASSIUM mmol/L 3.9 3.3* 3.8   < > 5.4*   CHLORIDE mmol/L 99 99 97   < > 98   CO2 mmol/L 26 25 23   < > 24   BUN mg/dL 22 18 19   < > 17   CREATININE mg/dL 1.08 1.02 1.20   < > 1.07   CALCIUM mg/dL 8.5 8.5 8.8   < > 7.9*   ALK PHOS U/L 83 83  --   --  84   ALT U/L 31 32  --   --  23   AST U/L 42* 52*  --   --  46*    < > = values in this interval not displayed.     Results from last 7 days   Lab Units 02/19/25  0542   INR  1.36*     Results from last 7 days   Lab Units 02/19/25 1928   MAGNESIUM mg/dL 1.7*         Imaging: Results Review Statement: I personally reviewed the following image studies in PACS and associated radiology reports: ECGs, chest xray, and Echocardiogram. My interpretation of the radiology images/reports is: See Below.    CT abdomen pelvis w contrast  Result Date: 2/20/2025  Narrative: CT ABDOMEN AND PELVIS WITH IV CONTRAST INDICATION: new fever, cholestatic pattern. . COMPARISON: None. TECHNIQUE: CT examination of the abdomen and pelvis was performed. Multiplanar 2D reformatted images were created from the source data. This examination, like all CT scans performed in the [facility], was performed utilizing techniques to minimize radiation dose exposure, including the use of iterative reconstruction and automated exposure control. Radiation dose length product (DLP) for this visit: 731 mGy-cm IV Contrast: 100 mL of iohexol Enteric Contrast: Not administered. FINDINGS: ABDOMEN LOWER CHEST: Hypoventilatory changes in the dependent aspects. Trace bilateral pleural effusions. LIVER/BILIARY TREE: Low-density structure with a  lobulated margin and septations in segment 4B adjacent to the gallbladder fossa, highly concerning for a pericholecystic abscess. GALLBLADDER: Overdistended with mild wall thickening and pericholecystic fat stranding. Layering sludge. SPLEEN: Unremarkable. PANCREAS: Diffuse atrophic changes, otherwise unremarkable. No duct dilatation. ADRENAL GLANDS: Unremarkable. KIDNEYS/URETERS: Unremarkable. No hydronephrosis. STOMACH AND BOWEL: No evidence of bowel obstruction or gross inflammatory process. Colonic diverticula. No diverticulitis. APPENDIX: Normal. ABDOMINOPELVIC CAVITY: No ascites. No pneumoperitoneum. No lymphadenopathy. VESSELS: Unremarkable for patient's age. PELVIS REPRODUCTIVE ORGANS: Right inguinal hernia extending to the scrotum, containing a loop of unobstructed bowel. Normal size prostate. URINARY BLADDER: Unremarkable. ABDOMINAL WALL/INGUINAL REGIONS: Right inguinal hernia containing a loop of unobstructed bowel. BONES: No acute fracture or suspicious osseous lesion. Degenerative changes in the spine and hips, right greater than left.     Impression: Overdistended gallbladder with mild wall thickening and pericholecystic fluid, likely reflecting cholecystitis. Lobulated hypodense lesion in the adjacent liver, not fully characterized but highly concerning for a pericholecystic abscess. Workstation performed: [id number]     Echo complete w/ contrast if indicated  Result Date: 2/19/2025  Narrative:   Left Ventricle: Left ventricular cavity size is normal. Wall thickness is normal. The left ventricular ejection fraction is 67% by biplane measurement. Systolic function is hyperdynamic. Wall motion cannot be accurately assessed. Diastolic function is normal.   Right Ventricle: Right ventricular cavity size is mildly dilated.   Aortic Valve: There is aortic valve sclerosis. Technically difficult study.     US right upper quadrant  Result Date: 2/19/2025  Narrative: RIGHT UPPER QUADRANT ULTRASOUND INDICATION:  Hyperbilirubinemia, elevated LFTs. COMPARISON: None TECHNIQUE: Real-time ultrasound of the right upper quadrant was performed with a curvilinear transducer with both volumetric sweeps and still imaging techniques. Study is limited due to bowel gas. FINDINGS: PANCREAS: The pancreas is mostly obscured by bowel gas. AORTA AND IVC: The aorta and IVC are mostly obscured by bowel gas. LIVER: Size: Within normal range. The liver measures 16.1 cm in the midclavicular line. Contour: Surface contour is smooth. Parenchyma: Much of the hepatic parenchyma is obscured by bowel gas. Visualized portions of hepatic parenchyma does does suggest mild increased echogenicity. No obvious liver mass identified. Limited imaging of the main portal vein shows it to be patent and hepatopetal. BILIARY: Gallbladder wall thickening of 6 mm. Some echogenic material is noted within the gallbladder suggesting sludge which appears to change configuration on decubitus view. No sonographic Sage sign. Some punctate more echogenic foci are noted without shadowing but could represent gravel. With certain positioning, echogenic features may be antidependent but this may be artifactual. No intrahepatic biliary dilatation. CBD measures 5.0 mm. No choledocholithiasis. KIDNEY: Right kidney measures 10.2 x 6.0 x 5.5 cm. Volume 174.1 mL Kidney within normal limits. ASCITES: None.     Impression: Gallbladder sludge with wall thickening but without sonographic Sage sign. Some gravel might be present. Cholecystitis is not excluded in the proper clinical context. X-ray of the abdomen with attention to the right upper quadrant is suggested to exclude any gas which is less likely. No evidence of common bile duct dilatation. Limited study due to bowel gas. This was discussed with Dr Sargent  at 2:45 p.m. Workstation performed: [id number]     TRAUMA - CT spine cervical wo contrast  Result Date: 2/18/2025  Narrative: CT CERVICAL SPINE - WITHOUT CONTRAST INDICATION:    TRAUMA. COMPARISON:  None. TECHNIQUE:  CT examination of the cervical spine was performed without intravenous contrast.  Contiguous axial images were obtained. Multiplanar 2D reformatted images were created from the source data. Radiation dose length product (DLP) for this visit:  303 mGy-cm .  This examination, like all CT scans performed in the [facility], was performed utilizing techniques to minimize radiation dose exposure, including the use of iterative reconstruction and automated exposure control. IMAGE QUALITY:  Diagnostic. FINDINGS: ALIGNMENT: There is straightening of normal cervical lordosis. Grade 1 anterolisthesis of C7 on T1. VERTEBRAE:  No fracture. DEGENERATIVE CHANGES: Moderate multilevel cervical degenerative changes are noted. No critical central canal stenosis. Flowing ossifications anterior to the vertebral bodies from C3 to C7 compatible with diffuse idiopathic skeletal hyperostosis. PREVERTEBRAL AND PARASPINAL SOFT TISSUES: Unremarkable THORACIC INLET:  Normal.     Impression: No cervical spine fracture or traumatic malalignment. I personally discussed this study with MAXIME KURTZ on 2/18/2025 9:49 PM. Workstation performed: [id number]     TRAUMA - CT head wo contrast  Result Date: 2/18/2025  Narrative: CT BRAIN - WITHOUT CONTRAST INDICATION:   TRAUMA. COMPARISON:  None. TECHNIQUE:  CT examination of the brain was performed.  Multiplanar 2D reformatted images were created from the source data. Radiation dose length product (DLP) for this visit:  984 mGy-cm .  This examination, like all CT scans performed in the [facility], was performed utilizing techniques to minimize radiation dose exposure, including the use of iterative reconstruction and automated exposure control. IMAGE QUALITY:  Diagnostic. FINDINGS: PARENCHYMA: Decreased attenuation is noted in periventricular and subcortical white matter demonstrating an appearance that is statistically most  likely to represent mild microangiopathic change. No CT signs of acute infarction.  No intracranial mass, mass effect or midline shift.  No acute parenchymal hemorrhage. VENTRICLES AND EXTRA-AXIAL SPACES:  Normal for the patient's age. VISUALIZED ORBITS:  Normal visualized orbits. PARANASAL SINUSES: Mild bilateral maxillary and bilateral ethmoid mucosal thickening. Air-fluid level with frothy secretions in the right maxillary sinus. CALVARIUM AND EXTRACRANIAL SOFT TISSUES:  Normal.     Impression: No acute intracranial abnormality. Air-fluid level with frothy secretions in the right maxillary sinus may represent acute sinusitis in the appropriate setting. I personally discussed this study with MAXIME ISIAH KURTZ on 2/18/2025 9:49 PM. Workstation performed: [id number]     XR Trauma chest portable  Result Date: 2/18/2025  Narrative: CHEST INDICATION: TRAUMA. COMPARISON: No similar prior studies available for comparison. TECHNIQUE: XR CHEST PORTABLE FINDINGS: The lungs are clear. No pleural effusions. No evidence of pneumothorax. Cardiac silhouette not accurately accessed on this projection. No obvious displaced fractures within limitation of AP chest technique.     Impression: No acute pulmonary pathology. No obvious displaced fractures within limitation of supine AP chest technique. Workstation performed: [id number]       EKG: Initial ECG showed sinus rhythm with nonspecific T wave changes.  Repeat ECG performed earlier today showed atrial fibrillation with rapid ventricular response.    Telemetry review: Initially sinus rhythm but then converted atrial fibrillation.  Atrial fibrillation with rapid ventricular response, intermittently with sinus rhythm, noted throughout overnight and into this morning.  Currently in atrial fibrillation.    Counseling / Coordination of Care  Total floor / unit time spent today 40 minutes.  Greater than 50% of total time was spent with the patient and / or family counseling and / or  coordination of care.

## 2025-02-21 NOTE — TELEMEDICINE
e-Consult ([facility])  - Interventional Radiology  Devin Cutler 80 y.o. male MRN: [id number]  Unit/Bed#: S -01 Encounter: [id number]          Interventional Radiology has been consulted to evaluate Devin Cutler      Inpatient Consult to IR  Consult performed by: Rhea Roth MD  Consult ordered by: Jackie Álvarez PA-C        02/21/25    Assessment/Recommendation:   Patient presented with acute cholecystitis.    IR consulted for cholecystostomy tube.    Possible pericholecystic liver abscess.    I reviewed CT personally.  Plan to place cholecystostomy tube.  I do not recommend placing a second tube into the pericholecystic fluid at this time given its small size, multi loculated appearance and the possibility of cholecystostomy tube decompressing this fluid through wall fenestration.  This fluid can be reassessed in follow-up imaging after cholecystostomy tube placement.    Plan to do cholecystostomy tube placement as soon as possible.  He currently has atrial fibrillation with heart rate in 110's - 120's.  Cardiology on board and cleared patient for procedure.        11-20 minutes, >50% of the total time devoted to medical consultative verbal/EMR discussion between providers. Written report will be generated in the EMR.     Thank you for allowing Interventional Radiology to participate in the care of Devin Cutler. Please don't hesitate to call or TigerText us with any questions.     Rhea Roth MD

## 2025-02-21 NOTE — SEDATION DOCUMENTATION
Procedure ended. IR cholecystostomy tube placed by Dr. Roth. Tube to bag drainage. Dressing applied. Specimen sent to lab. Report and care assumed by primary RN

## 2025-02-21 NOTE — ASSESSMENT & PLAN NOTE
This occurred after admission in the setting of his sepsis and acute cholecystitis.  He has gone in and out of atrial fibrillation, but currently in rapid atrial fibrillation.  IV amiodarone was started.  If blood pressure allows I would initiate around-the-clock beta-blocker as well.

## 2025-02-21 NOTE — ASSESSMENT & PLAN NOTE
Recent Labs     02/19/25  0542 02/20/25  0629 02/20/25  1549   AST 46* 52* 42*   ALT 23 32 31   ALKPHOS 84 83 83   TBILI 2.58* 1.04* 0.85   BILIDIR 0.54*  --   --      UA: positive for urobilinogen  History of drinking about 9 drinks per week (3 beers three times per week while playing pole)  No abdominal pain, N/V. Abdomen nontender on exam. Suspect possibly related to sepsis. Hemoglobin normal, low suspicion due to hemolytic anemia at this time.    Suspect secondary to sepsis and cholecystitis     Plan:  Continue to trend LFTs  Surgery consult

## 2025-02-21 NOTE — ASSESSMENT & PLAN NOTE
WBC   Date Value Ref Range Status   02/21/2025 14.30 (H) 4.31 - 10.16 Thousand/uL Final   02/20/2025 16.79 (H) 4.31 - 10.16 Thousand/uL Final   02/19/2025 24.98 (H) 4.31 - 10.16 Thousand/uL Final     LACTIC ACID   Date Value Ref Range Status   02/19/2025 2.2 (H) 0.5 - 2.0 mmol/L Final   02/19/2025 3.1 (H) 0.5 - 2.0 mmol/L Final     Lab Results   Component Value Date    BLOODCX Escherichia coli (A) 02/18/2025    BLOODCX Escherichia coli (A) 02/18/2025     CT A/P: Over distended gallbladder reflecting Paula cystitis.  Loculated hypodense lesion in adjacent liver not fully characterized by concerning for pericholecystic abscess  CXR negative  CT head showed possible acute right maxillary sinusitis     Assessment: Blood culture growing E. coli suspect translocation from cholecystitis. Planned for IR drainage tentatively at 4PM if hr under control       Plan:  Continue ceftriaxone 2g q24h d4  Flagyl 500 mg tid d3  Surgery consult for abscess and cholecystitis

## 2025-02-21 NOTE — ASSESSMENT & PLAN NOTE
Assessment: Suspect triggered by active pulmonary illness and being septic.  Currently converted back to sinus rhythm on amiodarone drip. Will cross with home metoprolol, initiation of AC will depends on if acute intervention of cholecystitis is indicated.     - resumed amio drip   - cardiology consult  - ac timing tbd with procedure.

## 2025-02-21 NOTE — ASSESSMENT & PLAN NOTE
Recent Labs     02/19/25  1928 02/20/25  0629 02/20/25  1549   CREATININE 1.20 1.02 1.08   EGFR 57 69 64     Estimated Creatinine Clearance: 51.9 mL/min (by C-G formula based on SCr of 1.08 mg/dL).    POA: Cr: 1.31 (baseline 0.9-1.0), GFR: 51 (baseline 80s)  Likely prerenal 2/2 dehydration/hypotension 2/2 sepsis 2/2 PNA  S/p 1L NSS IVF bolus    Assessment: As of the date of this note, creatinine level has returned near baseline.    Plan:  Monitor BMP  Avoid hypoperfusion of the kidneys, minimize nephrotoxins  Held Celebrex

## 2025-02-21 NOTE — ASSESSMENT & PLAN NOTE
Initial Blood pressure on admission 72/40. Suspect dehydration/hypotension 2/2 sepsis 2/2 PNA.  S/p 1L NSS IVF bolus  Home regimen: Metoprolol Tartrate 25mg BID    Plan:  Hold anti hypertensives for now, given sbp in acceptable range

## 2025-02-21 NOTE — ASSESSMENT & PLAN NOTE
CT: Overdistended gallbladder with mild wall thickening and pericholecystic fluid, likely reflecting cholecystitis.  Lobulated hypodense lesion in the adjacent liver, not fully characterized but highly concerning for a pericholecystic abscess.     - continue ctx and flagyl  - IR   - surg consult

## 2025-02-21 NOTE — PLAN OF CARE

## 2025-02-21 NOTE — QUICK NOTE
Called for recurrent afib w/ -150s. Asymptomatic, VSS except for HR.  IV metoprolol 5 mg x 2 administered, with persistent afib w/ RVR > 140s. Cardiology recommending anticoagulation with amiodarone bolus and drip.  Anticoagulation currently held for IR evaluation in the day 2/21.  Amiodarone bolus and gtt was to be started, but patient had spontaneous conversion to NSR HR 72.  Will continue to observe without amiodarone.

## 2025-02-22 LAB
ALBUMIN SERPL BCG-MCNC: 2.5 G/DL (ref 3.5–5)
ALP SERPL-CCNC: 74 U/L (ref 34–104)
ALT SERPL W P-5'-P-CCNC: 25 U/L (ref 7–52)
ANION GAP SERPL CALCULATED.3IONS-SCNC: 8 MMOL/L (ref 4–13)
AST SERPL W P-5'-P-CCNC: 31 U/L (ref 13–39)
ATRIAL RATE: 292 BPM
ATRIAL RATE: 74 BPM
BASOPHILS # BLD AUTO: 0.04 THOUSANDS/ΜL (ref 0–0.1)
BASOPHILS NFR BLD AUTO: 0 % (ref 0–1)
BILIRUB SERPL-MCNC: 0.66 MG/DL (ref 0.2–1)
BUN SERPL-MCNC: 17 MG/DL (ref 5–25)
CALCIUM ALBUM COR SERPL-MCNC: 8.7 MG/DL (ref 8.3–10.1)
CALCIUM SERPL-MCNC: 7.5 MG/DL (ref 8.4–10.2)
CHLORIDE SERPL-SCNC: 102 MMOL/L (ref 96–108)
CO2 SERPL-SCNC: 22 MMOL/L (ref 21–32)
CREAT SERPL-MCNC: 0.84 MG/DL (ref 0.6–1.3)
EOSINOPHIL # BLD AUTO: 0.02 THOUSAND/ΜL (ref 0–0.61)
EOSINOPHIL NFR BLD AUTO: 0 % (ref 0–6)
ERYTHROCYTE [DISTWIDTH] IN BLOOD BY AUTOMATED COUNT: 13.4 % (ref 11.6–15.1)
GFR SERPL CREATININE-BSD FRML MDRD: 83 ML/MIN/1.73SQ M
GLUCOSE SERPL-MCNC: 92 MG/DL (ref 65–140)
HCT VFR BLD AUTO: 34.8 % (ref 36.5–49.3)
HGB BLD-MCNC: 11.8 G/DL (ref 12–17)
IMM GRANULOCYTES # BLD AUTO: 0.13 THOUSAND/UL (ref 0–0.2)
IMM GRANULOCYTES NFR BLD AUTO: 1 % (ref 0–2)
LYMPHOCYTES # BLD AUTO: 0.77 THOUSANDS/ΜL (ref 0.6–4.47)
LYMPHOCYTES NFR BLD AUTO: 8 % (ref 14–44)
MAGNESIUM SERPL-MCNC: 2.1 MG/DL (ref 1.9–2.7)
MCH RBC QN AUTO: 29.8 PG (ref 26.8–34.3)
MCHC RBC AUTO-ENTMCNC: 33.9 G/DL (ref 31.4–37.4)
MCV RBC AUTO: 88 FL (ref 82–98)
MONOCYTES # BLD AUTO: 0.63 THOUSAND/ΜL (ref 0.17–1.22)
MONOCYTES NFR BLD AUTO: 6 % (ref 4–12)
NEUTROPHILS # BLD AUTO: 8.57 THOUSANDS/ΜL (ref 1.85–7.62)
NEUTS SEG NFR BLD AUTO: 85 % (ref 43–75)
NRBC BLD AUTO-RTO: 0 /100 WBCS
P AXIS: 14 DEGREES
P AXIS: 24 DEGREES
PLATELET # BLD AUTO: 148 THOUSANDS/UL (ref 149–390)
PMV BLD AUTO: 9.7 FL (ref 8.9–12.7)
POTASSIUM SERPL-SCNC: 3.8 MMOL/L (ref 3.5–5.3)
PR INTERVAL: 136 MS
PROT SERPL-MCNC: 5.2 G/DL (ref 6.4–8.4)
QRS AXIS: 33 DEGREES
QRS AXIS: 39 DEGREES
QRSD INTERVAL: 84 MS
QRSD INTERVAL: 86 MS
QT INTERVAL: 332 MS
QT INTERVAL: 408 MS
QTC INTERVAL: 453 MS
QTC INTERVAL: 518 MS
RBC # BLD AUTO: 3.96 MILLION/UL (ref 3.88–5.62)
SODIUM SERPL-SCNC: 132 MMOL/L (ref 135–147)
T WAVE AXIS: -2 DEGREES
T WAVE AXIS: 141 DEGREES
VENTRICULAR RATE: 146 BPM
VENTRICULAR RATE: 74 BPM
WBC # BLD AUTO: 10.16 THOUSAND/UL (ref 4.31–10.16)

## 2025-02-22 PROCEDURE — 93010 ELECTROCARDIOGRAM REPORT: CPT | Performed by: INTERNAL MEDICINE

## 2025-02-22 PROCEDURE — 93005 ELECTROCARDIOGRAM TRACING: CPT

## 2025-02-22 PROCEDURE — 99232 SBSQ HOSP IP/OBS MODERATE 35: CPT | Performed by: SURGERY

## 2025-02-22 PROCEDURE — 80053 COMPREHEN METABOLIC PANEL: CPT

## 2025-02-22 PROCEDURE — 85025 COMPLETE CBC W/AUTO DIFF WBC: CPT | Performed by: HOSPITALIST

## 2025-02-22 PROCEDURE — 99232 SBSQ HOSP IP/OBS MODERATE 35: CPT | Performed by: INTERNAL MEDICINE

## 2025-02-22 PROCEDURE — 83735 ASSAY OF MAGNESIUM: CPT | Performed by: HOSPITALIST

## 2025-02-22 PROCEDURE — 99232 SBSQ HOSP IP/OBS MODERATE 35: CPT | Performed by: HOSPITALIST

## 2025-02-22 RX ORDER — AMIODARONE HYDROCHLORIDE 200 MG/1
200 TABLET ORAL
Status: DISCONTINUED | OUTPATIENT
Start: 2025-02-22 | End: 2025-02-23 | Stop reason: HOSPADM

## 2025-02-22 RX ORDER — METOPROLOL TARTRATE 25 MG/1
25 TABLET, FILM COATED ORAL EVERY 12 HOURS SCHEDULED
Status: DISCONTINUED | OUTPATIENT
Start: 2025-02-22 | End: 2025-02-23 | Stop reason: HOSPADM

## 2025-02-22 RX ADMIN — PANTOPRAZOLE SODIUM 40 MG: 40 TABLET, DELAYED RELEASE ORAL at 05:44

## 2025-02-22 RX ADMIN — METRONIDAZOLE 500 MG: 500 TABLET ORAL at 05:44

## 2025-02-22 RX ADMIN — METOPROLOL TARTRATE 25 MG: 25 TABLET, FILM COATED ORAL at 14:04

## 2025-02-22 RX ADMIN — METOPROLOL TARTRATE 2.5 MG: 1 INJECTION, SOLUTION INTRAVENOUS at 00:09

## 2025-02-22 RX ADMIN — METRONIDAZOLE 500 MG: 500 TABLET ORAL at 21:42

## 2025-02-22 RX ADMIN — AMIODARONE HYDROCHLORIDE 200 MG: 200 TABLET ORAL at 14:03

## 2025-02-22 RX ADMIN — AMIODARONE HYDROCHLORIDE 200 MG: 200 TABLET ORAL at 17:48

## 2025-02-22 RX ADMIN — ROPINIROLE 0.5 MG: 0.25 TABLET, FILM COATED ORAL at 08:53

## 2025-02-22 RX ADMIN — BENZONATATE 200 MG: 100 CAPSULE ORAL at 21:42

## 2025-02-22 RX ADMIN — ROPINIROLE 0.5 MG: 0.25 TABLET, FILM COATED ORAL at 17:58

## 2025-02-22 RX ADMIN — MULTIPLE VITAMINS W/ MINERALS TAB 1 TABLET: TAB ORAL at 08:52

## 2025-02-22 RX ADMIN — METRONIDAZOLE 500 MG: 500 TABLET ORAL at 14:03

## 2025-02-22 RX ADMIN — BENZONATATE 200 MG: 100 CAPSULE ORAL at 08:52

## 2025-02-22 RX ADMIN — GUAIFENESIN 1200 MG: 600 TABLET ORAL at 17:48

## 2025-02-22 RX ADMIN — HEPARIN SODIUM 5000 UNITS: 5000 INJECTION INTRAVENOUS; SUBCUTANEOUS at 05:44

## 2025-02-22 RX ADMIN — THIAMINE HYDROCHLORIDE: 100 INJECTION, SOLUTION INTRAMUSCULAR; INTRAVENOUS at 11:10

## 2025-02-22 RX ADMIN — BENZONATATE 200 MG: 100 CAPSULE ORAL at 17:58

## 2025-02-22 RX ADMIN — HEPARIN SODIUM 5000 UNITS: 5000 INJECTION INTRAVENOUS; SUBCUTANEOUS at 21:44

## 2025-02-22 RX ADMIN — CEFTRIAXONE SODIUM 2000 MG: 10 INJECTION, POWDER, FOR SOLUTION INTRAVENOUS at 17:58

## 2025-02-22 RX ADMIN — METOPROLOL TARTRATE 2.5 MG: 1 INJECTION, SOLUTION INTRAVENOUS at 05:43

## 2025-02-22 RX ADMIN — Medication 1000 UNITS: at 08:53

## 2025-02-22 RX ADMIN — HEPARIN SODIUM 5000 UNITS: 5000 INJECTION INTRAVENOUS; SUBCUTANEOUS at 14:03

## 2025-02-22 RX ADMIN — LEVOTHYROXINE SODIUM 25 MCG: 0.03 TABLET ORAL at 05:44

## 2025-02-22 RX ADMIN — ROPINIROLE 0.5 MG: 0.25 TABLET, FILM COATED ORAL at 21:41

## 2025-02-22 RX ADMIN — GUAIFENESIN 1200 MG: 600 TABLET ORAL at 08:52

## 2025-02-22 NOTE — ASSESSMENT & PLAN NOTE
Assessment: Suspect triggered by active pulmonary illness and being septic.  Currently converted back to sinus rhythm on amiodarone drip. Will cross with home metoprolol, initiation of AC will depends on if acute intervention of cholecystitis is indicated.     - resumed amio drip   - cardiology consult  Recommending beta-blocker if blood pressures allow

## 2025-02-22 NOTE — PROGRESS NOTES
Progress Note - Hospitalist   Name: Devin Cutler 80 y.o. male I MRN: [id number]  Unit/Bed#: S -01 I Date of Admission: 2/18/2025   Date of Service: 2/22/2025 I Hospital Day: 4    Assessment & Plan  Sepsis (HCC)  WBC   Date Value Ref Range Status   02/22/2025 10.16 4.31 - 10.16 Thousand/uL Final   02/21/2025 14.30 (H) 4.31 - 10.16 Thousand/uL Final   02/20/2025 16.79 (H) 4.31 - 10.16 Thousand/uL Final     LACTIC ACID   Date Value Ref Range Status   02/19/2025 2.2 (H) 0.5 - 2.0 mmol/L Final   02/19/2025 3.1 (H) 0.5 - 2.0 mmol/L Final     Lab Results   Component Value Date    BLOODCX Escherichia coli (A) 02/18/2025    BLOODCX Escherichia coli (A) 02/18/2025     CT A/P: Over distended gallbladder reflecting Paula cystitis.  Loculated hypodense lesion in adjacent liver not fully characterized by concerning for pericholecystic abscess  CXR negative  CT head showed possible acute right maxillary sinusitis     Assessment: Blood culture growing E. coli suspect translocation from cholecystitis.  Underwent successful cholecystostomy tube placement 2/21.  Will monitor on IV antibiotics overnight, may be stable for DC Monday, will need to wean off amiodarone drip.    Plan:  Continue ceftriaxone 2g q24h d5  Flagyl 500 mg tid d4  Follow-up body fluid culture  URI symptoms  Reports productive cough over the past two weeks with significant worsening over the last two days with fatigue and poor PO intake with known sick contacts  CT head noted right maxillary sinus may represent acute sinusitis  CXR negative, however increased rales in RLL on exam. Suspect CXR did not show RLL consolidation due to dehydration.      Assessment: Possible viral infection followed by superimposed bacterial infection. Treating as community acquired PNA    Plan:  Continue ceftriaxone q24h d5  Mucinex 1200mg BID   Tesaloon Perles 100mg TID prn for cough  Incentive Spirometry  Continue to monitor respiratory   A-fib (HCC) with rvr  Assessment:  Suspect triggered by active pulmonary illness and being septic.  Currently converted back to sinus rhythm on amiodarone drip. Will cross with home metoprolol, initiation of AC will depends on if acute intervention of cholecystitis is indicated.     - resumed amio drip   - cardiology consult  Recommending beta-blocker if blood pressures allow  Cholecystitis  CT: Overdistended gallbladder with mild wall thickening and pericholecystic fluid, likely reflecting cholecystitis.  Lobulated hypodense lesion in the adjacent liver, not fully characterized but highly concerning for a pericholecystic abscess.     - continue ctx and flagyl  - IR   - surg consult  Elevated serum creatinine  Recent Labs     02/20/25  1549 02/21/25  1653 02/22/25  0339   CREATININE 1.08 0.74 0.84   EGFR 64 87 83     Estimated Creatinine Clearance: 66.7 mL/min (by C-G formula based on SCr of 0.84 mg/dL).    POA: Cr: 1.31 (baseline 0.9-1.0), GFR: 51 (baseline 80s)  Likely prerenal 2/2 dehydration/hypotension 2/2 sepsis 2/2 PNA  S/p 1L NSS IVF bolus    Assessment: As of the date of this note, creatinine level has returned near baseline.    Plan:  Monitor BMP  Avoid hypoperfusion of the kidneys, minimize nephrotoxins  Held Celebrex  Hyperbilirubinemia  Recent Labs     02/20/25  1549 02/21/25  1653 02/22/25  0339   AST 42* 29 31   ALT 31 26 25   ALKPHOS 83 89 74   TBILI 0.85 0.95 0.66     UA: positive for urobilinogen  History of drinking about 9 drinks per week (3 beers three times per week while playing pole)  No abdominal pain, N/V. Abdomen nontender on exam. Suspect possibly related to sepsis. Hemoglobin normal, low suspicion due to hemolytic anemia at this time.    Suspect secondary to sepsis and cholecystitis     Plan:  Continue to trend LFTs  Surgery consult    Syncopal episodes  Presented to the ED after syncopal episode x2 while playing pool. Hit head on fall.  Trauma work up negative, including negative CT head  Meet Sepsis criteria with  tachypnea, hypotension, leukocytosis, and lactic acidosis with suspected PNA etiology. Initial BP 72/40, responded well to IVF.  Reports productive cough over the past two weeks with significant worsening over the last two days with fatigue and poor PO intake with known sick contacts.  EKG NSR, rate 66  Suspect syncopal episodes 2/2 sepsis 2/2 PNA    Plan:  Treat problem above  Essential hypertension  Initial Blood pressure on admission 72/40. Suspect dehydration/hypotension 2/2 sepsis 2/2 PNA.  S/p 1L NSS IVF bolus  Home regimen: Metoprolol Tartrate 25mg BID    Plan:  Hold anti hypertensives for now, given sbp in acceptable range     Anxiety  History of anxiety  Continue Xanax 0.5mg qhs prn  GERD (gastroesophageal reflux disease)  Home regimen: lansoprazole 30mg  Continue Pantoprazole 40mg PO while inpatient  RLS (restless legs syndrome)  Continue Ropinirole 0.5mg TID prn  Hypothyroidism due to acquired atrophy of thyroid  Continue Synthroid 25mg    Right hip pain  History of Right hip pain  Hold Celebrex and Voltaren gel due to RANDAL  Continue lidocaine patch  Continue Tyelnol prn for pain    VTE Pharmacologic Prophylaxis: VTE Score: 6 High Risk (Score >/= 5) - Pharmacological DVT Prophylaxis Ordered: heparin. Sequential Compression Devices Ordered.    Mobility:   Basic Mobility Inpatient Raw Score: 19  JH-HLM Goal: 6: Walk 10 steps or more  JH-HLM Achieved: 1: Laying in bed  JH-HLM Goal achieved. Continue to encourage appropriate mobility.    Patient Centered Rounds: I performed bedside rounds with nursing staff today.   Discussions with Specialists or Other Care Team Provider: nursing    Education and Discussions with Family / Patient: Updated  (daughter) via phone.    Current Length of Stay: 4 day(s)  Current Patient Status: Inpatient   Certification Statement: The patient will continue to require additional inpatient hospital stay due to sepsis and cholesystitis  Discharge Plan: Anticipate  discharge tomorrow to home.    Code Status: Level 1 - Full Code    Subjective     Was notified by nurse patient HR was in the 140s.     Promptly went to bedside to evaluate.  Patient is asymptomatic: Denies headache, vision changes, lightheadedness, chest pain, shortness of breath, any pain.  Physical exam -tachycardic, regular.  Lungs CTA.  Perfusion intact throughout all extremities via radial and dorsalis pedis pulse check.      BP 98/64 manual (MAP 75). ; HR in 130-140s.     Tele reviewed - sinus tach, though EKG obtained and per my read sinus tach vs aflutter      Night note above.  Successful cholecystostomy tube placement    Complaints: No complaints.     Patient denies Headache, Fever, Chills, Chest Pain, Shortness of breath, Nausea, or Vomiting, Abdominal Pain, Diarrhea, Swellings, Dysuria, Urgency, frequency, or incontinence, Hematuria, new or worsening anxiety or depression    Diet: Diet GI; Lo Fat   Bowel regimen:       DVT/VTE Prophylaxis: VTE covered by:  heparin (porcine), Subcutaneous, 5,000 Units at 02/22/25 0544         Objective :  Temp:  [97.6 °F (36.4 °C)-98.9 °F (37.2 °C)] 98.9 °F (37.2 °C)  HR:  [] 71  BP: ()/(53-80) 93/53  Resp:  [15-20] 15  SpO2:  [91 %-98 %] 92 %  O2 Device: None (Room air)  Nasal Cannula O2 Flow Rate (L/min):  [2 L/min] 2 L/min    Body mass index is 26.45 kg/m².     Input and Output Summary (last 24 hours):     Intake/Output Summary (Last 24 hours) at 2/22/2025 0818  Last data filed at 2/22/2025 0402  Gross per 24 hour   Intake 1064.56 ml   Output 1715 ml   Net -650.44 ml       Physical Exam  Vitals and nursing note reviewed.   Constitutional:       General: He is not in acute distress.     Appearance: He is well-developed.   HENT:      Head: Normocephalic and atraumatic.   Eyes:      Conjunctiva/sclera: Conjunctivae normal.   Cardiovascular:      Rate and Rhythm: Normal rate and regular rhythm.      Heart sounds: No murmur heard.  Pulmonary:      Effort:  Pulmonary effort is normal. No respiratory distress.      Breath sounds: Normal breath sounds.   Abdominal:      Palpations: Abdomen is soft.      Tenderness: There is no abdominal tenderness.   Musculoskeletal:         General: No swelling.      Cervical back: Neck supple.   Skin:     General: Skin is warm and dry.      Capillary Refill: Capillary refill takes less than 2 seconds.   Neurological:      Mental Status: He is alert.   Psychiatric:         Mood and Affect: Mood normal.            Lines/Drains:  Lines/Drains/Airways       Active Status       Name Placement date Placement time Site Days    Cholecystostomy Tube 02/21/25  1347  -- less than 1                      Telemetry:  Telemetry Orders (From admission, onward)               24 Hour Telemetry Monitoring  Continuous x 24 Hours (Telem)        Expiring   Question:  Reason for 24 Hour Telemetry  Answer:  Syncope suspected to be cardiac in origin                     Telemetry Reviewed: Normal Sinus Rhythm after midnight  Indication for Continued Telemetry Use: Arrthymias requiring medical therapy               Lab Results: I have reviewed the following results:   Results from last 7 days   Lab Units 02/22/25  0353 02/20/25  0629 02/19/25  0836   WBC Thousand/uL 10.16   < > 24.98*   HEMOGLOBIN g/dL 11.8*   < > 12.4   HEMATOCRIT % 34.8*   < > 35.9*   PLATELETS Thousands/uL 148*   < > 209   BANDS PCT %  --   --  8   SEGS PCT % 85*   < >  --    LYMPHO PCT % 8*   < > 2*   MONO PCT % 6   < > 3*   EOS PCT % 0   < > 0    < > = values in this interval not displayed.     Results from last 7 days   Lab Units 02/22/25  0339   SODIUM mmol/L 132*   POTASSIUM mmol/L 3.8   CHLORIDE mmol/L 102   CO2 mmol/L 22   BUN mg/dL 17   CREATININE mg/dL 0.84   ANION GAP mmol/L 8   CALCIUM mg/dL 7.5*   ALBUMIN g/dL 2.5*   TOTAL BILIRUBIN mg/dL 0.66   ALK PHOS U/L 74   ALT U/L 25   AST U/L 31   GLUCOSE RANDOM mg/dL 92     Results from last 7 days   Lab Units 02/19/25  0542   INR  1.36*      Results from last 7 days   Lab Units 02/19/25  1922   POC GLUCOSE mg/dl 82         Results from last 7 days   Lab Units 02/19/25  0656 02/19/25  0506 02/18/25  2359 02/18/25  2138   LACTIC ACID mmol/L 2.2* 3.1* 2.1* 2.5*   PROCALCITONIN ng/ml  --  4.64*  --  1.21*       Recent Cultures (last 7 days):   Results from last 7 days   Lab Units 02/21/25  1350 02/19/25  0105 02/18/25  2145 02/18/25  2138   BLOOD CULTURE   --   --  Escherichia coli* Escherichia coli*   GRAM STAIN RESULT  1+ Gram positive cocci in pairs*  1+ Gram negative rods*  Rare Polys*  --  Gram negative rods* Gram negative rods*   LEGIONELLA URINARY ANTIGEN   --  Negative  --   --              Last 24 Hours Medication List:     Current Facility-Administered Medications:     acetaminophen (TYLENOL) tablet 650 mg, Q4H PRN    ALPRAZolam (XANAX) tablet 0.5 mg, HS PRN    amiodarone (CORDARONE) 900 mg in dextrose 5 % 500 mL infusion, Continuous, Last Rate: 0.5 mg/min (02/21/25 0850)    benzonatate (TESSALON PERLES) capsule 200 mg, TID    ceftriaxone (ROCEPHIN) 2 g/50 mL in dextrose IVPB, Q24H, Last Rate: 2,000 mg (02/21/25 1727)    Cholecalciferol (VITAMIN D3) tablet 1,000 Units, Daily    [Held by provider] Diclofenac Sodium (VOLTAREN) 1 % topical gel 2 g, 4x Daily    folic acid 1 mg, thiamine (VITAMIN B1) 100 mg in sodium chloride 0.9 % 100 mL IV piggyback, Daily, Last Rate: 200 mL/hr at 02/21/25 1538    guaiFENesin (MUCINEX) 12 hr tablet 1,200 mg, BID    heparin (porcine) subcutaneous injection 5,000 Units, Q8H LITO **AND** Platelet count, Once    HYDROcodone Bit-Homatrop MBr (HYCODAN) oral syrup 5 mL, Q4H PRN    levothyroxine tablet 25 mcg, Early Morning    lidocaine (LIDODERM) 5 % patch 1 patch, Daily    metoprolol (LOPRESSOR) injection 2.5 mg, Q6H    [Held by provider] metoprolol succinate (TOPROL-XL) 24 hr tablet 25 mg, BID    metroNIDAZOLE (FLAGYL) tablet 500 mg, Q8H LITO    multivitamin-minerals (CENTRUM) tablet 1 tablet, Daily    ondansetron  (ZOFRAN-ODT) dispersible tablet 4 mg, Q6H PRN    pantoprazole (PROTONIX) EC tablet 40 mg, Early Morning    rOPINIRole (REQUIP) tablet 0.5 mg, TID    Administrative Statements   Today, Patient Was Seen By: Heaven Angel MD      **Please Note: This note may have been constructed using a voice recognition system.**

## 2025-02-22 NOTE — ASSESSMENT & PLAN NOTE
Recent Labs     02/20/25  1549 02/21/25  1653 02/22/25  0339   AST 42* 29 31   ALT 31 26 25   ALKPHOS 83 89 74   TBILI 0.85 0.95 0.66     UA: positive for urobilinogen  History of drinking about 9 drinks per week (3 beers three times per week while playing pole)  No abdominal pain, N/V. Abdomen nontender on exam. Suspect possibly related to sepsis. Hemoglobin normal, low suspicion due to hemolytic anemia at this time.    Suspect secondary to sepsis and cholecystitis     Plan:  Continue to trend LFTs  Surgery consult

## 2025-02-22 NOTE — PROGRESS NOTES
Progress Note - Surgery-General   Name: Devin Cutler 80 y.o. male I MRN: [id number]  Unit/Bed#: S -01 I Date of Admission: 2/18/2025   Date of Service: 2/22/2025 I Hospital Day: 4     Assessment & Plan  Sepsis (HCC)  See below  Hyperbilirubinemia  Tbili elevated to 3.5 on admission, down-trending, now 1.04  Likely in the setting of acute cholecystitis  RUQ us: sludge with wall thickening. Some gravel might be present. No common bile duct dilation.    See below  Trend LFTs  Cholecystitis  80-year-old male with imaging evidence of acute cholecystitis complicated by possible pericholecystic abscess in liver, clinically improving    S/p IR perc tameka tube on 2/21    Plan  - Diet as tolerated  - Continue IV antibiotics, duration pending repeat imaging  - Repeat CT AP on 2/23 to evaluate for drainage of liver abscess  - Monitor and replete electrolytes as needed  - Monitor and record drain output quantity and quality  - Multimodal pain regimen  - Encourage ambulation/out of bed, 3 times daily  - Encourage incentive spirometer use  - PT OT recommending level 3 rehabilitation  - Rest of care per primary team    Results from last 7 days   Lab Units 02/22/25  0353 02/21/25  0552 02/20/25  0629   WBC Thousand/uL 10.16 14.30* 16.79*         Surgery-General service will follow.    Subjective   No acute events overnight. Afebrile, hemodynamically stable. No nausea, or vomiting, fevers or chills. Pain controlled.      Objective :  Temp:  [97.6 °F (36.4 °C)-98.9 °F (37.2 °C)] 98.9 °F (37.2 °C)  HR:  [] 71  BP: ()/(53-80) 93/53  Resp:  [15-20] 15  SpO2:  [91 %-98 %] 92 %  O2 Device: None (Room air)  Nasal Cannula O2 Flow Rate (L/min):  [2 L/min] 2 L/min    I/O         02/20 0701  02/21 0700 02/21 0701  02/22 0700 02/22 0701  02/23 0700    P.O. 745 290     I.V. (mL/kg)  774.6 (10.1)     NG/GT  0     IV Piggyback       Total Intake(mL/kg) 745 (9.6) 1064.6 (13.9)     Urine (mL/kg/hr) 1125 (0.6) 1360 (0.7)      Emesis/NG output  355     Total Output 1125 1715     Net -380 -650.4                  Lines/Drains/Airways       Active Status       Name Placement date Placement time Site Days    Cholecystostomy Tube 02/21/25  1347  -- less than 1                    Physical Exam:  General: No acute distress, alert and oriented  CV: Well perfused, regular rate and rhythm  Lungs: Normal work of breathing, no increased respiratory effort  Abdomen: Soft, non-tender, non-distended. IR perc tameka tube in place.  Extremities: No edema, clubbing or cyanosis  Skin: Warm, dry      Lab Results: I have reviewed the following results:  Recent Labs     02/19/25  1928 02/19/25  2105 02/19/25  2308 02/20/25  0629 02/22/25  0339 02/22/25  0353   WBC  --   --   --    < >  --  10.16   HGB  --   --   --    < >  --  11.8*   HCT  --   --   --    < >  --  34.8*   PLT  --   --   --    < >  --  148*   SODIUM 133*  --   --    < > 132*  --    K 3.8  --   --    < > 3.8  --    CL 97  --   --    < > 102  --    CO2 23  --   --    < > 22  --    BUN 19  --   --    < > 17  --    CREATININE 1.20  --   --    < > 0.84  --    GLUC 89  --   --    < > 92  --    CAIONIZED 1.10*  --   --   --   --   --    MG 1.7*  --   --   --   --  2.1   AST  --   --   --    < > 31  --    ALT  --   --   --    < > 25  --    ALB  --   --   --    < > 2.5*  --    TBILI  --   --   --    < > 0.66  --    ALKPHOS  --   --   --    < > 74  --    HSTNI0  --  98*  --   --   --   --    HSTNI2  --   --  101*  --   --   --     < > = values in this interval not displayed.       Imaging Results Review: No pertinent imaging studies reviewed.  Other Study Results Review: No additional pertinent studies reviewed.    VTE Pharmacologic Prophylaxis: VTE covered by:  heparin (porcine), Subcutaneous, 5,000 Units at 02/22/25 0544     VTE Mechanical Prophylaxis: sequential compression device    Hilario Ruff MD  General Surgery   02/22/25

## 2025-02-22 NOTE — ASSESSMENT & PLAN NOTE
Recent Labs     02/20/25  1549 02/21/25  1653 02/22/25  0339   CREATININE 1.08 0.74 0.84   EGFR 64 87 83     Estimated Creatinine Clearance: 66.7 mL/min (by C-G formula based on SCr of 0.84 mg/dL).    POA: Cr: 1.31 (baseline 0.9-1.0), GFR: 51 (baseline 80s)  Likely prerenal 2/2 dehydration/hypotension 2/2 sepsis 2/2 PNA  S/p 1L NSS IVF bolus    Assessment: As of the date of this note, creatinine level has returned near baseline.    Plan:  Monitor BMP  Avoid hypoperfusion of the kidneys, minimize nephrotoxins  Held Celebrex

## 2025-02-22 NOTE — ASSESSMENT & PLAN NOTE
Reports productive cough over the past two weeks with significant worsening over the last two days with fatigue and poor PO intake with known sick contacts  CT head noted right maxillary sinus may represent acute sinusitis  CXR negative, however increased rales in RLL on exam. Suspect CXR did not show RLL consolidation due to dehydration.      Assessment: Possible viral infection followed by superimposed bacterial infection. Treating as community acquired PNA    Plan:  Continue ceftriaxone q24h d5  Mucinex 1200mg BID   Tesaloon Perles 100mg TID prn for cough  Incentive Spirometry  Continue to monitor respiratory

## 2025-02-22 NOTE — QUICK NOTE
Was notified by nurse patient HR was in the 140s.    Promptly went to bedside to evaluate.  Patient is asymptomatic: Denies headache, vision changes, lightheadedness, chest pain, shortness of breath, any pain.  Physical exam -tachycardic, regular.  Lungs CTA.  Perfusion intact throughout all extremities via radial and dorsalis pedis pulse check.     BP 98/64 manual (MAP 75). ; HR in 130-140s.    Tele reviewed - sinus tach, though EKG obtained and per my read sinus tach vs aflutter

## 2025-02-22 NOTE — PROGRESS NOTES
"Progress Note - Cardiology   Name: Devin Cutler 80 y.o. male I MRN: [id number]  Unit/Bed#: S -01 I Date of Admission: 2/18/2025   Date of Service: 2/22/2025 I Hospital Day: 4     Assessment & Plan  Sepsis ([facility])  Wesley was admitted with syncope associated with hypotension, leukocytosis and sepsis, found to have acute cholecystitis.  He is also being treated for pneumonia/bronchitis.  He is hemodynamically improving.  A-fib (HCC) with rvr  This occurred after admission in the setting of his sepsis and acute cholecystitis.  He has gone in and out of atrial fibrillation, but currently in rapid atrial fibrillation.  IV amiodarone and IV metoprolol, while NPO.  He has converted back to sinus rhythm.    At this point we will discontinue amiodarone drip and start amiodarone 200 mg 3 times daily.  We will continue this at least temporarily into the outpatient setting.  We will switch his IV metoprolol back to his outpatient regimen which is metoprolol 25 mg twice daily.  Essential hypertension  He was on metoprolol for this as an outpatient, which was originally started for palpitations many years ago.  Right hip pain  Secondary to his syncope and fall.  Elevated serum creatinine  Associated with hypotension of sepsis.  Syncopal episodes  Associated with his hypotension, sepsis and acute cholecystitis.  Cholecystitis  Antibiotics per primary team.  S/P IR guided cholecystostomy tube.  RLS (restless legs syndrome)    Anxiety      Subjective:   Patient seen and examined.  Converted back to sinus rhythm overnight.  Remains on IV amiodarone drip.  Diet advanced.    Objective:     Vitals: Blood pressure 100/58, pulse 71, temperature 98.9 °F (37.2 °C), resp. rate 15, height 5' 7\" (1.702 m), weight 76.6 kg (168 lb 14 oz), SpO2 91%., Body mass index is 26.45 kg/m².,   Orthostatic Blood Pressures      Flowsheet Row Most Recent Value   Blood Pressure 100/58 filed at 02/22/2025 0942   Patient Position - Orthostatic VS Lying filed " at 02/21/2025 2356              Intake/Output Summary (Last 24 hours) at 2/22/2025 1131  Last data filed at 2/22/2025 0402  Gross per 24 hour   Intake 1014.56 ml   Output 1715 ml   Net -700.44 ml       Laboratory reviewed: Atrial fibrillation converting to sinus rhythm overnight.  Currently sinus rhythm.      Physical Exam:    GEN: Devin Cutler appears well, alert and oriented x 3, pleasant and cooperative   HEENT: pupils equal, round, and reactive to light; extraocular muscles intact  NECK: supple, no carotid bruits   HEART: regular rhythm, normal S1 and S2, soft systolic murmur, no clicks, gallops or rubs   LUNGS: clear to auscultation bilaterally; no wheezes, rales, or rhonchi   ABDOMEN: normal bowel sounds, soft, no tenderness, no distention  EXTREMITIES: peripheral pulses normal; no clubbing, cyanosis, or edema  NEURO: no focal findings   SKIN: normal without suspicious lesions on exposed skin    Medications:      Current Facility-Administered Medications:     acetaminophen (TYLENOL) tablet 650 mg, 650 mg, Oral, Q4H PRN, Estela Carlson DO, 650 mg at 02/21/25 1415    ALPRAZolam (XANAX) tablet 0.5 mg, 0.5 mg, Oral, HS PRN, Estela Carlson DO, 0.5 mg at 02/19/25 2151    amiodarone tablet 200 mg, 200 mg, Oral, TID With Meals, Mello Ness MD    benzonatate (TESSALON PERLES) capsule 200 mg, 200 mg, Oral, TID, Brett Palacio MD, 200 mg at 02/22/25 0852    ceftriaxone (ROCEPHIN) 2 g/50 mL in dextrose IVPB, 2,000 mg, Intravenous, Q24H, Juanita Gonzalez MD, Last Rate: 100 mL/hr at 02/21/25 1727, 2,000 mg at 02/21/25 1727    Cholecalciferol (VITAMIN D3) tablet 1,000 Units, 1,000 Units, Oral, Daily, Estela Carlson DO, 1,000 Units at 02/22/25 0853    [Held by provider] Diclofenac Sodium (VOLTAREN) 1 % topical gel 2 g, 2 g, Topical, 4x Daily, Estela Carlson,     folic acid 1 mg, thiamine (VITAMIN B1) 100 mg in sodium chloride 0.9 % 100 mL IV piggyback, , Intravenous, Daily, Juanita Gonzalez,  MD, Last Rate: 200 mL/hr at 02/21/25 1538, New Bag at 02/22/25 1110    guaiFENesin (MUCINEX) 12 hr tablet 1,200 mg, 1,200 mg, Oral, BID, Estela Carlson DO, 1,200 mg at 02/22/25 0852    heparin (porcine) subcutaneous injection 5,000 Units, 5,000 Units, Subcutaneous, Q8H LITO, 5,000 Units at 02/22/25 0544 **AND** Platelet count, , , Once, Estela Carlson DO    HYDROcodone Bit-[place] MBr (HYCODAN) oral syrup 5 mL, 5 mL, Oral, Q4H PRN, Brett Palacio MD, 5 mL at 02/21/25 1527    levothyroxine tablet 25 mcg, 25 mcg, Oral, Early Morning, Estela Carlson DO, 25 mcg at 02/22/25 0544    lidocaine (LIDODERM) 5 % patch 1 patch, 1 patch, Topical, Daily, Estela Carlson DO, 1 patch at 02/21/25 0949    metoprolol tartrate (LOPRESSOR) tablet 25 mg, 25 mg, Oral, Q12H LITO, Mello Ness MD    metroNIDAZOLE (FLAGYL) tablet 500 mg, 500 mg, Oral, Q8H LITO, Nickolas Sargent MD, 500 mg at 02/22/25 0544    multivitamin-minerals (CENTRUM) tablet 1 tablet, 1 tablet, Oral, Daily, Estela Carlson DO, 1 tablet at 02/22/25 0852    ondansetron (ZOFRAN-ODT) dispersible tablet 4 mg, 4 mg, Oral, Q6H PRN, Mundo Johnson MD, 4 mg at 02/21/25 1738    pantoprazole (PROTONIX) EC tablet 40 mg, 40 mg, Oral, Early Morning, Estela Carlson DO, 40 mg at 02/22/25 0544    rOPINIRole (REQUIP) tablet 0.5 mg, 0.5 mg, Oral, TID, Estela Carlson DO, 0.5 mg at 02/22/25 0853     Labs & Results:        Results from last 7 days   Lab Units 02/22/25  0353 02/21/25  0552 02/20/25  0629   WBC Thousand/uL 10.16 14.30* 16.79*   HEMOGLOBIN g/dL 11.8* 12.4 12.5   HEMATOCRIT % 34.8* 36.0* 36.5   PLATELETS Thousands/uL 148* 144* 142*         Results from last 7 days   Lab Units 02/22/25  0339 02/21/25  1653 02/20/25  1549   POTASSIUM mmol/L 3.8 3.7 3.9   CHLORIDE mmol/L 102 101 99   CO2 mmol/L 22 21 26   BUN mg/dL 17 20 22   CREATININE mg/dL 0.84 0.74 1.08   CALCIUM mg/dL 7.5* 7.6* 8.5   ALK PHOS U/L 74 89 83   ALT U/L  25 26 31   AST U/L 31 29 42*     Results from last 7 days   Lab Units 02/19/25  0542   INR  1.36*     Results from last 7 days   Lab Units 02/22/25  0353 02/19/25  1928   MAGNESIUM mg/dL 2.1 1.7*         EKG personally reviewed by Mello Ness MD. Atrial fibrillation with rapid ventricular response.    ECHO:    Left Ventricle: Left ventricular cavity size is normal. Wall thickness is normal. The left ventricular ejection fraction is 67% by biplane measurement. Systolic function is hyperdynamic. Wall motion cannot be accurately assessed. Diastolic function is normal.    Right Ventricle: Right ventricular cavity size is mildly dilated.    Aortic Valve: There is aortic valve sclerosis.       Counseling / Coordination of Care  Total floor / unit time spent today 25 minutes.  Greater than 50% of total time was spent with the patient and / or family counseling and / or coordination of care.

## 2025-02-22 NOTE — ASSESSMENT & PLAN NOTE
WBC   Date Value Ref Range Status   02/22/2025 10.16 4.31 - 10.16 Thousand/uL Final   02/21/2025 14.30 (H) 4.31 - 10.16 Thousand/uL Final   02/20/2025 16.79 (H) 4.31 - 10.16 Thousand/uL Final     LACTIC ACID   Date Value Ref Range Status   02/19/2025 2.2 (H) 0.5 - 2.0 mmol/L Final   02/19/2025 3.1 (H) 0.5 - 2.0 mmol/L Final     Lab Results   Component Value Date    BLOODCX Escherichia coli (A) 02/18/2025    BLOODCX Escherichia coli (A) 02/18/2025     CT A/P: Over distended gallbladder reflecting Paula cystitis.  Loculated hypodense lesion in adjacent liver not fully characterized by concerning for pericholecystic abscess  CXR negative  CT head showed possible acute right maxillary sinusitis     Assessment: Blood culture growing E. coli suspect translocation from cholecystitis.  Underwent successful cholecystostomy tube placement 2/21.  Will monitor on IV antibiotics overnight, may be stable for DC Monday, will need to wean off amiodarone drip.    Plan:  Continue ceftriaxone 2g q24h d5  Flagyl 500 mg tid d4  Follow-up body fluid culture

## 2025-02-22 NOTE — ASSESSMENT & PLAN NOTE
This occurred after admission in the setting of his sepsis and acute cholecystitis.  He has gone in and out of atrial fibrillation, but currently in rapid atrial fibrillation.  IV amiodarone and IV metoprolol, while NPO.  He has converted back to sinus rhythm.    At this point we will discontinue amiodarone drip and start amiodarone 200 mg 3 times daily.  We will continue this at least temporarily into the outpatient setting.  We will switch his IV metoprolol back to his outpatient regimen which is metoprolol 25 mg twice daily.

## 2025-02-22 NOTE — ASSESSMENT & PLAN NOTE
80-year-old male with imaging evidence of acute cholecystitis complicated by possible pericholecystic abscess in liver, clinically improving    S/p IR perc tameka tube on 2/21    Plan  - Diet as tolerated  - Continue IV antibiotics, duration pending repeat imaging  - Repeat CT AP on 2/23 to evaluate for drainage of liver abscess  - Monitor and replete electrolytes as needed  - Monitor and record drain output quantity and quality  - Multimodal pain regimen  - Encourage ambulation/out of bed, 3 times daily  - Encourage incentive spirometer use  - PT OT recommending level 3 rehabilitation  - Rest of care per primary team    Results from last 7 days   Lab Units 02/22/25  0353 02/21/25  0552 02/20/25  0629   WBC Thousand/uL 10.16 14.30* 16.79*

## 2025-02-22 NOTE — PLAN OF CARE
Problem: PAIN - ADULT  Goal: Verbalizes/displays adequate comfort level or baseline comfort level  Description: Interventions:  - Encourage patient to monitor pain and request assistance  - Assess pain using appropriate pain scale  - Administer analgesics based on type and severity of pain and evaluate response  - Implement non-pharmacological measures as appropriate and evaluate response  - Consider cultural and social influences on pain and pain management  - Notify physician/advanced practitioner if interventions unsuccessful or patient reports new pain  Outcome: Progressing     Problem: INFECTION - ADULT  Goal: Absence or prevention of progression during hospitalization  Description: INTERVENTIONS:  - Assess and monitor for signs and symptoms of infection  - Monitor lab/diagnostic results  - Monitor all insertion sites, i.e. indwelling lines, tubes, and drains  - Monitor endotracheal if appropriate and nasal secretions for changes in amount and color  - [place] appropriate cooling/warming therapies per order  - Administer medications as ordered  - Instruct and encourage patient and family to use good hand hygiene technique  - Identify and instruct in appropriate isolation precautions for identified infection/condition  Outcome: Progressing

## 2025-02-23 ENCOUNTER — APPOINTMENT (INPATIENT)
Dept: CT IMAGING | Facility: HOSPITAL | Age: 80
DRG: 871 | End: 2025-02-23
Payer: COMMERCIAL

## 2025-02-23 ENCOUNTER — HOME HEALTH ADMISSION (OUTPATIENT)
Dept: HOME HEALTH SERVICES | Facility: HOME HEALTHCARE | Age: 80
End: 2025-02-23
Payer: COMMERCIAL

## 2025-02-23 VITALS
HEIGHT: 67 IN | TEMPERATURE: 98 F | BODY MASS INDEX: 26.09 KG/M2 | HEART RATE: 77 BPM | RESPIRATION RATE: 20 BRPM | OXYGEN SATURATION: 96 % | DIASTOLIC BLOOD PRESSURE: 62 MMHG | SYSTOLIC BLOOD PRESSURE: 110 MMHG | WEIGHT: 166.23 LBS

## 2025-02-23 PROBLEM — K75.0 LIVER ABSCESS: Status: ACTIVE | Noted: 2025-02-23

## 2025-02-23 PROBLEM — R78.81 BACTEREMIA: Status: ACTIVE | Noted: 2025-02-23

## 2025-02-23 PROBLEM — R55 SYNCOPAL EPISODES: Status: RESOLVED | Noted: 2025-02-19 | Resolved: 2025-02-23

## 2025-02-23 PROBLEM — B96.20 E COLI BACTEREMIA: Status: ACTIVE | Noted: 2025-02-23

## 2025-02-23 LAB
ALBUMIN SERPL BCG-MCNC: 2.6 G/DL (ref 3.5–5)
ALP SERPL-CCNC: 64 U/L (ref 34–104)
ALT SERPL W P-5'-P-CCNC: 26 U/L (ref 7–52)
ANION GAP SERPL CALCULATED.3IONS-SCNC: 6 MMOL/L (ref 4–13)
AST SERPL W P-5'-P-CCNC: 30 U/L (ref 13–39)
BACTERIA SPEC ANAEROBE CULT: NORMAL
BACTERIA SPEC BFLD CULT: ABNORMAL
BASOPHILS # BLD AUTO: 0.06 THOUSANDS/ÂΜL (ref 0–0.1)
BASOPHILS NFR BLD AUTO: 1 % (ref 0–1)
BILIRUB SERPL-MCNC: 0.57 MG/DL (ref 0.2–1)
BUN SERPL-MCNC: 14 MG/DL (ref 5–25)
CALCIUM ALBUM COR SERPL-MCNC: 9.1 MG/DL (ref 8.3–10.1)
CALCIUM SERPL-MCNC: 8 MG/DL (ref 8.4–10.2)
CHLORIDE SERPL-SCNC: 103 MMOL/L (ref 96–108)
CO2 SERPL-SCNC: 26 MMOL/L (ref 21–32)
CREAT SERPL-MCNC: 0.89 MG/DL (ref 0.6–1.3)
EOSINOPHIL # BLD AUTO: 0.07 THOUSAND/ÂΜL (ref 0–0.61)
EOSINOPHIL NFR BLD AUTO: 1 % (ref 0–6)
ERYTHROCYTE [DISTWIDTH] IN BLOOD BY AUTOMATED COUNT: 13.7 % (ref 11.6–15.1)
GFR SERPL CREATININE-BSD FRML MDRD: 81 ML/MIN/1.73SQ M
GLUCOSE SERPL-MCNC: 86 MG/DL (ref 65–140)
GRAM STN SPEC: ABNORMAL
HCT VFR BLD AUTO: 37 % (ref 36.5–49.3)
HGB BLD-MCNC: 12.6 G/DL (ref 12–17)
IMM GRANULOCYTES # BLD AUTO: 0.17 THOUSAND/UL (ref 0–0.2)
IMM GRANULOCYTES NFR BLD AUTO: 2 % (ref 0–2)
LYMPHOCYTES # BLD AUTO: 0.9 THOUSANDS/ÂΜL (ref 0.6–4.47)
LYMPHOCYTES NFR BLD AUTO: 9 % (ref 14–44)
MCH RBC QN AUTO: 30.3 PG (ref 26.8–34.3)
MCHC RBC AUTO-ENTMCNC: 34.1 G/DL (ref 31.4–37.4)
MCV RBC AUTO: 89 FL (ref 82–98)
MONOCYTES # BLD AUTO: 0.99 THOUSAND/ÂΜL (ref 0.17–1.22)
MONOCYTES NFR BLD AUTO: 10 % (ref 4–12)
NEUTROPHILS # BLD AUTO: 7.96 THOUSANDS/ÂΜL (ref 1.85–7.62)
NEUTS SEG NFR BLD AUTO: 77 % (ref 43–75)
NRBC BLD AUTO-RTO: 0 /100 WBCS
PLATELET # BLD AUTO: 192 THOUSANDS/UL (ref 149–390)
PMV BLD AUTO: 9.8 FL (ref 8.9–12.7)
POTASSIUM SERPL-SCNC: 3.6 MMOL/L (ref 3.5–5.3)
PROT SERPL-MCNC: 5.6 G/DL (ref 6.4–8.4)
RBC # BLD AUTO: 4.16 MILLION/UL (ref 3.88–5.62)
SODIUM SERPL-SCNC: 135 MMOL/L (ref 135–147)
WBC # BLD AUTO: 10.15 THOUSAND/UL (ref 4.31–10.16)

## 2025-02-23 PROCEDURE — 99239 HOSP IP/OBS DSCHRG MGMT >30: CPT | Performed by: HOSPITALIST

## 2025-02-23 PROCEDURE — 99223 1ST HOSP IP/OBS HIGH 75: CPT | Performed by: INTERNAL MEDICINE

## 2025-02-23 PROCEDURE — 99232 SBSQ HOSP IP/OBS MODERATE 35: CPT | Performed by: INTERNAL MEDICINE

## 2025-02-23 PROCEDURE — 85025 COMPLETE CBC W/AUTO DIFF WBC: CPT

## 2025-02-23 PROCEDURE — 80053 COMPREHEN METABOLIC PANEL: CPT

## 2025-02-23 PROCEDURE — G0545 PR INHERENT VISIT TO INPT: HCPCS | Performed by: INTERNAL MEDICINE

## 2025-02-23 PROCEDURE — 99232 SBSQ HOSP IP/OBS MODERATE 35: CPT | Performed by: SURGERY

## 2025-02-23 PROCEDURE — 74177 CT ABD & PELVIS W/CONTRAST: CPT

## 2025-02-23 PROCEDURE — 0F9430Z DRAINAGE OF GALLBLADDER WITH DRAINAGE DEVICE, PERCUTANEOUS APPROACH: ICD-10-PCS | Performed by: RADIOLOGY

## 2025-02-23 RX ORDER — CEFPODOXIME PROXETIL 200 MG/1
400 TABLET, FILM COATED ORAL 2 TIMES DAILY WITH MEALS
Status: DISCONTINUED | OUTPATIENT
Start: 2025-02-23 | End: 2025-02-23 | Stop reason: HOSPADM

## 2025-02-23 RX ORDER — METRONIDAZOLE 500 MG/1
500 TABLET ORAL EVERY 12 HOURS SCHEDULED
Status: DISCONTINUED | OUTPATIENT
Start: 2025-02-23 | End: 2025-02-23 | Stop reason: HOSPADM

## 2025-02-23 RX ORDER — METRONIDAZOLE 500 MG/1
500 TABLET ORAL EVERY 12 HOURS SCHEDULED
Qty: 60 TABLET | Refills: 0 | Status: SHIPPED | OUTPATIENT
Start: 2025-02-23 | End: 2025-03-25

## 2025-02-23 RX ORDER — AMIODARONE HYDROCHLORIDE 200 MG/1
TABLET ORAL
Qty: 65 TABLET | Refills: 0 | Status: SHIPPED | OUTPATIENT
Start: 2025-02-23 | End: 2025-04-08

## 2025-02-23 RX ORDER — CEFPODOXIME PROXETIL 200 MG/1
400 TABLET, FILM COATED ORAL 2 TIMES DAILY WITH MEALS
Qty: 120 TABLET | Refills: 0 | Status: SHIPPED | OUTPATIENT
Start: 2025-02-23 | End: 2025-03-06 | Stop reason: SDUPTHER

## 2025-02-23 RX ADMIN — LEVOTHYROXINE SODIUM 25 MCG: 0.03 TABLET ORAL at 06:44

## 2025-02-23 RX ADMIN — CEFPODOXIME PROXETIL 400 MG: 200 TABLET, FILM COATED ORAL at 16:44

## 2025-02-23 RX ADMIN — IOHEXOL 85 ML: 350 INJECTION, SOLUTION INTRAVENOUS at 08:08

## 2025-02-23 RX ADMIN — GUAIFENESIN 1200 MG: 600 TABLET ORAL at 17:00

## 2025-02-23 RX ADMIN — THIAMINE HYDROCHLORIDE: 100 INJECTION, SOLUTION INTRAMUSCULAR; INTRAVENOUS at 09:54

## 2025-02-23 RX ADMIN — ROPINIROLE 0.5 MG: 0.25 TABLET, FILM COATED ORAL at 14:36

## 2025-02-23 RX ADMIN — METOPROLOL TARTRATE 25 MG: 25 TABLET, FILM COATED ORAL at 09:52

## 2025-02-23 RX ADMIN — METRONIDAZOLE 500 MG: 500 TABLET ORAL at 06:44

## 2025-02-23 RX ADMIN — LIDOCAINE 1 PATCH: 50 PATCH CUTANEOUS at 09:55

## 2025-02-23 RX ADMIN — APIXABAN 5 MG: 5 TABLET, FILM COATED ORAL at 14:35

## 2025-02-23 RX ADMIN — METRONIDAZOLE 500 MG: 500 TABLET ORAL at 17:00

## 2025-02-23 RX ADMIN — BENZONATATE 200 MG: 100 CAPSULE ORAL at 09:52

## 2025-02-23 RX ADMIN — AMIODARONE HYDROCHLORIDE 200 MG: 200 TABLET ORAL at 09:52

## 2025-02-23 RX ADMIN — ROPINIROLE 0.5 MG: 0.25 TABLET, FILM COATED ORAL at 09:52

## 2025-02-23 RX ADMIN — METOPROLOL TARTRATE 25 MG: 25 TABLET, FILM COATED ORAL at 00:09

## 2025-02-23 RX ADMIN — GUAIFENESIN 1200 MG: 600 TABLET ORAL at 09:52

## 2025-02-23 RX ADMIN — AMIODARONE HYDROCHLORIDE 200 MG: 200 TABLET ORAL at 14:35

## 2025-02-23 RX ADMIN — Medication 1000 UNITS: at 09:52

## 2025-02-23 RX ADMIN — MULTIPLE VITAMINS W/ MINERALS TAB 1 TABLET: TAB ORAL at 09:52

## 2025-02-23 RX ADMIN — BENZONATATE 200 MG: 100 CAPSULE ORAL at 14:36

## 2025-02-23 RX ADMIN — PANTOPRAZOLE SODIUM 40 MG: 40 TABLET, DELAYED RELEASE ORAL at 06:44

## 2025-02-23 RX ADMIN — HEPARIN SODIUM 5000 UNITS: 5000 INJECTION INTRAVENOUS; SUBCUTANEOUS at 06:44

## 2025-02-23 NOTE — ASSESSMENT & PLAN NOTE
Recent Labs     02/21/25  1653 02/22/25  0339 02/23/25  0653   AST 29 31 30   ALT 26 25 26   ALKPHOS 89 74 64   TBILI 0.95 0.66 0.57     UA: positive for urobilinogen  History of drinking about 9 drinks per week (3 beers three times per week while playing pole)  No abdominal pain, N/V. Abdomen nontender on exam. Suspect possibly related to sepsis. Hemoglobin normal, low suspicion due to hemolytic anemia at this time.    Suspect secondary to sepsis and cholecystitis     Plan:  F/u with pcp

## 2025-02-23 NOTE — ASSESSMENT & PLAN NOTE
Initial Blood pressure on admission 72/40. Suspect dehydration/hypotension 2/2 sepsis 2/2 PNA.  S/p 1L NSS IVF bolus  Home regimen: Metoprolol Tartrate 25mg BID    Plan:  F/u with pcp

## 2025-02-23 NOTE — ASSESSMENT & PLAN NOTE
Likely source of patient's presentation and syncopal episode.  Now status post cholecystostomy tube placement with IR.  Patient has further complications with liver abscess as below.  Antibiotics as below  Trend fever curve/WBC while admitted  Monitor CBC/chemistry while admitted  Outpatient lab monitoring and follow-up as below

## 2025-02-23 NOTE — ASSESSMENT & PLAN NOTE
Noted on patient's initial imaging.  He underwent IR cholecystostomy tube placement on 2/21.  Cultures with pan susceptible E. coli.  He initially presented with bacteremia with E. coli with slightly more resistance present on susceptibilities.  Patient was not recommended for second IR drain given the size of this collection.  Repeat CT confirms persistence post cholecystostomy tube.  We again reviewed extensively plan for oral antibiotics until radiographic resolution.  Transition to Vantin 400 mg twice daily  Transition to Flagyl 500 mg twice daily  Plan for weekly labs with CBCD and CMP as an outpatient  Alcohol avoidance discussed  Plan for repeat CT imaging of the abdomen and pelvis with contrast in 4 weeks  Plan on continuing antibiotics until radiographic resolution of patient's abscess  Will plan for follow-up in the ID office 1 week after imaging completed  ID office staff notified of the above follow-up needs  Patient will need continued follow-up with surgery and eventual cholecystectomy  Ongoing follow-up with IR in terms of cholecystostomy tube  Additional supportive care/discharge per primary

## 2025-02-23 NOTE — PLAN OF CARE
Problem: PAIN - ADULT  Goal: Verbalizes/displays adequate comfort level or baseline comfort level  Description: Interventions:  - Encourage patient to monitor pain and request assistance  - Assess pain using appropriate pain scale  - Administer analgesics based on type and severity of pain and evaluate response  - Implement non-pharmacological measures as appropriate and evaluate response  - Consider cultural and social influences on pain and pain management  - Notify physician/advanced practitioner if interventions unsuccessful or patient reports new pain  Outcome: Progressing     Problem: INFECTION - ADULT  Goal: Absence or prevention of progression during hospitalization  Description: INTERVENTIONS:  - Assess and monitor for signs and symptoms of infection  - Monitor lab/diagnostic results  - Monitor all insertion sites, i.e. indwelling lines, tubes, and drains  - Monitor endotracheal if appropriate and nasal secretions for changes in amount and color  - [place] appropriate cooling/warming therapies per order  - Administer medications as ordered  - Instruct and encourage patient and family to use good hand hygiene technique  - Identify and instruct in appropriate isolation precautions for identified infection/condition  Outcome: Progressing     Problem: Knowledge Deficit  Goal: Patient/family/caregiver demonstrates understanding of disease process, treatment plan, medications, and discharge instructions  Description: Complete learning assessment and assess knowledge base.  Interventions:  - Provide teaching at level of understanding  - Provide teaching via preferred learning methods  Outcome: Progressing

## 2025-02-23 NOTE — PROGRESS NOTES
Progress Note - Surgery-General   Name: Devin Cutler 80 y.o. male I MRN: [id number]  Unit/Bed#: S -01 I Date of Admission: 2/18/2025   Date of Service: 2/23/2025 I Hospital Day: 5    Assessment & Plan  Sepsis (HCC)  See below  Hyperbilirubinemia  Tbili elevated to 3.5 on admission, down-trending, now 1.04  Likely in the setting of acute cholecystitis  RUQ us: sludge with wall thickening. Some gravel might be present. No common bile duct dilation.    See below  Trend LFTs  Cholecystitis  80-year-old male with imaging evidence of acute cholecystitis complicated by possible pericholecystic abscess in liver, clinically improving    S/p IR perc tameka tube on 2/21    Plan  - Diet as tolerated  - Continue IV antibiotics, duration pending repeat imaging  - Repeat CT AP on 2/23 to evaluate for drainage of liver abscess  - Monitor and replete electrolytes as needed  - Monitor and record drain output quantity and quality  - Multimodal pain regimen  - Encourage ambulation/out of bed, 3 times daily  - Encourage incentive spirometer use  - PT OT recommending level 3 rehabilitation  - Rest of care per primary team    Results from last 7 days   Lab Units 02/22/25  0353 02/21/25  0552 02/20/25  0629   WBC Thousand/uL 10.16 14.30* 16.79*             Subjective   No subjective complaints.  No pain.  No nausea no vomiting.  Having bowel movements.    Objective :  Temp:  [97.9 °F (36.6 °C)-98.3 °F (36.8 °C)] 98.3 °F (36.8 °C)  HR:  [68-81] 71  BP: ()/(56-64) 100/62  Resp:  [18] 18  SpO2:  [91 %-96 %] 94 %  O2 Device: None (Room air)    I/O         02/21 0701  02/22 0700 02/22 0701  02/23 0700 02/23 0701 02/24 0700    P.O. 290 900     I.V. (mL/kg) 774.6 (10.1)      NG/GT 0 30     Total Intake(mL/kg) 1064.6 (13.9) 930 (12.3)     Urine (mL/kg/hr) 1360 (0.7) 2670 (1.5)     Emesis/NG output 355 300     Stool  0     Total Output 1715 2970     Net -650.4 -2040            Unmeasured Stool Occurrence  1 x            Lines/Drains/Airways       Active Status       Name Placement date Placement time Site Days    Cholecystostomy Tube 02/21/25  1347  -- 1                  Physical Exam  Vitals and nursing note reviewed.   Constitutional:       General: He is not in acute distress.     Appearance: He is well-developed.   HENT:      Head: Normocephalic and atraumatic.   Eyes:      Conjunctiva/sclera: Conjunctivae normal.   Cardiovascular:      Rate and Rhythm: Normal rate and regular rhythm.      Heart sounds: No murmur heard.  Pulmonary:      Effort: Pulmonary effort is normal. No respiratory distress.      Breath sounds: Normal breath sounds.   Abdominal:      Palpations: Abdomen is soft.      Tenderness: There is no abdominal tenderness.      Comments: IR PERC Paula bilious   Musculoskeletal:         General: No swelling.      Cervical back: Neck supple.   Skin:     General: Skin is warm and dry.      Capillary Refill: Capillary refill takes less than 2 seconds.   Neurological:      Mental Status: He is alert.   Psychiatric:         Mood and Affect: Mood normal.           Lab Results: I have reviewed the following results:  Recent Labs     02/22/25  0339 02/22/25  0353   WBC  --  10.16   HGB  --  11.8*   HCT  --  34.8*   PLT  --  148*   SODIUM 132*  --    K 3.8  --      --    CO2 22  --    BUN 17  --    CREATININE 0.84  --    GLUC 92  --    MG  --  2.1   AST 31  --    ALT 25  --    ALB 2.5*  --    TBILI 0.66  --    ALKPHOS 74  --

## 2025-02-23 NOTE — CASE MANAGEMENT
Case Management Discharge Planning Note    Patient name Devin Zamora S /S -01 MRN [id number]  : 1945 Date 2025       Current Admission Date: 2025  Current Admission Diagnosis:Sepsis (HCC)   Patient Active Problem List    Diagnosis Date Noted Date Diagnosed    E coli bacteremia 2025     Liver abscess 2025     A-fib (HCC) with rvr 2025     Cholecystitis 2025     Sepsis (HCC) 2025     URI symptoms 2025     Elevated serum creatinine 2025     Hyperbilirubinemia 2025     Hypothyroidism due to acquired atrophy of thyroid 2024     Right hip pain 2021     GERD (gastroesophageal reflux disease)      RLS (restless legs syndrome) 10/22/2020     Essential hypertension 10/22/2020     Anxiety 10/22/2020       LOS (days): 5  Geometric Mean LOS (GMLOS) (days): 4.9  Days to GMLOS:0.3     OBJECTIVE:  Risk of Unplanned Readmission Score: 8.59         Current admission status: Inpatient   Preferred Pharmacy:   [id number] - [place] - [address]  [address] [address]  Phone: [phone] Fax: [phone]    [facility] - [facility] [address]  [address] [address]  Phone: [phone] Fax: [phone]    [facility] - [place] - [address]  [address] [place] [address]  Phone: [phone] Fax: [phone]    Primary Care Provider: Momo Trinidad MD    Primary Insurance: [employer]  Secondary Insurance:     DISCHARGE DETAILS:  CM spoke with patient at the bedside. Patient planned for discharge today per SLIM. Discussed with patient Home HHC SN for choley tube at discharge. Patient requesting referrals for HHC SN at discharge. Patient updated CM will send referrals and follow up with available options. Patient open to teaching on how to care for choley tube. Nursing at bedside.      Patient may also have a daughter/family member that could also assist with flushing tube.     CM sent referrals via [facility] to [facility] agencies for SN. Pending review.

## 2025-02-23 NOTE — CONSULTS
Consultation - Infectious Disease   Name: Devin Cutler 80 y.o. male I MRN: [id number]  Unit/Bed#: S -01 I Date of Admission: 2/18/2025   Date of Service: 2/23/2025 I Hospital Day: 5   Inpatient consult to Infectious Diseases  Consult performed by: Sujey Bustos MD  Consult ordered by: Juanita Gonzalez MD      Inpatient consult to Infectious Diseases  Consult performed by: Sujey Bustos MD  Consult ordered by: Hilario Ruff MD        Physician Requesting Evaluation: Nickolas Sargent MD   Reason for Evaluation / Principal Problem: Intra-abdominal abscess    Assessment & Plan  Sepsis (HCC)  Met criteria early on in admission.  This is due to patient's E. coli bacteremia, cholecystitis and liver abscess.  Clinical parameters overall improved.  Antibiotics adjusted as below  Anticipate prolonged antibiotic course until radiographic resolution of abscess  Continue to trend fever curve/vitals while admitted  Monitor CBC/chemistry while admitted  Outpatient lab monitoring and follow-up and imaging as below  Additional supportive care as per primary  Patient reportedly is planned for discharge today.  E coli bacteremia  2 out of 2 blood cultures isolated E. coli.  Susceptibility pattern noted.  Suspect that this is related to his cholecystitis and likely liver abscess development.  Patient is tolerating current antibiotics.  EKG without significantly prolonged QTc.  We discussed transition to oral antibiotic until radiographic resolution of his abscess on imaging.  We discussed weekly lab monitoring, follow-up after imaging completed, avoidance of alcohol and timeline in terms of repeat imaging.  Patient is also plan for follow-up with surgery for eventual cholecystectomy.  Antibiotics adjusted as below  Continue to trend fever curve/vitals while admitted  Monitor CBC/chemistry while admitted  Outpatient lab monitoring and imaging as below  Chronic follow-up with surgery  Additional supportive care/discharge  per primary  Cholecystitis  Likely source of patient's presentation and syncopal episode.  Now status post cholecystostomy tube placement with IR.  Patient has further complications with liver abscess as below.  Antibiotics as below  Trend fever curve/WBC while admitted  Monitor CBC/chemistry while admitted  Outpatient lab monitoring and follow-up as below  Liver abscess  Noted on patient's initial imaging.  He underwent IR cholecystostomy tube placement on 2/21.  Cultures with pan susceptible E. coli.  He initially presented with bacteremia with E. coli with slightly more resistance present on susceptibilities.  Patient was not recommended for second IR drain given the size of this collection.  Repeat CT confirms persistence post cholecystostomy tube.  We again reviewed extensively plan for oral antibiotics until radiographic resolution.  Transition to Vantin 400 mg twice daily  Transition to Flagyl 500 mg twice daily  Plan for weekly labs with CBCD and CMP as an outpatient  Alcohol avoidance discussed  Plan for repeat CT imaging of the abdomen and pelvis with contrast in 4 weeks  Plan on continuing antibiotics until radiographic resolution of patient's abscess  Will plan for follow-up in the ID office 1 week after imaging completed  ID office staff notified of the above follow-up needs  Patient will need continued follow-up with surgery and eventual cholecystectomy  Ongoing follow-up with IR in terms of cholecystostomy tube  Additional supportive care/discharge per primary    Above plan discussed in detail with the patient at bedside  Above plan discussed in detail with nursing  Above plan discussed in detail with primary service resident who is aware of plans for transition to oral antibiotics and clearance otherwise from an ID standpoint with plans for outpatient follow-up.    Given intended plans for discharge today, ID consult service will sign off at this time.  Please call if questions.    I have spent a  total time of 80 minutes on 02/23/25 in caring for this patient including Diagnostic results, Prognosis, Instructions for management, Impressions, Documenting in the medical record, Reviewing/placing orders in the medical record (including tests, medications, and/or procedures), Obtaining or reviewing history  , Communicating with other healthcare professionals , and coordinating outpatient follow-up plans with ID office .     HISTORY OF PRESENT ILLNESS:  HPI: Devin Cutler is a 80 y.o. year old male with GERD and no extensive prior hospitalizations.  Patient is a  who served in [place].  [facility] and is being worked up for prior agent orange exposure/his thyroid.  He presented this time for syncopal episode.  He had been otherwise well, no progressive abdominal symptoms, nausea, vomiting, chest pain or shortness of breath.  No real changes to appetite or fever otherwise.  On presentation he was found to be hypotensive and had leukocytosis and an RANDAL.  He started to improve with antibiotics and initially the thought was for pneumonia.  He ultimately underwent CT imaging of the abdomen and pelvis and was found to have a distended gallbladder with bladder wall thickening and pericholecystic fluid along with a lobulated hypodense lesion in the adjacent liver concerning for a pericholecystic abscess.  Over the course of this admission he was seen by surgery and IR and ultimately underwent cholecystostomy tube placement.  Patient reports overall that he felt better but most after the drain went in.  On presentation he was bacteremic.  PCR results reviewed along with susceptibility pattern.  Fluid was obtained during cholecystostomy tube placement with E. coli as well.  Patient underwent repeat CT imaging of the abdomen and pelvis now post IR drain and essentially he has an unchanged intrahepatic abscess.  He currently denies having any nausea, vomiting, chest pain or shortness  of breath.  Seems to be have been tolerating antibiotics.  White count has since downtrended.  Vitals are otherwise stable.  We discussed in detail findings on his imaging and need essentially for more prolonged course of antibiotic given intrahepatic abscess that remains.  We discussed likelihood of repeat imaging as an outpatient to track progress and determine final duration of antibiotics.  Patient is also scheduled with follow-up with general surgery for eventual removal of his gallbladder as well.  We reviewed antibiotic agents, reasoning behind the agents, weekly labs needed as well as follow-up in our office.  He lives in the [facility] and could follow-up there.  I will him know that I would let our office know about plans for follow-up, weekly labs.  I let the patient know that he is to continue antibiotics until we see that his abscess is gone.  We discussed also alcohol abstinence while on these antibiotics.  Additional questions were answered.  We are consulted at this time for further assistance with management.    REVIEW OF SYSTEMS:  A complete 12 point system-based review of systems is negative other than that noted in the HPI.    PAST MEDICAL HISTORY:  Past Medical History:   Diagnosis Date    GERD (gastroesophageal reflux disease)     Right hip pain 4/22/2021     Past Surgical History:   Procedure Laterality Date    IR CHOLECYSTOSTOMY TUBE PLACEMENT  2/21/2025       FAMILY HISTORY:  Non-contributory    SOCIAL HISTORY:  Social History   Social History     Substance and Sexual Activity   Alcohol Use Yes    Comment: occasional     Social History     Substance and Sexual Activity   Drug Use Never    Comment: No use     Social History     Tobacco Use   Smoking Status Former    Types: Cigarettes    Passive exposure: Past   Smokeless Tobacco Never   Tobacco Comments    per Tamia       ALLERGIES:  No Known Allergies    MEDICATIONS:  All current active medications have been reviewed.    PHYSICAL  EXAM:  Temp:  [98.3 °F (36.8 °C)-98.9 °F (37.2 °C)] 98.9 °F (37.2 °C)  HR:  [68-77] 71  Resp:  [18-20] 20  BP: ()/(56-64) 100/62  SpO2:  [91 %-95 %] 94 %  Temp (24hrs), Av.4 °F (36.9 °C), Min:98.3 °F (36.8 °C), Max:98.9 °F (37.2 °C)  Current: Temperature: 98.9 °F (37.2 °C)    Intake/Output Summary (Last 24 hours) at 2025 1406  Last data filed at 2025 0900  Gross per 24 hour   Intake 690 ml   Output 2720 ml   Net -2030 ml       General Appearance:  Appearing well, nontoxic, and in no distress   Head:  Normocephalic, without obvious abnormality, atraumatic   Eyes:  Conjunctiva pink and sclera anicteric, both eyes   Nose: Nares normal, mucosa normal, no drainage   Throat: Oropharynx moist without lesions   Neck: Supple, symmetrical, no adenopathy, no tenderness/mass/nodules   Back:   Symmetric, no curvature, ROM normal, no CVA tenderness   Lungs:   Clear to auscultation bilaterally, respirations unlabored   Chest Wall:  No tenderness or deformity   Heart:  RRR; no murmur, rub or gallop   Abdomen:   Soft, non-tender, non-distended, positive bowel sounds; cholecystostomy tube in place draining bile.   Extremities: No cyanosis, clubbing or edema   Skin: No rashes or lesions. No draining wounds noted.   Lymph nodes: Cervical, supraclavicular nodes normal   Neurologic: Alert and oriented times 3, extremity strength 5/5 and symmetric       LABS, IMAGING, & OTHER STUDIES:  In completing this consult I have performed an extensive review of the medical records in epic including review of the notes, radiographs, and laboratory results as detailed below.     Lab Results:  I have personally reviewed pertinent labs.  Comments/Interpretations: White blood cell count normalized.    Results from last 7 days   Lab Units 25  0653 25  0353 25  0552   WBC Thousand/uL 10.15 10.16 14.30*   HEMOGLOBIN g/dL 12.6 11.8* 12.4   PLATELETS Thousands/uL 192 148* 144*     Results from last 7 days   Lab Units  02/23/25  0653 02/22/25  0339 02/21/25  1653   POTASSIUM mmol/L 3.6 3.8 3.7   CHLORIDE mmol/L 103 102 101   CO2 mmol/L 26 22 21   BUN mg/dL 14 17 20   CREATININE mg/dL 0.89 0.84 0.74   EGFR ml/min/1.73sq m 81 83 87   CALCIUM mg/dL 8.0* 7.5* 7.6*   AST U/L 30 31 29   ALT U/L 26 25 26   ALK PHOS U/L 64 74 89     Results from last 7 days   Lab Units 02/21/25  1350 02/19/25  0105 02/18/25  2145 02/18/25  2138   BLOOD CULTURE   --   --  Escherichia coli* Escherichia coli*   GRAM STAIN RESULT  1+ Gram positive cocci in pairs*  1+ Gram negative rods*  Rare Polys*  --  Gram negative rods* Gram negative rods*   BODY FLUID CULTURE, STERILE  3+ Growth of Escherichia coli*  --   --   --    LEGIONELLA URINARY ANTIGEN   --  Negative  --   --        Imaging Studies:   I have personally reviewed pertinent imaging study reports and images in PACS.  Comments/Interpretations:  Repeat CT imaging noted now post cholecystostomy tube and patient has continued intrahepatic abscess present.    Other Studies:   I have personally reviewed other pertinent reports as below.  Records in CareEverywhere: No recent admissions noted in Care Everywhere    Nursing home/EMS Records: None    Current/Prior Cultures: Patient has not been seen by our service previously.  He does not have any extensive culture data in our system.  He self-reports that he essentially has not had any hospitalizations since he served.

## 2025-02-23 NOTE — CASE MANAGEMENT
Case Management Discharge Planning Note    Patient name Devin FLOYD /S -01 MRN [id number]  : 1945 Date 2025       Current Admission Date: 2025  Current Admission Diagnosis:Sepsis (HCC)   Patient Active Problem List    Diagnosis Date Noted Date Diagnosed    E coli bacteremia 2025     Liver abscess 2025     A-fib (HCC) with rvr 2025     Cholecystitis 2025     Sepsis (HCC) 2025     URI symptoms 2025     Elevated serum creatinine 2025     Hyperbilirubinemia 2025     Hypothyroidism due to acquired atrophy of thyroid 2024     Right hip pain 2021     GERD (gastroesophageal reflux disease)      RLS (restless legs syndrome) 10/22/2020     Essential hypertension 10/22/2020     Anxiety 10/22/2020       LOS (days): 5  Geometric Mean LOS (GMLOS) (days): 4.9  Days to GMLOS:0.2     OBJECTIVE:  Risk of Unplanned Readmission Score: 8.59         Current admission status: Inpatient   Preferred Pharmacy:   [id number] - [place] - [address]  [address] [address]  Phone: [phone] Fax: [phone]    [facility] - [facility] [place] [address]  [address] [address]  Phone: [phone] Fax: [phone]    [facility]) - [place] - [address]  [address] [place] [address]  Phone: [phone] Fax: [phone]    Primary Care Provider: Momo Trinidad MD    Primary Insurance: [email or web address]  Secondary Insurance:     DISCHARGE DETAILS:  [facility] is able to accept with a SOC 48 hrs after discharge.     SLIM and nursing updated.

## 2025-02-23 NOTE — ASSESSMENT & PLAN NOTE
Assessment: Suspect triggered by active pulmonary illness and being septic.  Currently converted back to sinus rhythm on amiodarone drip. Will cross with home metoprolol, initiation of AC will depends on if acute intervention of cholecystitis is indicated.     - amiodarone 200 mg tid x 1wk , followed by bid x1wk, then qd until seen by cardiology  - continue metoprolol

## 2025-02-23 NOTE — PLAN OF CARE

## 2025-02-23 NOTE — ASSESSMENT & PLAN NOTE
Reports productive cough over the past two weeks with significant worsening over the last two days with fatigue and poor PO intake with known sick contacts  CT head noted right maxillary sinus may represent acute sinusitis  CXR negative, however increased rales in RLL on exam. Suspect CXR did not show RLL consolidation due to dehydration.      Assessment: Possible viral infection followed by superimposed bacterial infection. Treating as community acquired PNA    Plan:  Follow up with pcp

## 2025-02-23 NOTE — ASSESSMENT & PLAN NOTE
CT: Overdistended gallbladder with mild wall thickening and pericholecystic fluid, likely reflecting cholecystitis.  Lobulated hypodense lesion in the adjacent liver, not fully characterized but highly concerning for a pericholecystic abscess.     - fu as outpatient with pcp, surgery, ID

## 2025-02-23 NOTE — ASSESSMENT & PLAN NOTE
Met criteria early on in admission.  This is due to patient's E. coli bacteremia, cholecystitis and liver abscess.  Clinical parameters overall improved.  Antibiotics adjusted as below  Anticipate prolonged antibiotic course until radiographic resolution of abscess  Continue to trend fever curve/vitals while admitted  Monitor CBC/chemistry while admitted  Outpatient lab monitoring and follow-up and imaging as below  Additional supportive care as per primary  Patient reportedly is planned for discharge today.

## 2025-02-23 NOTE — PROGRESS NOTES
"Progress Note - Cardiology   Name: Devin Cutler 80 y.o. male I MRN: [id number]  Unit/Bed#: S -01 I Date of Admission: 2/18/2025   Date of Service: 2/23/2025 I Hospital Day: 5     Assessment & Plan  Sepsis (HCC)  Wesley was admitted with syncope associated with hypotension, leukocytosis and sepsis, found to have acute cholecystitis.  He is also being treated for pneumonia/bronchitis.  He is hemodynamically improving.  A-fib (HCC) with rvr  This occurred after admission in the setting of his sepsis and acute cholecystitis.  He has gone in and out of atrial fibrillation, but with IV amiodarone and metoprolol he converted back to sinus rhythm.  He has remained in sinus rhythm since.    Continue oral amiodarone into the outpatient setting.  Recommend 200 mg 3 times a day for 1 week, 200 mg twice daily for 1 week, then daily thereafter.  Continue his outpatient metoprolol of 25 mg twice daily.  We will arrange for outpatient follow-up.  From a cardiac standpoint he can be discharged.  Essential hypertension  He was on metoprolol for this as an outpatient, which was originally started for palpitations many years ago.  Right hip pain  Secondary to his syncope and fall.  Elevated serum creatinine  Associated with hypotension of sepsis.  Syncopal episodes  Associated with his hypotension, sepsis and acute cholecystitis.  Cholecystitis  Antibiotics per primary team.  S/P IR guided cholecystostomy tube.    Subjective:   Patient seen and examined.  Has remained in sinus rhythm.  Tolerating his diet.  Asymptomatic.    Objective:     Vitals: Blood pressure 100/62, pulse 71, temperature 98.9 °F (37.2 °C), temperature source Oral, resp. rate 20, height 5' 7\" (1.702 m), weight 75.4 kg (166 lb 3.6 oz), SpO2 94%., Body mass index is 26.03 kg/m².,   Orthostatic Blood Pressures      Flowsheet Row Most Recent Value   Blood Pressure 100/62 filed at 02/23/2025 0734   Patient Position - Orthostatic VS Sitting filed at 02/23/2025 1100 "              Intake/Output Summary (Last 24 hours) at 2/23/2025 1242  Last data filed at 2/23/2025 0900  Gross per 24 hour   Intake 930 ml   Output 3070 ml   Net -2140 ml       Telemetry reviewed: Sinus rhythm persist since converting out of atrial fibrillation 2 nights ago.    Physical Exam:    GEN: Devin Cutler appears well, alert and oriented x 3, pleasant and cooperative   HEENT: pupils equal, round, and reactive to light; extraocular muscles intact  NECK: supple, no carotid bruits   HEART: regular rhythm, normal S1 and S2, no murmurs, clicks, gallops or rubs   LUNGS: clear to auscultation bilaterally; no wheezes, rales, or rhonchi   ABDOMEN: normal bowel sounds, soft, no tenderness, no distention  EXTREMITIES: peripheral pulses normal; no clubbing, cyanosis, or edema  NEURO: no focal findings   SKIN: normal without suspicious lesions on exposed skin     Medications:      Current Facility-Administered Medications:     acetaminophen (TYLENOL) tablet 650 mg, 650 mg, Oral, Q4H PRN, Estela Carlson DO, 650 mg at 02/21/25 1415    ALPRAZolam (XANAX) tablet 0.5 mg, 0.5 mg, Oral, HS PRN, Estela Carlson DO, 0.5 mg at 02/19/25 2151    amiodarone tablet 200 mg, 200 mg, Oral, TID With Meals, Mello Ness MD, 200 mg at 02/23/25 0952    benzonatate (TESSALON PERLES) capsule 200 mg, 200 mg, Oral, TID, Brett Palacio MD, 200 mg at 02/23/25 0952    ceftriaxone (ROCEPHIN) 2 g/50 mL in dextrose IVPB, 2,000 mg, Intravenous, Q24H, Juanita Gonzalez MD, Last Rate: 100 mL/hr at 02/22/25 1758, 2,000 mg at 02/22/25 1758    Cholecalciferol (VITAMIN D3) tablet 1,000 Units, 1,000 Units, Oral, Daily, Estela Carlson DO, 1,000 Units at 02/23/25 0952    [Held by provider] Diclofenac Sodium (VOLTAREN) 1 % topical gel 2 g, 2 g, Topical, 4x Daily, Estela Carlson,     folic acid 1 mg, thiamine (VITAMIN B1) 100 mg in sodium chloride 0.9 % 100 mL IV piggyback, , Intravenous, Daily, Juanita Gonzalez MD, Last Rate: 200  mL/hr at 02/21/25 1538, New Bag at 02/23/25 0954    guaiFENesin (MUCINEX) 12 hr tablet 1,200 mg, 1,200 mg, Oral, BID, Estela Carlson DO, 1,200 mg at 02/23/25 0952    heparin (porcine) subcutaneous injection 5,000 Units, 5,000 Units, Subcutaneous, Q8H LITO, 5,000 Units at 02/23/25 0644 **AND** Platelet count, , , Once, Estela Carlson DO    HYDROcodone Bit-Homatrop MBr (HYCODAN) oral syrup 5 mL, 5 mL, Oral, Q4H PRN, Brett Palacio MD, 5 mL at 02/21/25 1527    levothyroxine tablet 25 mcg, 25 mcg, Oral, Early Morning, Estela Carlson DO, 25 mcg at 02/23/25 0644    lidocaine (LIDODERM) 5 % patch 1 patch, 1 patch, Topical, Daily, Estela Carlson DO, 1 patch at 02/23/25 0955    metoprolol tartrate (LOPRESSOR) tablet 25 mg, 25 mg, Oral, Q12H LITO, Mello Ness MD, 25 mg at 02/23/25 0952    metroNIDAZOLE (FLAGYL) tablet 500 mg, 500 mg, Oral, Q8H LITO, Nickolas Sargent MD, 500 mg at 02/23/25 0644    multivitamin-minerals (CENTRUM) tablet 1 tablet, 1 tablet, Oral, Daily, Estela Carlson DO, 1 tablet at 02/23/25 0952    ondansetron (ZOFRAN-ODT) dispersible tablet 4 mg, 4 mg, Oral, Q6H PRN, Mundo Johnson MD, 4 mg at 02/21/25 1738    pantoprazole (PROTONIX) EC tablet 40 mg, 40 mg, Oral, Early Morning, Estela Carlson DO, 40 mg at 02/23/25 0644    rOPINIRole (REQUIP) tablet 0.5 mg, 0.5 mg, Oral, TID, Estela Carlson DO, 0.5 mg at 02/23/25 0952     Labs & Results:        Results from last 7 days   Lab Units 02/23/25  0653 02/22/25  0353 02/21/25  0552   WBC Thousand/uL 10.15 10.16 14.30*   HEMOGLOBIN g/dL 12.6 11.8* 12.4   HEMATOCRIT % 37.0 34.8* 36.0*   PLATELETS Thousands/uL 192 148* 144*         Results from last 7 days   Lab Units 02/23/25  0653 02/22/25  0339 02/21/25  1653   POTASSIUM mmol/L 3.6 3.8 3.7   CHLORIDE mmol/L 103 102 101   CO2 mmol/L 26 22 21   BUN mg/dL 14 17 20   CREATININE mg/dL 0.89 0.84 0.74   CALCIUM mg/dL 8.0* 7.5* 7.6*   ALK PHOS U/L 64 74 89   ALT  U/L 26 25 26   AST U/L 30 31 29     Results from last 7 days   Lab Units 02/19/25  0542   INR  1.36*     Results from last 7 days   Lab Units 02/22/25  0353 02/19/25  1928   MAGNESIUM mg/dL 2.1 1.7*         EKG personally reviewed by Mello Ness MD. Atrial fibrillation with rapid ventricular response.    ECHO:    Left Ventricle: Left ventricular cavity size is normal. Wall thickness is normal. The left ventricular ejection fraction is 67% by biplane measurement. Systolic function is hyperdynamic. Wall motion cannot be accurately assessed. Diastolic function is normal.    Right Ventricle: Right ventricular cavity size is mildly dilated.    Aortic Valve: There is aortic valve sclerosis.       Counseling / Coordination of Care  Total floor / unit time spent today 25 minutes.  Greater than 50% of total time was spent with the patient and / or family counseling and / or coordination of care.

## 2025-02-23 NOTE — ASSESSMENT & PLAN NOTE
WBC   Date Value Ref Range Status   02/23/2025 10.15 4.31 - 10.16 Thousand/uL Final   02/22/2025 10.16 4.31 - 10.16 Thousand/uL Final   02/21/2025 14.30 (H) 4.31 - 10.16 Thousand/uL Final     LACTIC ACID   Date Value Ref Range Status   02/19/2025 2.2 (H) 0.5 - 2.0 mmol/L Final   02/19/2025 3.1 (H) 0.5 - 2.0 mmol/L Final     Lab Results   Component Value Date    BLOODCX Escherichia coli (A) 02/18/2025    BLOODCX Escherichia coli (A) 02/18/2025     CT A/P: Over distended gallbladder reflecting Paula cystitis.  Loculated hypodense lesion in adjacent liver not fully characterized by concerning for pericholecystic abscess  CXR negative  CT head showed possible acute right maxillary sinusitis     Assessment: Blood culture growing E. coli suspect translocation from cholecystitis.  Underwent successful cholecystostomy tube placement 2/21.  Will monitor on IV antibiotics overnight, may be stable for DC Monday, will need to wean off amiodarone drip.    Plan:  Dc w/ ventin and flagyl   Follow up with pcp  Repeating imaging at wk 2 and wk 4

## 2025-02-23 NOTE — DISCHARGE SUMMARY
Discharge Summary - Hospitalist   Name: Devin Cutler 80 y.o. male I MRN: [id number]  Unit/Bed#: S -01 I Date of Admission: 2/18/2025   Date of Service: 2/23/2025 I Hospital Day: 5     Assessment & Plan  Sepsis ([facility])  WBC   Date Value Ref Range Status   02/23/2025 10.15 4.31 - 10.16 Thousand/uL Final   02/22/2025 10.16 4.31 - 10.16 Thousand/uL Final   02/21/2025 14.30 (H) 4.31 - 10.16 Thousand/uL Final     LACTIC ACID   Date Value Ref Range Status   02/19/2025 2.2 (H) 0.5 - 2.0 mmol/L Final   02/19/2025 3.1 (H) 0.5 - 2.0 mmol/L Final     Lab Results   Component Value Date    BLOODCX Escherichia coli (A) 02/18/2025    BLOODCX Escherichia coli (A) 02/18/2025     CT A/P: Over distended gallbladder reflecting Paula cystitis.  Loculated hypodense lesion in adjacent liver not fully characterized by concerning for pericholecystic abscess  CXR negative  CT head showed possible acute right maxillary sinusitis     Assessment: Blood culture growing E. coli suspect translocation from cholecystitis.  Underwent successful cholecystostomy tube placement 2/21.  Will monitor on IV antibiotics overnight, may be stable for DC Monday, will need to wean off amiodarone drip.    Plan:  Dc w/ ventin and flagyl   Follow up with pcp  Repeating imaging at wk 2 and wk 4  URI symptoms  Reports productive cough over the past two weeks with significant worsening over the last two days with fatigue and poor PO intake with known sick contacts  CT head noted right maxillary sinus may represent acute sinusitis  CXR negative, however increased rales in RLL on exam. Suspect CXR did not show RLL consolidation due to dehydration.      Assessment: Possible viral infection followed by superimposed bacterial infection. Treating as community acquired PNA    Plan:  Follow up with pcp   A-kat ([facility]) with rvr  Assessment: Suspect triggered by active pulmonary illness and being septic.  Currently converted back to sinus rhythm on amiodarone drip. Will  cross with home metoprolol, initiation of AC will depends on if acute intervention of cholecystitis is indicated.     - amiodarone 200 mg tid x 1wk , followed by bid x1wk, then qd until seen by cardiology  - continue metoprolol  Cholecystitis  CT: Overdistended gallbladder with mild wall thickening and pericholecystic fluid, likely reflecting cholecystitis.  Lobulated hypodense lesion in the adjacent liver, not fully characterized but highly concerning for a pericholecystic abscess.     - fu as outpatient with pcp, surgery, ID  Elevated serum creatinine  Recent Labs     02/21/25  1653 02/22/25  0339 02/23/25  0653   CREATININE 0.74 0.84 0.89   EGFR 87 83 81     Estimated Creatinine Clearance: 62.9 mL/min (by C-G formula based on SCr of 0.89 mg/dL).    POA: Cr: 1.31 (baseline 0.9-1.0), GFR: 51 (baseline 80s)  Likely prerenal 2/2 dehydration/hypotension 2/2 sepsis 2/2 PNA  S/p 1L NSS IVF bolus    Assessment: As of the date of this note, creatinine level has returned near baseline.    Plan:  Monitor BMP  Avoid hypoperfusion of the kidneys, minimize nephrotoxins  Held Celebrex  Hyperbilirubinemia  Recent Labs     02/21/25  1653 02/22/25  0339 02/23/25  0653   AST 29 31 30   ALT 26 25 26   ALKPHOS 89 74 64   TBILI 0.95 0.66 0.57     UA: positive for urobilinogen  History of drinking about 9 drinks per week (3 beers three times per week while playing pole)  No abdominal pain, N/V. Abdomen nontender on exam. Suspect possibly related to sepsis. Hemoglobin normal, low suspicion due to hemolytic anemia at this time.    Suspect secondary to sepsis and cholecystitis     Plan:  F/u with pcp     Syncopal episodes (Resolved: 2/23/2025)  Presented to the ED after syncopal episode x2 while playing pool. Hit head on fall.  Trauma work up negative, including negative CT head  Meet Sepsis criteria with tachypnea, hypotension, leukocytosis, and lactic acidosis with suspected PNA etiology. Initial BP 72/40, responded well to  IVF.  Reports productive cough over the past two weeks with significant worsening over the last two days with fatigue and poor PO intake with known sick contacts.  EKG NSR, rate 66  Suspect syncopal episodes 2/2 sepsis 2/2 PNA    Plan:  Treat problem above  Essential hypertension  Initial Blood pressure on admission 72/40. Suspect dehydration/hypotension 2/2 sepsis 2/2 PNA.  S/p 1L NSS IVF bolus  Home regimen: Metoprolol Tartrate 25mg BID    Plan:  F/u with pcp    Anxiety  History of anxiety  Continue Xanax 0.5mg qhs prn  GERD (gastroesophageal reflux disease)  Home regimen: lansoprazole 30mg  Continue Pantoprazole 40mg PO while inpatient  RLS (restless legs syndrome)  Continue Ropinirole 0.5mg TID prn  Hypothyroidism due to acquired atrophy of thyroid  Continue Synthroid 25mg    Right hip pain  History of Right hip pain  Hold Celebrex and Voltaren gel due to RANDAL  Continue lidocaine patch  Continue Tyelnol prn for pain     Medical Problems       Resolved Problems  Date Reviewed: 10/25/2024   None       Discharging Physician / Practitioner: Juanita Gonzalez MD  PCP: Momo Trinidad MD  Admission Date:   Admission Orders (From admission, onward)       Ordered        02/18/25 2339  INPATIENT ADMISSION  Once                          Discharge Date: 02/23/25    Consultations During Hospital Stay:  General surgery  Infectious disease    Procedures Performed:   Percutaneous cholecystostomy tube    Significant Findings / Test Results:   Blood culture positive for E. Coli  3.2 x 2.7 x 2.3 intrahepatic abscess adjacent to gallbladder      Incidental Findings:   None      Test Results Pending at Discharge (will require follow up):   None     Outpatient Tests Requested:  Repeat CT imaging of the abdomen pelvis in 4 weeks  Follow-up with surgery in 6 weeks  Follow-up with IR cholecystectomy tube      Complications:  afib with rvr    Reason for Admission: septic shock    Hospital Course:   Devin Cutler is a 80 y.o. male patient who  originally presented to the hospital on 2/18/2025 due to septic shock     Hospital Course:  During initial evaluation, patient was noted to be hypotensive with SBP around 72, leukocytosis around 18, lactic acidosis 2.5, Pro-Gaetano was elevated at 4.64, was initially given 30 cc per kg fluid, however in the and only were able to given 1.6 L in total due to exam findings of rales, thus patient was placed on maintenance fluid with 75 cc an hour.  BNP was elevated at 463, therefore echo was ordered showing EF of 67%.  Patient was then started on ceftriaxone for pneumonia, and blood culture returned positive for gram-negative bacteremia, which ceftriaxone dosage was appropriately titrated.  Ultrasound of right upper quadrant raises concern for cholecystitis, subsequently CT scan of the abdomen was obtained showing cholecystitis and loculated hypodense lesion near liver concerning for Marlo cholecystic abscess.  Surgery and interventional radiologist was consulted.  Patient underwent percutaneous tameka cystectomy tube placement.  However patient's hospitalization was complicated by A-fib with RVR, initially was placed on amiodarone drip returned to sinus, however required to be placed on amnio drip again when transitioned to metoprolol.  Cardiology was consulted for the management of arrhythmia and outpatient follow-up.  Patient remained on amiodarone, and yielded good heart rate control.  Upon discharge, patient will be on prolonged course of antibiotics with Vantin and Flagyl with repeating imaging demonstrating clearance of infection, follow-up with surgery and interventional radiologist for the tube placement and follow-up with infectious disease.       Please see above list of diagnoses and related plan for additional information.     Condition at Discharge: fair    Discharge Day Visit / Exam:   Subjective: No acute events overnight, presents with no acute complaints  Vitals: Blood Pressure: 100/62 (02/23/25  "0734)  Pulse: 71 (02/23/25 0734)  Temperature: 98.9 °F (37.2 °C) (02/23/25 1100)  Temp Source: Oral (02/23/25 1100)  Respirations: 20 (02/23/25 1100)  Height: 5' 7\" (170.2 cm) (02/19/25 1500)  Weight - Scale: 75.4 kg (166 lb 3.6 oz) (02/23/25 0600)  SpO2: 94 % (02/23/25 0734)  Physical Exam  Vitals and nursing note reviewed.   Constitutional:       General: He is not in acute distress.     Appearance: He is well-developed.   HENT:      Head: Normocephalic and atraumatic.   Eyes:      Conjunctiva/sclera: Conjunctivae normal.   Cardiovascular:      Rate and Rhythm: Normal rate and regular rhythm.      Heart sounds: No murmur heard.  Pulmonary:      Effort: Pulmonary effort is normal. No respiratory distress.      Breath sounds: Normal breath sounds.   Abdominal:      Palpations: Abdomen is soft.      Tenderness: There is no abdominal tenderness.      Comments: Tube dressing dry and clean, draining yellow fluids   Musculoskeletal:         General: No swelling.      Cervical back: Neck supple.   Skin:     General: Skin is warm and dry.      Capillary Refill: Capillary refill takes less than 2 seconds.   Neurological:      Mental Status: He is alert.   Psychiatric:         Mood and Affect: Mood normal.          Discussion with Family: Updated  (daughter) at bedside.    Discharge instructions/Information to patient and family:   See after visit summary for information provided to patient and family.      Provisions for Follow-Up Care:  See after visit summary for information related to follow-up care and any pertinent home health orders.      Mobility at time of Discharge:   Basic Mobility Inpatient Raw Score: 19  JH-HLM Goal: 6: Walk 10 steps or more  JH-HLM Achieved: 5: Stand (1 or more minutes)  HLM Goal NOT achieved. Continue to encourage mobility in post discharge setting.     Disposition:   Home with VNA Services (Reminder: Complete face to face encounter)    Planned Readmission: none    Discharge " Medications:  See after visit summary for reconciled discharge medications provided to patient and/or family.      Administrative Statements   Discharge Statement:  I have spent a total time of 30 minutes in caring for this patient on the day of the visit/encounter. .    **Please Note: This note may have been constructed using a voice recognition system**

## 2025-02-23 NOTE — ASSESSMENT & PLAN NOTE
Recent Labs     02/21/25  1653 02/22/25  0339 02/23/25  0653   CREATININE 0.74 0.84 0.89   EGFR 87 83 81     Estimated Creatinine Clearance: 62.9 mL/min (by C-G formula based on SCr of 0.89 mg/dL).    POA: Cr: 1.31 (baseline 0.9-1.0), GFR: 51 (baseline 80s)  Likely prerenal 2/2 dehydration/hypotension 2/2 sepsis 2/2 PNA  S/p 1L NSS IVF bolus    Assessment: As of the date of this note, creatinine level has returned near baseline.    Plan:  Monitor BMP  Avoid hypoperfusion of the kidneys, minimize nephrotoxins  Held Celebrex

## 2025-02-23 NOTE — DISCHARGE INSTR - AVS FIRST PAGE
Dear Devin Cutler,     It was our pleasure to care for you here at [facility].  It is our hope that we were always able to exceed the expected standards for your care during your stay.  You were hospitalized due to upper respiratory symptoms, inflammation of gall bladder and bacteria seeding in your blood stream.  For follow up as well as any medication refills, we recommend that you follow up with your primary care physician. However, at this time we provide for you here, the most important instructions / recommendations at discharge:       Notable Medication Adjustments -   Please start taking amiodarone 200 mg tablet every 8 hours for 1 week, starting tomorrow February 24 to March 1, then you will take this medication every 12 hours (2 times a day) for 1 week starting March 2 to March 8, on the March 9, you will take this medication daily, and continuously to take this daily until you see cardiologist, this medication is for controlling your heart rate and maintaining regular rhythm  Start taking apixaban (Eliquis) 5 mg 2 times a day, take this medication continuously and follow-up with your cardiologist.  This medication is help you to prevent blood clots and risk of stroke while you have irregular heart rhythm  Please start taking cefpodoxime (Vantin) 200 mg 2 times a day with meals, this is one of the 2 medication you need to take for clearing your active infection, continue this medication indefinitely until seen by infectious disease  Please start taking metronidazole (Flagyl) 500 mg tablet 2 times a day, this is the second of the 2 medication that you need to take for clearing your active infection,continue this medication indefinitely until seen by infectious disease  We have made no other changes to your medication list. Please continue taking your medications as you were before this admission.      Testing Required after Discharge -   Please repeat the labs: CBC and  Comprehensive Metabolic Panel upon request with your primary care physician.  You will need weekly labs including BMP and CBC, please request this with your primary care doctor, this labs are used to monitor your infection clearance  Please repeat CT abdomen with drain in 2 weeks, this imaging is for follow-up with surgery  Please repeat CT abdomen in 4 weeks, infectious disease want to follow-up with your infection    Important follow up information -   Please follow up with your primary care physician within 1 week(s), to discuss about your recent hospitalization, and any changes in your medications or treatment plans. Ensure you understand your discharge instructions and call your doctor if you experience any worsening symptoms or have questions about your care.    Please follow-up with cardiology in 1 month  Please follow-up with general surgery in 1 month  Please follow-up with infectious disease in 1 month    Other Instructions   It is important that you follow-up with your doctors in the upcoming weeks  If you do not hear from our scheduling office or need additional assistance, please call our scheduling [phone]    If you feel like your illness/symptoms are worsening and needs urgent care  wait until your follow-up visit or your primary care physician cannot see you  - You can get same-day medical attention at urgent care  - If you need a more serious immediate care, please return to the ER       Please review this entire after visit summary as additional general instructions including medication list, appointments, activity, diet, any pertinent wound care, and other additional recommendations from your care team that may be provided for you.  I am honored to be a part of our care journey. I wish you a speedy recovery and the very best of luck in your health endeavors.       Sincerely,     Juanita Gonzalez MD

## 2025-02-23 NOTE — ASSESSMENT & PLAN NOTE
2 out of 2 blood cultures isolated E. coli.  Susceptibility pattern noted.  Suspect that this is related to his cholecystitis and likely liver abscess development.  Patient is tolerating current antibiotics.  EKG without significantly prolonged QTc.  We discussed transition to oral antibiotic until radiographic resolution of his abscess on imaging.  We discussed weekly lab monitoring, follow-up after imaging completed, avoidance of alcohol and timeline in terms of repeat imaging.  Patient is also plan for follow-up with surgery for eventual cholecystectomy.  Antibiotics adjusted as below  Continue to trend fever curve/vitals while admitted  Monitor CBC/chemistry while admitted  Outpatient lab monitoring and imaging as below  Chronic follow-up with surgery  Additional supportive care/discharge per primary

## 2025-02-23 NOTE — ASSESSMENT & PLAN NOTE
This occurred after admission in the setting of his sepsis and acute cholecystitis.  He has gone in and out of atrial fibrillation, but with IV amiodarone and metoprolol he converted back to sinus rhythm.  He has remained in sinus rhythm since.    Continue oral amiodarone into the outpatient setting.  Recommend 200 mg 3 times a day for 1 week, 200 mg twice daily for 1 week, then daily thereafter.  Continue his outpatient metoprolol of 25 mg twice daily.  We will arrange for outpatient follow-up.  From a cardiac standpoint he can be discharged.

## 2025-02-24 ENCOUNTER — TRANSITIONAL CARE MANAGEMENT (OUTPATIENT)
Dept: FAMILY MEDICINE CLINIC | Facility: CLINIC | Age: 80
End: 2025-02-24

## 2025-02-24 ENCOUNTER — TELEPHONE (OUTPATIENT)
Dept: INFECTIOUS DISEASES | Facility: CLINIC | Age: 80
End: 2025-02-24

## 2025-02-24 DIAGNOSIS — R78.81 E COLI BACTEREMIA: ICD-10-CM

## 2025-02-24 DIAGNOSIS — B96.20 E COLI BACTEREMIA: ICD-10-CM

## 2025-02-24 DIAGNOSIS — K81.9 CHOLECYSTITIS: ICD-10-CM

## 2025-02-24 DIAGNOSIS — K75.0 LIVER ABSCESS: Primary | ICD-10-CM

## 2025-02-24 RX ORDER — CEFPODOXIME PROXETIL 200 MG/1
400 TABLET, FILM COATED ORAL 2 TIMES DAILY
Qty: 56 TABLET | Refills: 0 | Status: SHIPPED | OUTPATIENT
Start: 2025-03-25 | End: 2025-04-08

## 2025-02-24 RX ORDER — METRONIDAZOLE 500 MG/1
500 TABLET ORAL EVERY 12 HOURS SCHEDULED
Qty: 28 TABLET | Refills: 0 | Status: SHIPPED | OUTPATIENT
Start: 2025-03-25 | End: 2025-03-06

## 2025-02-24 NOTE — UTILIZATION REVIEW
NOTIFICATION OF ADMISSION DISCHARGE   This is a Notification of Discharge from [facility]. Please be advised that this patient has been discharge from our facility. Below you will find the admission and discharge date and time including the patient’s disposition.   UTILIZATION REVIEW CONTACT:  Nichelle Morataya  Utilization   Network Utilization Review Department  Phone: [phone] x carefully listen to the prompts. All voicemails are confidential.  Email: [email or web address]     ADMISSION INFORMATION  PRESENTATION DATE: 2/18/2025  8:54 PM  OBERVATION ADMISSION DATE: N/A  INPATIENT ADMISSION DATE: 2/18/25 11:39 PM   DISCHARGE DATE: 2/23/2025  6:37 PM   DISPOSITION:Home with Home Health Care    Network Utilization Review Department  ATTENTION: Please call with any questions or concerns to [phone] and carefully listen to the prompts so that you are directed to the right person. All voicemails are confidential.   For Discharge needs, contact Care Management DC Support Team at [phone] opt. 2  Send all requests for admission clinical reviews, approved or denied determinations and any other requests to dedicated fax number below belonging to the campus where the patient is receiving treatment. List of dedicated fax numbers for the Facilities:  FACILITY NAME UR FAX NUMBER   ADMISSION DENIALS (Administrative/Medical Necessity) [phone]   DISCHARGE SUPPORT TEAM ([facility]) [phone]   PARENT CHILD HEALTH (Maternity/NICU/Pediatrics) [phone]   [facility] [phone]   [facility] [phone]   [facility], [facility] [phone]   [facility] [phone]   [facility] [phone]   [facility] [phone]   [facility] [phone]   [facility]  [phone]   [facility] [phone]   [facility] [phone]   [facility] [phone]   [facility] [phone]

## 2025-02-24 NOTE — PROGRESS NOTES
OPAT NOTE    AP ONLY CAMPUSES ARE: [place], [place], and [place].   In these cases, physician is only cosigning notes.    Supervising/Discharge provider: Juli  Cosigning Physician:    Diagnosis: Liver abscess / Ecoli bacteremia / cholecystitis     Drug:   Vantin 400mg PO BID  Flagyl 500mg PO BID     Labs/Frequency: CBCD CMP weekly     Imaging: CT A/P w/IV contrast Due 3/23    End Date: CT resolution     Infusion/VNA/SNF contact: SABRINA     Next appointment: 4/3

## 2025-02-24 NOTE — TELEPHONE ENCOUNTER
Called and spoke with patients daughter Talita today.   Went over antibiotic plan, weekly labs and scheduled patient for follow up appointment. Informed Talita patient has CT scan ordered that needs to be scheduled for 3/23/25. Talita states she will call to schedule CT scan and central scheduling number provided at this time. Informed Talita patient will continue PO antibiotics until follow up appointment. Confirmed pharmacy at this time.   Informed Talita if there are any further questions or concerns to call the office   Talita verbalizes understanding at this time.

## 2025-02-25 ENCOUNTER — HOME CARE VISIT (OUTPATIENT)
Dept: HOME HEALTH SERVICES | Facility: HOME HEALTHCARE | Age: 80
End: 2025-02-25
Payer: COMMERCIAL

## 2025-02-25 VITALS
TEMPERATURE: 97.1 F | HEART RATE: 68 BPM | OXYGEN SATURATION: 98 % | RESPIRATION RATE: 20 BRPM | DIASTOLIC BLOOD PRESSURE: 70 MMHG | SYSTOLIC BLOOD PRESSURE: 110 MMHG

## 2025-02-25 DIAGNOSIS — I48.91 A-FIB (HCC): ICD-10-CM

## 2025-02-25 PROCEDURE — 400013 VN SOC

## 2025-02-25 PROCEDURE — G0299 HHS/HOSPICE OF RN EA 15 MIN: HCPCS

## 2025-02-25 NOTE — TELEPHONE ENCOUNTER
Patient was released from the hospital with Eliquis but will not have enough medication to last until he is seen by cardio. He is asking for at least enough medication to get him to be seen    Reason for call:   [x] Refill   [] Prior Auth  [] Other:     Office:   [] PCP/Provider -   [x] Specialty/Provider - Cardio/[facility]    Medication: Eliquis 5mg    Dose/Frequency: 1 tab bid    Quantity: 60    Pharmacy: [id number] - [address] [phone]     Does the patient have enough for 3 days?   [x] Yes   [] No - Send as HP to POD

## 2025-02-26 ENCOUNTER — HOME CARE VISIT (OUTPATIENT)
Dept: HOME HEALTH SERVICES | Facility: HOME HEALTHCARE | Age: 80
End: 2025-02-26
Payer: COMMERCIAL

## 2025-02-26 PROCEDURE — 10330064 SPONGE, EXCILON SPLIT DRN STR 4X4" (2/P"

## 2025-02-26 PROCEDURE — 10330064 TAPE, ADHSV TRANSPORE WHT 1 (12RL/BX 10"

## 2025-02-27 ENCOUNTER — APPOINTMENT (OUTPATIENT)
Dept: LAB | Facility: MEDICAL CENTER | Age: 80
End: 2025-02-27
Payer: COMMERCIAL

## 2025-02-28 ENCOUNTER — RESULTS FOLLOW-UP (OUTPATIENT)
Dept: OTHER | Facility: HOSPITAL | Age: 80
End: 2025-02-28

## 2025-03-04 ENCOUNTER — HOME CARE VISIT (OUTPATIENT)
Dept: HOME HEALTH SERVICES | Facility: HOME HEALTHCARE | Age: 80
End: 2025-03-04
Payer: COMMERCIAL

## 2025-03-04 ENCOUNTER — APPOINTMENT (OUTPATIENT)
Dept: LAB | Facility: MEDICAL CENTER | Age: 80
End: 2025-03-04
Payer: COMMERCIAL

## 2025-03-04 VITALS
DIASTOLIC BLOOD PRESSURE: 79 MMHG | OXYGEN SATURATION: 99 % | SYSTOLIC BLOOD PRESSURE: 126 MMHG | TEMPERATURE: 97 F | HEART RATE: 60 BPM | RESPIRATION RATE: 16 BRPM

## 2025-03-04 DIAGNOSIS — B96.20 E COLI BACTEREMIA: ICD-10-CM

## 2025-03-04 DIAGNOSIS — R78.81 E COLI BACTEREMIA: ICD-10-CM

## 2025-03-04 DIAGNOSIS — K81.9 CHOLECYSTITIS: ICD-10-CM

## 2025-03-04 DIAGNOSIS — K75.0 LIVER ABSCESS: Primary | ICD-10-CM

## 2025-03-04 LAB
ALBUMIN SERPL BCG-MCNC: 3.7 G/DL (ref 3.5–5)
ALP SERPL-CCNC: 71 U/L (ref 34–104)
ALT SERPL W P-5'-P-CCNC: 17 U/L (ref 7–52)
ANION GAP SERPL CALCULATED.3IONS-SCNC: 9 MMOL/L (ref 4–13)
AST SERPL W P-5'-P-CCNC: 22 U/L (ref 13–39)
BILIRUB SERPL-MCNC: 0.6 MG/DL (ref 0.2–1)
BUN SERPL-MCNC: 14 MG/DL (ref 5–25)
CALCIUM SERPL-MCNC: 9.4 MG/DL (ref 8.4–10.2)
CHLORIDE SERPL-SCNC: 101 MMOL/L (ref 96–108)
CO2 SERPL-SCNC: 27 MMOL/L (ref 21–32)
CREAT SERPL-MCNC: 0.91 MG/DL (ref 0.6–1.3)
GFR SERPL CREATININE-BSD FRML MDRD: 79 ML/MIN/1.73SQ M
GLUCOSE SERPL-MCNC: 75 MG/DL (ref 65–140)
POTASSIUM SERPL-SCNC: 4.4 MMOL/L (ref 3.5–5.3)
PROT SERPL-MCNC: 7.2 G/DL (ref 6.4–8.4)
SODIUM SERPL-SCNC: 137 MMOL/L (ref 135–147)

## 2025-03-04 PROCEDURE — 80053 COMPREHEN METABOLIC PANEL: CPT

## 2025-03-04 PROCEDURE — G0299 HHS/HOSPICE OF RN EA 15 MIN: HCPCS

## 2025-03-04 PROCEDURE — G0180 MD CERTIFICATION HHA PATIENT: HCPCS | Performed by: HOSPITALIST

## 2025-03-04 PROCEDURE — 36415 COLL VENOUS BLD VENIPUNCTURE: CPT

## 2025-03-05 ENCOUNTER — HOME CARE VISIT (OUTPATIENT)
Dept: HOME HEALTH SERVICES | Facility: HOME HEALTHCARE | Age: 80
End: 2025-03-05
Payer: COMMERCIAL

## 2025-03-06 ENCOUNTER — OFFICE VISIT (OUTPATIENT)
Dept: FAMILY MEDICINE CLINIC | Facility: CLINIC | Age: 80
End: 2025-03-06
Payer: COMMERCIAL

## 2025-03-06 VITALS
SYSTOLIC BLOOD PRESSURE: 114 MMHG | HEIGHT: 67 IN | WEIGHT: 163 LBS | HEART RATE: 64 BPM | DIASTOLIC BLOOD PRESSURE: 66 MMHG | BODY MASS INDEX: 25.58 KG/M2 | OXYGEN SATURATION: 97 % | TEMPERATURE: 97.5 F

## 2025-03-06 DIAGNOSIS — R78.81 BACTEREMIA: ICD-10-CM

## 2025-03-06 DIAGNOSIS — M25.551 RIGHT HIP PAIN: ICD-10-CM

## 2025-03-06 DIAGNOSIS — K75.0 LIVER ABSCESS: ICD-10-CM

## 2025-03-06 DIAGNOSIS — I10 ESSENTIAL HYPERTENSION: ICD-10-CM

## 2025-03-06 DIAGNOSIS — K21.9 GASTROESOPHAGEAL REFLUX DISEASE WITHOUT ESOPHAGITIS: ICD-10-CM

## 2025-03-06 DIAGNOSIS — B96.20 E COLI BACTEREMIA: ICD-10-CM

## 2025-03-06 DIAGNOSIS — R78.81 E COLI BACTEREMIA: ICD-10-CM

## 2025-03-06 PROCEDURE — 99495 TRANSJ CARE MGMT MOD F2F 14D: CPT | Performed by: FAMILY MEDICINE

## 2025-03-06 RX ORDER — CEFPODOXIME PROXETIL 200 MG/1
400 TABLET, FILM COATED ORAL 2 TIMES DAILY WITH MEALS
Qty: 120 TABLET | Refills: 0 | Status: SHIPPED | OUTPATIENT
Start: 2025-03-06 | End: 2025-04-05

## 2025-03-06 RX ORDER — LANSOPRAZOLE 30 MG/1
30 CAPSULE, DELAYED RELEASE ORAL DAILY
Start: 2025-03-06

## 2025-03-06 RX ORDER — METOPROLOL TARTRATE 25 MG/1
25 TABLET, FILM COATED ORAL 2 TIMES DAILY
Start: 2025-03-06

## 2025-03-06 RX ORDER — CELECOXIB 200 MG/1
200 CAPSULE ORAL DAILY
Start: 2025-03-06

## 2025-03-06 NOTE — ASSESSMENT & PLAN NOTE
Orders:  •  lansoprazole (PREVACID) 30 mg capsule; Take 1 capsule (30 mg total) by mouth daily 1 capsule by mouth daily

## 2025-03-06 NOTE — PROGRESS NOTES
Name: Devin Cutler      : 1945      MRN: [id number]  Encounter Provider: oMmo Trinidad MD  Encounter Date: 3/6/2025   Encounter department: [facility]  :  Assessment & Plan  Liver abscess         E coli bacteremia         Bacteremia    Orders:  •  cefpodoxime (VANTIN) 200 mg tablet; Take 2 tablets (400 mg total) by mouth 2 (two) times a day with meals    Right hip pain    Orders:  •  celecoxib (CeleBREX) 200 mg capsule; Take 1 capsule (200 mg total) by mouth daily    Gastroesophageal reflux disease without esophagitis    Orders:  •  lansoprazole (PREVACID) 30 mg capsule; Take 1 capsule (30 mg total) by mouth daily 1 capsule by mouth daily    Essential hypertension    Orders:  •  metoprolol tartrate (LOPRESSOR) 25 mg tablet; Take 1 tablet (25 mg total) by mouth 2 (two) times a day      TCM Call     Date and time call was made  2025  1:47 PM    Hospital care reviewed  Records reviewed    Patient was hospitialized at  [facility]    Date of Admission  25    Date of discharge  25    Diagnosis  Sepsis (HCC)    Disposition  Home    Were the patients medications reviewed and updated  No    Current Symptoms  None      TCM Call     Should patient be enrolled in anticoag monitoring?  No    Scheduled for follow up?  Yes    Did you obtain your prescribed medications  Yes    Do you need help managing your prescriptions or medications  No    Is transportation to your appointment needed  No    I have advised the patient to call PCP with any new or worsening symptoms  Rabia Young MA    Living Arrangements  Spouse or Significiant other    Support System  Family    The type of support provided  Emotional; Financial; Physical    Do you have social support  Yes, as much as I need    Are you recieving any outpatient services  No    Are you recieving home care services  Yes    Types of home care services  Nurse visit    Are you using any community resources  No     Current waiver services  No    Have you fallen in the last 12 months  Yes    How many times  1    Interperter language line needed  No            History of Present Illness   80-year-old male here today for checkup for TCM visit from recent hospital stay which was quite eventful.  Patient with pneumonia as well as sepsis and developing what sounds like ascending cholangitis with questionable liver abscess.  Patient here With complaints today in the office and is actually doing well we reviewed all of his medication I refilled his Vantin there was a problem with the refills and I have given him another month supply to last him.  Patient also has been having some issues with his T-tube however that is seems to be improving and he is rodriguez be following up with surgery and infectious disease by next time I see him.  Patient's medicines have reviewed today and have been clarified for him.      Review of Systems   Constitutional:  Negative for activity change, appetite change, chills, fatigue, fever and unexpected weight change.   HENT:  Negative for congestion, ear pain, hearing loss, mouth sores, postnasal drip, sinus pressure, sinus pain, sneezing and sore throat.    Respiratory:  Negative for apnea, cough, shortness of breath and wheezing.    Cardiovascular:  Negative for chest pain, palpitations and leg swelling.   Gastrointestinal:  Negative for abdominal pain, constipation, diarrhea, nausea and vomiting.   Endocrine: Negative for cold intolerance and heat intolerance.   Genitourinary:  Negative for dysuria, frequency and hematuria.   Musculoskeletal:  Negative for arthralgias, back pain, gait problem, joint swelling and neck pain.   Skin:  Negative for rash.   Neurological:  Negative for dizziness, weakness and numbness.   Hematological:  Does not bruise/bleed easily.   Psychiatric/Behavioral:  Negative for agitation, behavioral problems, confusion, hallucinations and sleep disturbance. The patient is not  "nervous/anxious.        Objective   /66 (BP Location: Left arm, Patient Position: Sitting, Cuff Size: Standard)   Pulse 64   Temp 97.5 °F (36.4 °C) (Skin)   Ht 5' 7\" (1.702 m)   Wt 73.9 kg (163 lb)   SpO2 97%   BMI 25.53 kg/m²      Physical Exam  Constitutional:       Appearance: He is well-developed.   HENT:      Head: Normocephalic and atraumatic.      Right Ear: External ear normal.      Left Ear: External ear normal.      Nose: Nose normal.      Mouth/Throat:      Pharynx: Oropharynx is clear. No oropharyngeal exudate.   Eyes:      General: No scleral icterus.     Conjunctiva/sclera: Conjunctivae normal.      Pupils: Pupils are equal, round, and reactive to light.   Cardiovascular:      Rate and Rhythm: Normal rate and regular rhythm.      Heart sounds: Normal heart sounds.   Pulmonary:      Effort: Pulmonary effort is normal.      Breath sounds: Normal breath sounds. No wheezing or rales.   Abdominal:      General: Bowel sounds are normal.      Palpations: Abdomen is soft.      Tenderness: There is no abdominal tenderness. There is no guarding.      Comments: T tube in place RUQ   Musculoskeletal:         General: Normal range of motion.      Cervical back: Normal range of motion and neck supple.   Skin:     General: Skin is warm and dry.      Findings: No erythema or rash.   Neurological:      Mental Status: He is alert and oriented to person, place, and time. Mental status is at baseline.   Psychiatric:         Mood and Affect: Mood normal.         Behavior: Behavior normal.         Thought Content: Thought content normal.         Judgment: Judgment normal.         "

## 2025-03-06 NOTE — ASSESSMENT & PLAN NOTE
Orders:  •  metoprolol tartrate (LOPRESSOR) 25 mg tablet; Take 1 tablet (25 mg total) by mouth 2 (two) times a day

## 2025-03-07 ENCOUNTER — HOME CARE VISIT (OUTPATIENT)
Dept: HOME HEALTH SERVICES | Facility: HOME HEALTHCARE | Age: 80
End: 2025-03-07
Payer: COMMERCIAL

## 2025-03-11 ENCOUNTER — APPOINTMENT (OUTPATIENT)
Dept: LAB | Facility: MEDICAL CENTER | Age: 80
End: 2025-03-11
Payer: COMMERCIAL

## 2025-03-11 ENCOUNTER — HOME CARE VISIT (OUTPATIENT)
Dept: HOME HEALTH SERVICES | Facility: HOME HEALTHCARE | Age: 80
End: 2025-03-11
Payer: COMMERCIAL

## 2025-03-11 VITALS
RESPIRATION RATE: 18 BRPM | DIASTOLIC BLOOD PRESSURE: 79 MMHG | TEMPERATURE: 97.2 F | OXYGEN SATURATION: 100 % | SYSTOLIC BLOOD PRESSURE: 120 MMHG | HEART RATE: 77 BPM

## 2025-03-11 DIAGNOSIS — B96.20 E COLI BACTEREMIA: ICD-10-CM

## 2025-03-11 DIAGNOSIS — R78.81 E COLI BACTEREMIA: ICD-10-CM

## 2025-03-11 DIAGNOSIS — K75.0 LIVER ABSCESS: ICD-10-CM

## 2025-03-11 DIAGNOSIS — K81.9 CHOLECYSTITIS: ICD-10-CM

## 2025-03-11 LAB
ALBUMIN SERPL BCG-MCNC: 3.7 G/DL (ref 3.5–5)
ALP SERPL-CCNC: 71 U/L (ref 34–104)
ALT SERPL W P-5'-P-CCNC: 14 U/L (ref 7–52)
ANION GAP SERPL CALCULATED.3IONS-SCNC: 8 MMOL/L (ref 4–13)
AST SERPL W P-5'-P-CCNC: 23 U/L (ref 13–39)
BILIRUB SERPL-MCNC: 0.58 MG/DL (ref 0.2–1)
BUN SERPL-MCNC: 15 MG/DL (ref 5–25)
CALCIUM SERPL-MCNC: 9.2 MG/DL (ref 8.4–10.2)
CHLORIDE SERPL-SCNC: 108 MMOL/L (ref 96–108)
CO2 SERPL-SCNC: 25 MMOL/L (ref 21–32)
CREAT SERPL-MCNC: 0.92 MG/DL (ref 0.6–1.3)
GFR SERPL CREATININE-BSD FRML MDRD: 78 ML/MIN/1.73SQ M
GLUCOSE SERPL-MCNC: 116 MG/DL (ref 65–140)
POTASSIUM SERPL-SCNC: 4.4 MMOL/L (ref 3.5–5.3)
PROT SERPL-MCNC: 6.6 G/DL (ref 6.4–8.4)
SODIUM SERPL-SCNC: 141 MMOL/L (ref 135–147)

## 2025-03-11 PROCEDURE — 36415 COLL VENOUS BLD VENIPUNCTURE: CPT

## 2025-03-11 PROCEDURE — 85025 COMPLETE CBC W/AUTO DIFF WBC: CPT

## 2025-03-11 PROCEDURE — 80053 COMPREHEN METABOLIC PANEL: CPT

## 2025-03-11 PROCEDURE — G0299 HHS/HOSPICE OF RN EA 15 MIN: HCPCS

## 2025-03-12 LAB
BASOPHILS # BLD AUTO: 0.04 THOUSANDS/ÂΜL (ref 0–0.1)
BASOPHILS NFR BLD AUTO: 1 % (ref 0–1)
EOSINOPHIL # BLD AUTO: 0.08 THOUSAND/ÂΜL (ref 0–0.61)
EOSINOPHIL NFR BLD AUTO: 2 % (ref 0–6)
ERYTHROCYTE [DISTWIDTH] IN BLOOD BY AUTOMATED COUNT: 14.2 % (ref 11.6–15.1)
HCT VFR BLD AUTO: 42.4 % (ref 36.5–49.3)
HGB BLD-MCNC: 13.4 G/DL (ref 12–17)
IMM GRANULOCYTES # BLD AUTO: 0.01 THOUSAND/UL (ref 0–0.2)
IMM GRANULOCYTES NFR BLD AUTO: 0 % (ref 0–2)
LYMPHOCYTES # BLD AUTO: 0.84 THOUSANDS/ÂΜL (ref 0.6–4.47)
LYMPHOCYTES NFR BLD AUTO: 16 % (ref 14–44)
MCH RBC QN AUTO: 29.9 PG (ref 26.8–34.3)
MCHC RBC AUTO-ENTMCNC: 31.6 G/DL (ref 31.4–37.4)
MCV RBC AUTO: 95 FL (ref 82–98)
MONOCYTES # BLD AUTO: 0.34 THOUSAND/ÂΜL (ref 0.17–1.22)
MONOCYTES NFR BLD AUTO: 6 % (ref 4–12)
NEUTROPHILS # BLD AUTO: 4.1 THOUSANDS/ÂΜL (ref 1.85–7.62)
NEUTS SEG NFR BLD AUTO: 75 % (ref 43–75)
NRBC BLD AUTO-RTO: 0 /100 WBCS
PLATELET # BLD AUTO: 181 THOUSANDS/UL (ref 149–390)
PMV BLD AUTO: 11.2 FL (ref 8.9–12.7)
RBC # BLD AUTO: 4.48 MILLION/UL (ref 3.88–5.62)
WBC # BLD AUTO: 5.41 THOUSAND/UL (ref 4.31–10.16)

## 2025-03-17 ENCOUNTER — HOME CARE VISIT (OUTPATIENT)
Dept: HOME HEALTH SERVICES | Facility: HOME HEALTHCARE | Age: 80
End: 2025-03-17
Payer: COMMERCIAL

## 2025-03-17 ENCOUNTER — TELEPHONE (OUTPATIENT)
Dept: NEUROLOGY | Facility: CLINIC | Age: 80
End: 2025-03-17

## 2025-03-18 ENCOUNTER — APPOINTMENT (OUTPATIENT)
Dept: LAB | Facility: MEDICAL CENTER | Age: 80
End: 2025-03-18
Payer: COMMERCIAL

## 2025-03-18 ENCOUNTER — HOME CARE VISIT (OUTPATIENT)
Dept: HOME HEALTH SERVICES | Facility: HOME HEALTHCARE | Age: 80
End: 2025-03-18
Payer: COMMERCIAL

## 2025-03-18 DIAGNOSIS — K81.9 CHOLECYSTITIS: ICD-10-CM

## 2025-03-18 DIAGNOSIS — B96.20 E COLI BACTEREMIA: ICD-10-CM

## 2025-03-18 DIAGNOSIS — K75.0 LIVER ABSCESS: ICD-10-CM

## 2025-03-18 DIAGNOSIS — R78.81 E COLI BACTEREMIA: ICD-10-CM

## 2025-03-18 LAB
ALBUMIN SERPL BCG-MCNC: 3.8 G/DL (ref 3.5–5)
ALP SERPL-CCNC: 78 U/L (ref 34–104)
ALT SERPL W P-5'-P-CCNC: 15 U/L (ref 7–52)
ANION GAP SERPL CALCULATED.3IONS-SCNC: 9 MMOL/L (ref 4–13)
AST SERPL W P-5'-P-CCNC: 18 U/L (ref 13–39)
BASOPHILS # BLD AUTO: 0.02 THOUSANDS/ÂΜL (ref 0–0.1)
BASOPHILS NFR BLD AUTO: 0 % (ref 0–1)
BILIRUB SERPL-MCNC: 0.82 MG/DL (ref 0.2–1)
BUN SERPL-MCNC: 14 MG/DL (ref 5–25)
CALCIUM SERPL-MCNC: 9.1 MG/DL (ref 8.4–10.2)
CHLORIDE SERPL-SCNC: 103 MMOL/L (ref 96–108)
CO2 SERPL-SCNC: 27 MMOL/L (ref 21–32)
CREAT SERPL-MCNC: 0.85 MG/DL (ref 0.6–1.3)
EOSINOPHIL # BLD AUTO: 0.06 THOUSAND/ÂΜL (ref 0–0.61)
EOSINOPHIL NFR BLD AUTO: 1 % (ref 0–6)
ERYTHROCYTE [DISTWIDTH] IN BLOOD BY AUTOMATED COUNT: 14.5 % (ref 11.6–15.1)
GFR SERPL CREATININE-BSD FRML MDRD: 82 ML/MIN/1.73SQ M
GLUCOSE P FAST SERPL-MCNC: 101 MG/DL (ref 65–99)
HCT VFR BLD AUTO: 43.9 % (ref 36.5–49.3)
HGB BLD-MCNC: 14 G/DL (ref 12–17)
IMM GRANULOCYTES # BLD AUTO: 0.03 THOUSAND/UL (ref 0–0.2)
IMM GRANULOCYTES NFR BLD AUTO: 1 % (ref 0–2)
LYMPHOCYTES # BLD AUTO: 0.91 THOUSANDS/ÂΜL (ref 0.6–4.47)
LYMPHOCYTES NFR BLD AUTO: 14 % (ref 14–44)
MCH RBC QN AUTO: 29.8 PG (ref 26.8–34.3)
MCHC RBC AUTO-ENTMCNC: 31.9 G/DL (ref 31.4–37.4)
MCV RBC AUTO: 93 FL (ref 82–98)
MONOCYTES # BLD AUTO: 0.63 THOUSAND/ÂΜL (ref 0.17–1.22)
MONOCYTES NFR BLD AUTO: 10 % (ref 4–12)
NEUTROPHILS # BLD AUTO: 4.95 THOUSANDS/ÂΜL (ref 1.85–7.62)
NEUTS SEG NFR BLD AUTO: 74 % (ref 43–75)
NRBC BLD AUTO-RTO: 0 /100 WBCS
PLATELET # BLD AUTO: 151 THOUSANDS/UL (ref 149–390)
PMV BLD AUTO: 10.5 FL (ref 8.9–12.7)
POTASSIUM SERPL-SCNC: 4.4 MMOL/L (ref 3.5–5.3)
PROT SERPL-MCNC: 6.9 G/DL (ref 6.4–8.4)
RBC # BLD AUTO: 4.7 MILLION/UL (ref 3.88–5.62)
SODIUM SERPL-SCNC: 139 MMOL/L (ref 135–147)
WBC # BLD AUTO: 6.6 THOUSAND/UL (ref 4.31–10.16)

## 2025-03-18 PROCEDURE — 36415 COLL VENOUS BLD VENIPUNCTURE: CPT

## 2025-03-18 PROCEDURE — 85025 COMPLETE CBC W/AUTO DIFF WBC: CPT

## 2025-03-18 PROCEDURE — 80053 COMPREHEN METABOLIC PANEL: CPT

## 2025-03-21 PROBLEM — A41.9 SEPSIS (HCC): Status: RESOLVED | Noted: 2025-02-19 | Resolved: 2025-03-21

## 2025-03-22 PROBLEM — J18.9 PNEUMONIA OF RIGHT LOWER LOBE DUE TO INFECTIOUS ORGANISM: Status: RESOLVED | Noted: 2025-02-19 | Resolved: 2025-03-22

## 2025-03-24 ENCOUNTER — HOSPITAL ENCOUNTER (OUTPATIENT)
Dept: RADIOLOGY | Facility: MEDICAL CENTER | Age: 80
Discharge: HOME | End: 2025-03-24
Payer: COMMERCIAL

## 2025-03-24 DIAGNOSIS — B96.20 E COLI BACTEREMIA: ICD-10-CM

## 2025-03-24 DIAGNOSIS — K81.9 CHOLECYSTITIS: ICD-10-CM

## 2025-03-24 DIAGNOSIS — G25.81 RLS (RESTLESS LEGS SYNDROME): ICD-10-CM

## 2025-03-24 DIAGNOSIS — R78.81 E COLI BACTEREMIA: ICD-10-CM

## 2025-03-24 DIAGNOSIS — K75.0 LIVER ABSCESS: ICD-10-CM

## 2025-03-24 PROCEDURE — 74177 CT ABD & PELVIS W/CONTRAST: CPT

## 2025-03-24 RX ADMIN — IOHEXOL 100 ML: 350 INJECTION, SOLUTION INTRAVENOUS at 14:30

## 2025-03-24 NOTE — TELEPHONE ENCOUNTER
Reason for call:   [x] Refill   [] Prior Auth  [] Other:     Office:   [x] PCP/Provider -   [] Specialty/Provider -     Medication:   rOPINIRole (REQUIP) 0.5 mg tablet     Dose/Frequency: Take 1 tablet by mouth 3 (three) times a day. BY MOUTH     Quantity: 270    Pharmacy: [facility]     Mail Away Pharmacy   Does the patient have enough for 10 days?   [x] Yes   [] No - Send as HP to POD

## 2025-03-25 ENCOUNTER — HOME CARE VISIT (OUTPATIENT)
Dept: HOME HEALTH SERVICES | Facility: HOME HEALTHCARE | Age: 80
End: 2025-03-25
Payer: COMMERCIAL

## 2025-03-25 ENCOUNTER — APPOINTMENT (OUTPATIENT)
Dept: LAB | Facility: MEDICAL CENTER | Age: 80
End: 2025-03-25
Payer: COMMERCIAL

## 2025-03-25 DIAGNOSIS — K75.0 LIVER ABSCESS: ICD-10-CM

## 2025-03-25 DIAGNOSIS — B96.20 E COLI BACTEREMIA: ICD-10-CM

## 2025-03-25 DIAGNOSIS — K81.9 CHOLECYSTITIS: ICD-10-CM

## 2025-03-25 DIAGNOSIS — R78.81 E COLI BACTEREMIA: ICD-10-CM

## 2025-03-25 LAB
ALBUMIN SERPL BCG-MCNC: 3.5 G/DL (ref 3.5–5)
ALP SERPL-CCNC: 76 U/L (ref 34–104)
ALT SERPL W P-5'-P-CCNC: 12 U/L (ref 7–52)
ANION GAP SERPL CALCULATED.3IONS-SCNC: 8 MMOL/L (ref 4–13)
AST SERPL W P-5'-P-CCNC: 17 U/L (ref 13–39)
BASOPHILS # BLD AUTO: 0.04 THOUSANDS/ÂΜL (ref 0–0.1)
BASOPHILS NFR BLD AUTO: 1 % (ref 0–1)
BILIRUB SERPL-MCNC: 0.38 MG/DL (ref 0.2–1)
BUN SERPL-MCNC: 9 MG/DL (ref 5–25)
CALCIUM SERPL-MCNC: 8.7 MG/DL (ref 8.4–10.2)
CHLORIDE SERPL-SCNC: 103 MMOL/L (ref 96–108)
CO2 SERPL-SCNC: 29 MMOL/L (ref 21–32)
CREAT SERPL-MCNC: 0.83 MG/DL (ref 0.6–1.3)
EOSINOPHIL # BLD AUTO: 0.12 THOUSAND/ÂΜL (ref 0–0.61)
EOSINOPHIL NFR BLD AUTO: 3 % (ref 0–6)
ERYTHROCYTE [DISTWIDTH] IN BLOOD BY AUTOMATED COUNT: 14.5 % (ref 11.6–15.1)
GFR SERPL CREATININE-BSD FRML MDRD: 83 ML/MIN/1.73SQ M
GLUCOSE P FAST SERPL-MCNC: 90 MG/DL (ref 65–99)
HCT VFR BLD AUTO: 40.4 % (ref 36.5–49.3)
HGB BLD-MCNC: 13.3 G/DL (ref 12–17)
IMM GRANULOCYTES # BLD AUTO: 0.02 THOUSAND/UL (ref 0–0.2)
IMM GRANULOCYTES NFR BLD AUTO: 0 % (ref 0–2)
LYMPHOCYTES # BLD AUTO: 0.97 THOUSANDS/ÂΜL (ref 0.6–4.47)
LYMPHOCYTES NFR BLD AUTO: 22 % (ref 14–44)
MCH RBC QN AUTO: 30.6 PG (ref 26.8–34.3)
MCHC RBC AUTO-ENTMCNC: 32.9 G/DL (ref 31.4–37.4)
MCV RBC AUTO: 93 FL (ref 82–98)
MONOCYTES # BLD AUTO: 0.41 THOUSAND/ÂΜL (ref 0.17–1.22)
MONOCYTES NFR BLD AUTO: 9 % (ref 4–12)
NEUTROPHILS # BLD AUTO: 2.91 THOUSANDS/ÂΜL (ref 1.85–7.62)
NEUTS SEG NFR BLD AUTO: 65 % (ref 43–75)
NRBC BLD AUTO-RTO: 0 /100 WBCS
PLATELET # BLD AUTO: 192 THOUSANDS/UL (ref 149–390)
PMV BLD AUTO: 9.2 FL (ref 8.9–12.7)
POTASSIUM SERPL-SCNC: 4 MMOL/L (ref 3.5–5.3)
PROT SERPL-MCNC: 6.1 G/DL (ref 6.4–8.4)
RBC # BLD AUTO: 4.35 MILLION/UL (ref 3.88–5.62)
SODIUM SERPL-SCNC: 140 MMOL/L (ref 135–147)
WBC # BLD AUTO: 4.47 THOUSAND/UL (ref 4.31–10.16)

## 2025-03-25 PROCEDURE — 36415 COLL VENOUS BLD VENIPUNCTURE: CPT

## 2025-03-25 PROCEDURE — 80053 COMPREHEN METABOLIC PANEL: CPT

## 2025-03-25 PROCEDURE — 85025 COMPLETE CBC W/AUTO DIFF WBC: CPT

## 2025-03-25 RX ORDER — ROPINIROLE 0.5 MG/1
0.5 TABLET, FILM COATED ORAL 3 TIMES DAILY
Qty: 270 TABLET | Refills: 0 | Status: SHIPPED | OUTPATIENT
Start: 2025-03-25

## 2025-03-28 ENCOUNTER — OFFICE VISIT (OUTPATIENT)
Dept: CARDIOLOGY CLINIC | Facility: CLINIC | Age: 80
End: 2025-03-28
Payer: COMMERCIAL

## 2025-03-28 ENCOUNTER — TELEPHONE (OUTPATIENT)
Age: 80
End: 2025-03-28

## 2025-03-28 ENCOUNTER — RESULTS FOLLOW-UP (OUTPATIENT)
Dept: OTHER | Facility: HOSPITAL | Age: 80
End: 2025-03-28

## 2025-03-28 VITALS
HEART RATE: 58 BPM | HEIGHT: 67 IN | DIASTOLIC BLOOD PRESSURE: 70 MMHG | WEIGHT: 159 LBS | SYSTOLIC BLOOD PRESSURE: 110 MMHG | BODY MASS INDEX: 24.96 KG/M2 | OXYGEN SATURATION: 99 %

## 2025-03-28 DIAGNOSIS — I10 ESSENTIAL HYPERTENSION: ICD-10-CM

## 2025-03-28 DIAGNOSIS — I48.91 A-FIB (HCC): ICD-10-CM

## 2025-03-28 DIAGNOSIS — I48.0 PAF (PAROXYSMAL ATRIAL FIBRILLATION) (HCC): ICD-10-CM

## 2025-03-28 DIAGNOSIS — I48.0 PAROXYSMAL ATRIAL FIBRILLATION (HCC): ICD-10-CM

## 2025-03-28 PROCEDURE — 93000 ELECTROCARDIOGRAM COMPLETE: CPT

## 2025-03-28 PROCEDURE — 99214 OFFICE O/P EST MOD 30 MIN: CPT

## 2025-03-28 RX ORDER — ACETAMINOPHEN 325 MG/1
325 TABLET ORAL 2 TIMES DAILY
COMMUNITY

## 2025-03-28 RX ORDER — AMIODARONE HYDROCHLORIDE 200 MG/1
200 TABLET ORAL DAILY
Qty: 60 TABLET | Refills: 0 | Status: SHIPPED | OUTPATIENT
Start: 2025-03-28

## 2025-03-28 RX ORDER — METRONIDAZOLE 500 MG/1
500 TABLET ORAL EVERY 12 HOURS SCHEDULED
COMMUNITY

## 2025-03-28 NOTE — TELEPHONE ENCOUNTER
Called Radiology Reading Room and spoke with Judith today.   Informed judith I was calling to have patients CT read. Judith states she will send it to be read today.     Called and spoke with Davin today.   Informed Davin I was calling to follow up on refills that may be needed prior to appointment. Davin states patient took his last dose of flagyl today. Davin states he is not currently with the patient but believes he has enough Cefpodoxime until his appointment. Davin states if he is to continue both please send the refills to [facility] that was confirmed in patients chart.
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
## 2025-03-28 NOTE — Clinical Note
Xena Ness,  Patient was asking how long he will need to be on amiodarone-- no reported side effects. I wanted to reach out to you to see if you would recommend continuing long term due to his age or stopping after the 3 months, allow for washout and obtain zio?   Thanks!!

## 2025-03-28 NOTE — TELEPHONE ENCOUNTER
Called and spoke with Davin today.   Informed Davin of Dr. Bustos's attached note regarding CT scan and antibiotics.   Patient is also rescheduled for appointment 4/2/25 at 10:30am with Dr. Hernandez at the [place] office.   Davin verbalizes understanding at this time.

## 2025-03-28 NOTE — TELEPHONE ENCOUNTER
Davin calling, on communication list asking if patient needs to continue flagyl up until office visit 4/10. If so they would need a refill sent to [facility] in [place], please advise.

## 2025-03-28 NOTE — TELEPHONE ENCOUNTER
----- Message from Sujey Bustos MD sent at 3/28/2025  1:03 PM EDT -----  I just got the read back from radiology.  Lets see if we can get the patient in this week just to review the imaging and total but he can stay off of antibiotics and be seen in follow-up.  There are a few incidental findings that I do want reviewed with him so that he is aware to follow-up with his PCP and to have subsequent imaging.  In overall we just want to see how he is doing post and that there are no lingering side effects from antibiotics etc.  Let me know if questions.

## 2025-04-01 ENCOUNTER — HOME CARE VISIT (OUTPATIENT)
Dept: HOME HEALTH SERVICES | Facility: HOME HEALTHCARE | Age: 80
End: 2025-04-01
Payer: COMMERCIAL

## 2025-04-01 ENCOUNTER — APPOINTMENT (OUTPATIENT)
Dept: LAB | Facility: MEDICAL CENTER | Age: 80
End: 2025-04-01
Payer: COMMERCIAL

## 2025-04-01 DIAGNOSIS — B96.20 E COLI BACTEREMIA: ICD-10-CM

## 2025-04-01 DIAGNOSIS — R78.81 E COLI BACTEREMIA: ICD-10-CM

## 2025-04-01 DIAGNOSIS — K75.0 LIVER ABSCESS: ICD-10-CM

## 2025-04-01 DIAGNOSIS — K81.9 CHOLECYSTITIS: ICD-10-CM

## 2025-04-01 LAB
ALBUMIN SERPL BCG-MCNC: 3.7 G/DL (ref 3.5–5)
ALP SERPL-CCNC: 71 U/L (ref 34–104)
ALT SERPL W P-5'-P-CCNC: 13 U/L (ref 7–52)
ANION GAP SERPL CALCULATED.3IONS-SCNC: 8 MMOL/L (ref 4–13)
AST SERPL W P-5'-P-CCNC: 16 U/L (ref 13–39)
BASOPHILS # BLD AUTO: 0.03 THOUSANDS/ÂΜL (ref 0–0.1)
BASOPHILS NFR BLD AUTO: 1 % (ref 0–1)
BILIRUB SERPL-MCNC: 0.53 MG/DL (ref 0.2–1)
BUN SERPL-MCNC: 10 MG/DL (ref 5–25)
CALCIUM SERPL-MCNC: 8.8 MG/DL (ref 8.4–10.2)
CHLORIDE SERPL-SCNC: 104 MMOL/L (ref 96–108)
CO2 SERPL-SCNC: 27 MMOL/L (ref 21–32)
CREAT SERPL-MCNC: 0.9 MG/DL (ref 0.6–1.3)
EOSINOPHIL # BLD AUTO: 0.11 THOUSAND/ÂΜL (ref 0–0.61)
EOSINOPHIL NFR BLD AUTO: 2 % (ref 0–6)
ERYTHROCYTE [DISTWIDTH] IN BLOOD BY AUTOMATED COUNT: 14.8 % (ref 11.6–15.1)
GFR SERPL CREATININE-BSD FRML MDRD: 80 ML/MIN/1.73SQ M
GLUCOSE P FAST SERPL-MCNC: 89 MG/DL (ref 65–99)
HCT VFR BLD AUTO: 42.4 % (ref 36.5–49.3)
HGB BLD-MCNC: 13.6 G/DL (ref 12–17)
IMM GRANULOCYTES # BLD AUTO: 0.01 THOUSAND/UL (ref 0–0.2)
IMM GRANULOCYTES NFR BLD AUTO: 0 % (ref 0–2)
LYMPHOCYTES # BLD AUTO: 1.15 THOUSANDS/ÂΜL (ref 0.6–4.47)
LYMPHOCYTES NFR BLD AUTO: 17 % (ref 14–44)
MCH RBC QN AUTO: 30.3 PG (ref 26.8–34.3)
MCHC RBC AUTO-ENTMCNC: 32.1 G/DL (ref 31.4–37.4)
MCV RBC AUTO: 94 FL (ref 82–98)
MONOCYTES # BLD AUTO: 0.46 THOUSAND/ÂΜL (ref 0.17–1.22)
MONOCYTES NFR BLD AUTO: 7 % (ref 4–12)
NEUTROPHILS # BLD AUTO: 4.88 THOUSANDS/ÂΜL (ref 1.85–7.62)
NEUTS SEG NFR BLD AUTO: 73 % (ref 43–75)
NRBC BLD AUTO-RTO: 0 /100 WBCS
PLATELET # BLD AUTO: 201 THOUSANDS/UL (ref 149–390)
PMV BLD AUTO: 9.4 FL (ref 8.9–12.7)
POTASSIUM SERPL-SCNC: 4.2 MMOL/L (ref 3.5–5.3)
PROT SERPL-MCNC: 6.4 G/DL (ref 6.4–8.4)
RBC # BLD AUTO: 4.49 MILLION/UL (ref 3.88–5.62)
SODIUM SERPL-SCNC: 139 MMOL/L (ref 135–147)
WBC # BLD AUTO: 6.64 THOUSAND/UL (ref 4.31–10.16)

## 2025-04-01 PROCEDURE — 36415 COLL VENOUS BLD VENIPUNCTURE: CPT

## 2025-04-01 PROCEDURE — 80053 COMPREHEN METABOLIC PANEL: CPT

## 2025-04-01 PROCEDURE — 85025 COMPLETE CBC W/AUTO DIFF WBC: CPT

## 2025-04-02 ENCOUNTER — HOSPITAL ENCOUNTER (OUTPATIENT)
Dept: NON INVASIVE DIAGNOSTICS | Facility: HOSPITAL | Age: 80
Discharge: HOME | End: 2025-04-02
Attending: RADIOLOGY | Admitting: RADIOLOGY
Payer: COMMERCIAL

## 2025-04-02 ENCOUNTER — OFFICE VISIT (OUTPATIENT)
Dept: INFECTIOUS DISEASES | Facility: CLINIC | Age: 80
End: 2025-04-02
Payer: COMMERCIAL

## 2025-04-02 VITALS
BODY MASS INDEX: 24.96 KG/M2 | RESPIRATION RATE: 18 BRPM | WEIGHT: 159 LBS | SYSTOLIC BLOOD PRESSURE: 112 MMHG | HEIGHT: 67 IN | TEMPERATURE: 97.5 F | DIASTOLIC BLOOD PRESSURE: 66 MMHG | OXYGEN SATURATION: 98 % | HEART RATE: 61 BPM

## 2025-04-02 DIAGNOSIS — K75.0 LIVER ABSCESS: ICD-10-CM

## 2025-04-02 DIAGNOSIS — R78.81 BACTEREMIA: ICD-10-CM

## 2025-04-02 DIAGNOSIS — K81.9 CHOLECYSTITIS: ICD-10-CM

## 2025-04-02 DIAGNOSIS — B96.20 E COLI BACTEREMIA: Primary | ICD-10-CM

## 2025-04-02 DIAGNOSIS — R78.81 E COLI BACTEREMIA: Primary | ICD-10-CM

## 2025-04-02 PROCEDURE — 47531 INJECTION FOR CHOLANGIOGRAM: CPT | Performed by: RADIOLOGY

## 2025-04-02 PROCEDURE — 99213 OFFICE O/P EST LOW 20 MIN: CPT | Performed by: INTERNAL MEDICINE

## 2025-04-02 PROCEDURE — 47531 INJECTION FOR CHOLANGIOGRAM: CPT

## 2025-04-02 RX ADMIN — IOHEXOL 5 ML: 350 INJECTION, SOLUTION INTRAVENOUS at 08:53

## 2025-04-02 NOTE — ASSESSMENT & PLAN NOTE
Complication of recent episode of cholecystitis.  As above, treated with prolonged course of Vantin/Flagyl.  Abscess has resolved on most recent CT.   No further antibiotic indicated for this issue

## 2025-04-02 NOTE — PROGRESS NOTES
Name: Devin Cutler      : 1945      MRN: [id number]  Encounter Provider: Celeste Hernandez DO  Encounter Date: 2025   Encounter department: [facility] [place]  :  Assessment & Plan  E coli bacteremia  Secondary to cholecystitis complicated by liver abscess development. EKG without significantly prolonged QTc.  Patient was discharged on prolonged course of Vantin/Flagyl to continue until radiologic resolution of abscess.  Most recent CT reviewed with resolution of abscess and minimal lingering postinflammatory edema around the gallbladder fossa, with cholecystostomy tube in place.  Antibiotics have since been held given resolution of abscess.  Most recent labs from 2025 reviewed.   No further antibiotic indicated  Liver abscess  Complication of recent episode of cholecystitis.  As above, treated with prolonged course of Vantin/Flagyl.  Abscess has resolved on most recent CT.   No further antibiotic indicated for this issue  Cholecystitis  Recent hospitalization 2025 secondary to cholecystitis complicated by liver abscess and E. coli bacteremia, as above.  Underwent cholecystostomy tube placement 2025.   Ongoing IR drain management   Patient has appointment with surgery tomorrow to discuss cholecystectomy       Above plan was discussed with patient and son-in-law at bedside and all questions answered.  Follow-up on an as-needed basis if new questions/concerns arise.      History of Present Illness   HPI  Devin Cutler is a 80 y.o. male who presents for hospital follow-up regarding recent hospitalization for E. coli bacteremia secondary to cholecystitis complicated by liver abscess.  Patient underwent follow-up CT on 3/24/2025 with resolution of abscess and minimal lingering postinflammatory edema around the gallbladder fossa.  Cholecystostomy tube remains in proper position.  Antibiotic has since been held based on CT findings.  Patient has been off  antibiotics for about 5 days.  He states he feels great with no pain, fevers, chills.  He is tolerating oral intake without issue.  He underwent IR tube check this morning.  He has an appointment with surgery tomorrow to discuss cholecystectomy.      Complete 12 point review of systems was performed and negative except as per HPI    Review of Systems  Pertinent Medical History         Medical History Reviewed by provider this encounter:     .  Past Medical History   Past Medical History:   Diagnosis Date    GERD (gastroesophageal reflux disease)     Right hip pain 4/22/2021     Past Surgical History:   Procedure Laterality Date    IR CHOLECYSTOSTOMY TUBE PLACEMENT  2/21/2025     Family History   Family history unknown: Yes      reports that he has quit smoking. His smoking use included cigarettes. He has been exposed to tobacco smoke. He has never used smokeless tobacco. He reports current alcohol use. He reports that he does not use drugs.  Current Outpatient Medications   Medication Instructions    acetaminophen (TYLENOL) 325 mg, 2 times daily    ALPRAZolam (XANAX) 0.5 mg tablet TAKE 1 TABLET THREE TIMES DAILY AS NEEDED FOR ANXIETY    amiodarone 200 mg, Oral, Daily    apixaban (ELIQUIS) 5 mg, Oral, 2 times daily    cefpodoxime (VANTIN) 400 mg, Oral, 2 times daily    cefpodoxime (VANTIN) 400 mg, Oral, 2 times daily with meals    celecoxib (CELEBREX) 200 mg, Oral, Daily    Cholecalciferol 125 mcg, Daily    Diclofenac Sodium (VOLTAREN) 1 % Apply topically NOT TAKING    lansoprazole (PREVACID) 30 mg, Oral, Daily, 1 capsule by mouth daily    levothyroxine (SYNTHROID) 25 mcg, Daily    lidocaine (LIDODERM) 5 % 1 patch, Daily PRN    metoprolol tartrate (LOPRESSOR) 25 mg, Oral, 2 times daily    metroNIDAZOLE (FLAGYL) 500 mg, Every 12 hours scheduled    rOPINIRole (REQUIP) 0.5 mg, Oral, 3 times daily    sodium chloride, PF, 0.9 % 10 mL, Intracatheter, Daily, Intracatheter flushing daily. May substitute prefilled syringe  with normal saline 10 mL vials, 10 mL syringes, and 18 g blunt needles   No Known Allergies   Current Outpatient Medications on File Prior to Visit   Medication Sig Dispense Refill    acetaminophen (TYLENOL) 325 mg tablet Take 325 mg by mouth 2 (two) times a day      ALPRAZolam (XANAX) 0.5 mg tablet TAKE 1 TABLET THREE TIMES DAILY AS NEEDED FOR ANXIETY (Patient taking differently: Take 0.5 mg by mouth if needed) 90 tablet 5    amiodarone 200 mg tablet Take 1 tablet (200 mg total) by mouth daily 60 tablet 0    apixaban (ELIQUIS) 5 mg Take 1 tablet (5 mg total) by mouth 2 (two) times a day 180 tablet 3    cefpodoxime (VANTIN) 200 mg tablet Take 2 tablets (400 mg total) by mouth 2 (two) times a day for 14 days Do not start before March 25, 2025. 56 tablet 0    cefpodoxime (VANTIN) 200 mg tablet Take 2 tablets (400 mg total) by mouth 2 (two) times a day with meals (Patient not taking: Reported on 3/28/2025) 120 tablet 0    celecoxib (CeleBREX) 200 mg capsule Take 1 capsule (200 mg total) by mouth daily (Patient not taking: Reported on 3/28/2025)      Cholecalciferol 125 MCG (5000 UT) capsule Take 125 mcg by mouth daily      Diclofenac Sodium (VOLTAREN) 1 % Apply topically NOT TAKING (Patient not taking: Reported on 3/28/2025)      lansoprazole (PREVACID) 30 mg capsule Take 1 capsule (30 mg total) by mouth daily 1 capsule by mouth daily      levothyroxine (Synthroid) 25 mcg tablet Take 25 mcg by mouth daily      lidocaine (LIDODERM) 5 % Apply 1 patch topically daily as needed (pain) Remove patch after 12 hours and discard. Fold used patch in half, sticky sides together and discard. Wash hands after use      metoprolol tartrate (LOPRESSOR) 25 mg tablet Take 1 tablet (25 mg total) by mouth 2 (two) times a day      metroNIDAZOLE (Flagyl) 500 mg tablet Take 500 mg by mouth every 12 (twelve) hours      rOPINIRole (REQUIP) 0.5 mg tablet Take 1 tablet (0.5 mg total) by mouth 3 (three) times a day 270 tablet 0    sodium  chloride, PF, 0.9 % 10 mL by Intracatheter route daily for 120 doses Intracatheter flushing daily. May substitute prefilled syringe with normal saline 10 mL vials, 10 mL syringes, and 18 g blunt needles 300 mL 3     Current Facility-Administered Medications on File Prior to Visit   Medication Dose Route Frequency Provider Last Rate Last Admin    [COMPLETED] iohexol (OMNIPAQUE) 350 MG/ML injection (MULTI-DOSE) 50 mL  50 mL Other Once in imaging Benji Powell, DO   5 mL at 04/02/25 0853      Social History     Tobacco Use    Smoking status: Former     Types: Cigarettes     Passive exposure: Past    Smokeless tobacco: Never    Tobacco comments:     per [facility]   Vaping Use    Vaping status: Never Used   Substance and Sexual Activity    Alcohol use: Yes     Comment: occasional    Drug use: Never     Comment: No use    Sexual activity: Not on file        Objective   There were no vitals taken for this visit.     Physical Exam  General:  Well-nourished, well-developed, in no acute distress  Eyes:  Conjunctive clear with no hemorrhages or effusions  Oropharynx:  No visible oral lesions  Neck: Trachea midline  Lungs: Nonlabored respiration  Abdomen: Nondistended with no rigidity or guarding, cholecystostomy tube in place  Extremities:  No peripheral cyanosis, clubbing, or edema  Skin:  No visible rashes or draining wounds  Neurological:  Moves all four extremities spontaneously

## 2025-04-02 NOTE — ASSESSMENT & PLAN NOTE
Secondary to cholecystitis complicated by liver abscess development. EKG without significantly prolonged QTc.  Patient was discharged on prolonged course of Vantin/Flagyl to continue until radiologic resolution of abscess.  Most recent CT reviewed with resolution of abscess and minimal lingering postinflammatory edema around the gallbladder fossa, with cholecystostomy tube in place.  Antibiotics have since been held given resolution of abscess.  Most recent labs from 4/1/2025 reviewed.   No further antibiotic indicated

## 2025-04-02 NOTE — ASSESSMENT & PLAN NOTE
Recent hospitalization February 2025 secondary to cholecystitis complicated by liver abscess and E. coli bacteremia, as above.  Underwent cholecystostomy tube placement 2/21/2025.   Ongoing IR drain management   Patient has appointment with surgery tomorrow to discuss cholecystectomy

## 2025-04-02 NOTE — SEDATION DOCUMENTATION
Contrast injected and images taken for Dr. Powell to review, right Choley tube, patient follow up with MD tomorrow for f/u visit for surgery, no exchange needed at this time.

## 2025-04-03 ENCOUNTER — PREP FOR PROCEDURE (OUTPATIENT)
Dept: SURGERY | Facility: CLINIC | Age: 80
End: 2025-04-03

## 2025-04-03 ENCOUNTER — OFFICE VISIT (OUTPATIENT)
Dept: SURGERY | Facility: CLINIC | Age: 80
End: 2025-04-03
Payer: COMMERCIAL

## 2025-04-03 VITALS — WEIGHT: 158 LBS | HEIGHT: 68 IN | BODY MASS INDEX: 23.95 KG/M2

## 2025-04-03 DIAGNOSIS — K80.10 CHRONIC CHOLECYSTITIS WITH CALCULUS: Primary | ICD-10-CM

## 2025-04-03 PROCEDURE — 99214 OFFICE O/P EST MOD 30 MIN: CPT | Performed by: SURGERY

## 2025-04-03 NOTE — LETTER
April 3, 2025     Momo Trinidad MD  [address]  [facility] [address]    Patient: Devin Cutler   YOB: 1945   Date of Visit: 4/3/2025       Dear MD Evans Negron CRNP Michael Durkin, MD:    Thank you for referring Devin Cutler to me for evaluation. Below are my notes for this consultation.    If you have questions, please do not hesitate to call me. I look forward to following your patient along with you.         Sincerely,        Kelvin Laguerre DO        CC: SHAHRAM Moe MD Richard Wells Conron, DO  4/3/2025  2:01 PM  Sign when Signing Visit  Name: Devin Cutler      : 1945      MRN: [id number]  Encounter Provider: Kelvin Laguerre DO  Encounter Date: 4/3/2025   Encounter department: [facility] [place]  :  Assessment & Plan  Chronic cholecystitis with calculus  Discussed risks and benefits of a laparoscopic cholecystectomy including the potential for bile duct injury, bowel injury, or need for open surgery and he agrees to proceed.  Will hold his Eliquis for 2 days.           History of Present Illness  HPI  Devin Cutler is a 80 y.o. male who presents for hospital follow up.  Hospitalized  to  for cholecystitis.  He has a cholecystostomy tube and had a tube check on 2025.  Denies pain.    Admitted in late February with acute calculus cholecystitis.  Ultrasound revealed sludge and/or debris.  His imaging on admission also revealed an abscess in the liver.  This reason a percutaneous cholecystostomy tube was placed and infectious disease was consulted.  Fortunately he did well.  Recent cholecystostomy tube check reveals good flow of dye out of the gallbladder into the common duct and duodenum.  Gallbladder is contracted as expected.  He has been seen by infectious disease.  Antibiotics has been halted.  He was seen by his cardiologist we had an episode of some paroxysmal atrial  "fibrillation.  This is felt to be secondary to cholecystitis/sepsis.  He is now in sinus rhythm.  He does remain on amiodarone and Eliquis.  He is in sinus rhythm here in the office.    History obtained from: patient    Review of Systems   All other systems reviewed and are negative.    Medical History Reviewed by provider this encounter:     .     Objective  Ht 5' 8\" (1.727 m)   Wt 71.7 kg (158 lb)   BMI 24.02 kg/m²      Physical Exam  Constitutional:       General: He is not in acute distress.  HENT:      Head: Atraumatic.      Mouth/Throat:      Mouth: Mucous membranes are moist.   Eyes:      Extraocular Movements: Extraocular movements intact.   Cardiovascular:      Rate and Rhythm: Normal rate.   Pulmonary:      Effort: Pulmonary effort is normal.   Abdominal:      General: Abdomen is flat.      Palpations: Abdomen is soft. There is no mass.      Tenderness: There is no abdominal tenderness.      Hernia: No hernia is present.      Comments: Right sided cholecystostomy tube in place   Musculoskeletal:      Cervical back: Normal range of motion.   Skin:     General: Skin is warm and dry.   Neurological:      Mental Status: He is alert.           "

## 2025-04-03 NOTE — PROGRESS NOTES
"Name: Devin Cutler      : 1945      MRN: [id number]  Encounter Provider: Kelvin Laguerre DO  Encounter Date: 4/3/2025   Encounter department: [facility] [place]  :  Assessment & Plan  Chronic cholecystitis with calculus  Discussed risks and benefits of a laparoscopic cholecystectomy including the potential for bile duct injury, bowel injury, or need for open surgery and he agrees to proceed.  Will hold his Eliquis for 2 days.           History of Present Illness   HPI  Devin Cutler is a 80 y.o. male who presents for hospital follow up.  Hospitalized  to  for cholecystitis.  He has a cholecystostomy tube and had a tube check on 2025.  Denies pain.    Admitted in late February with acute calculus cholecystitis.  Ultrasound revealed sludge and/or debris.  His imaging on admission also revealed an abscess in the liver.  This reason a percutaneous cholecystostomy tube was placed and infectious disease was consulted.  Fortunately he did well.  Recent cholecystostomy tube check reveals good flow of dye out of the gallbladder into the common duct and duodenum.  Gallbladder is contracted as expected.  He has been seen by infectious disease.  Antibiotics has been halted.  He was seen by his cardiologist we had an episode of some paroxysmal atrial fibrillation.  This is felt to be secondary to cholecystitis/sepsis.  He is now in sinus rhythm.  He does remain on amiodarone and Eliquis.  He is in sinus rhythm here in the office.    History obtained from: patient    Review of Systems   All other systems reviewed and are negative.    Medical History Reviewed by provider this encounter:     .     Objective   Ht 5' 8\" (1.727 m)   Wt 71.7 kg (158 lb)   BMI 24.02 kg/m²      Physical Exam  Constitutional:       General: He is not in acute distress.  HENT:      Head: Atraumatic.      Mouth/Throat:      Mouth: Mucous membranes are moist.   Eyes:      Extraocular Movements: Extraocular " movements intact.   Cardiovascular:      Rate and Rhythm: Normal rate.   Pulmonary:      Effort: Pulmonary effort is normal.   Abdominal:      General: Abdomen is flat.      Palpations: Abdomen is soft. There is no mass.      Tenderness: There is no abdominal tenderness.      Hernia: No hernia is present.      Comments: Right sided cholecystostomy tube in place   Musculoskeletal:      Cervical back: Normal range of motion.   Skin:     General: Skin is warm and dry.   Neurological:      Mental Status: He is alert.

## 2025-04-03 NOTE — ASSESSMENT & PLAN NOTE
Discussed risks and benefits of a laparoscopic cholecystectomy including the potential for bile duct injury, bowel injury, or need for open surgery and he agrees to proceed.  Will hold his Eliquis for 2 days.

## 2025-04-03 NOTE — H&P (VIEW-ONLY)
"Name: Devin Cutler      : 1945      MRN: [id number]  Encounter Provider: Kelvin Laguerre DO  Encounter Date: 4/3/2025   Encounter department: [facility] [place]  :  Assessment & Plan  Chronic cholecystitis with calculus  Discussed risks and benefits of a laparoscopic cholecystectomy including the potential for bile duct injury, bowel injury, or need for open surgery and he agrees to proceed.  Will hold his Eliquis for 2 days.           History of Present Illness   HPI  Devin Cutler is a 80 y.o. male who presents for hospital follow up.  Hospitalized  to  for cholecystitis.  He has a cholecystostomy tube and had a tube check on 2025.  Denies pain.    Admitted in late February with acute calculus cholecystitis.  Ultrasound revealed sludge and/or debris.  His imaging on admission also revealed an abscess in the liver.  This reason a percutaneous cholecystostomy tube was placed and infectious disease was consulted.  Fortunately he did well.  Recent cholecystostomy tube check reveals good flow of dye out of the gallbladder into the common duct and duodenum.  Gallbladder is contracted as expected.  He has been seen by infectious disease.  Antibiotics has been halted.  He was seen by his cardiologist we had an episode of some paroxysmal atrial fibrillation.  This is felt to be secondary to cholecystitis/sepsis.  He is now in sinus rhythm.  He does remain on amiodarone and Eliquis.  He is in sinus rhythm here in the office.    History obtained from: patient    Review of Systems   All other systems reviewed and are negative.    Medical History Reviewed by provider this encounter:     .     Objective   Ht 5' 8\" (1.727 m)   Wt 71.7 kg (158 lb)   BMI 24.02 kg/m²      Physical Exam  Constitutional:       General: He is not in acute distress.  HENT:      Head: Atraumatic.      Mouth/Throat:      Mouth: Mucous membranes are moist.   Eyes:      Extraocular Movements: Extraocular " movements intact.   Cardiovascular:      Rate and Rhythm: Normal rate.   Pulmonary:      Effort: Pulmonary effort is normal.   Abdominal:      General: Abdomen is flat.      Palpations: Abdomen is soft. There is no mass.      Tenderness: There is no abdominal tenderness.      Hernia: No hernia is present.      Comments: Right sided cholecystostomy tube in place   Musculoskeletal:      Cervical back: Normal range of motion.   Skin:     General: Skin is warm and dry.   Neurological:      Mental Status: He is alert.

## 2025-04-07 ENCOUNTER — HOME CARE VISIT (OUTPATIENT)
Dept: HOME HEALTH SERVICES | Facility: HOME HEALTHCARE | Age: 80
End: 2025-04-07
Payer: COMMERCIAL

## 2025-04-16 NOTE — PRE-PROCEDURE INSTRUCTIONS
Pre-Surgery Instructions:   Medication Instructions    acetaminophen (TYLENOL) 325 mg tablet Uses PRN- OK to take day of surgery    ALPRAZolam (XANAX) 0.5 mg tablet Take as directed    amiodarone 200 mg tablet Take as directed    apixaban (ELIQUIS) 5 mg Stop taking 2 days prior to surgery. As per surgeon. LD 4/22/25    Cholecalciferol 125 MCG (5000 UT) capsule Stop taking 7 days prior to surgery.    lansoprazole (PREVACID) 30 mg capsule Take as directed    levothyroxine (Synthroid) 25 mcg tablet Take day of surgery.    lidocaine (LIDODERM) 5 % Hold day of surgery.    metoprolol tartrate (LOPRESSOR) 25 mg tablet Take day of surgery.    rOPINIRole (REQUIP) 0.5 mg tablet Take as directed        Medication instructions for day of surgery reviewed. Please take all instructed medications with only a sip of water.       You will receive a call one business day prior to surgery with an arrival time and hospital directions. If your surgery is scheduled on a Monday, the hospital will be calling you on the Friday prior to your surgery. If you have not heard from anyone by 8pm, please call the hospital supervisor through the hospital  at [phone]. ([place] [phone] or [place] [phone]).    Do not eat or drink anything after midnight the night before your surgery, including candy, mints, lifesavers, or chewing gum. Do not drink alcohol 24hrs before your surgery. Try not to smoke at least 24hrs before your surgery.       Follow the pre surgery showering instructions as listed in the “My Surgical Experience Booklet” or otherwise provided by your surgeon's office. Do not use a blade to shave the surgical area 1 week before surgery. It is okay to use a clean electric clippers up to 24 hours before surgery. Do not apply any lotions, creams, including makeup, cologne, deodorant, or perfumes after showering on the day of your surgery. Do not use dry shampoo, hair spray, hair gel, or any type of hair products.      No contact lenses, eye make-up, or artificial eyelashes. Remove nail polish, including gel polish, and any artificial, gel, or acrylic nails if possible. Remove all jewelry including rings and body piercing jewelry.     Wear causal clothing that is easy to take on and off. Consider your type of surgery.    Keep any valuables, jewelry, piercings at home. Please bring any specially ordered equipment (sling, braces) if indicated.    Arrange for a responsible person to drive you to and from the hospital on the day of your surgery. Please confirm the visitor policy for the day of your procedure when you receive your phone call with an arrival time.     Call the surgeon's office with any new illnesses, exposures, or additional questions prior to surgery.    Please reference your “My Surgical Experience Booklet” for additional information to prepare for your upcoming surgery.

## 2025-04-17 RX ORDER — DIPHENHYDRAMINE HCL 25 MG
25 TABLET ORAL 2 TIMES DAILY
COMMUNITY

## 2025-04-21 ENCOUNTER — PRE-ADMISSION TESTING (OUTPATIENT)
Dept: PREADMISSION TESTING | Facility: HOSPITAL | Age: 80
End: 2025-04-21
Payer: COMMERCIAL

## 2025-04-25 ENCOUNTER — ANESTHESIA (OUTPATIENT)
Dept: PERIOP | Facility: HOSPITAL | Age: 80
End: 2025-04-25
Payer: COMMERCIAL

## 2025-04-25 ENCOUNTER — HOSPITAL ENCOUNTER (OUTPATIENT)
Facility: HOSPITAL | Age: 80
Setting detail: OUTPATIENT SURGERY
Discharge: HOME | End: 2025-04-25
Attending: SURGERY | Admitting: SURGERY
Payer: COMMERCIAL

## 2025-04-25 ENCOUNTER — ANESTHESIA EVENT (OUTPATIENT)
Dept: PERIOP | Facility: HOSPITAL | Age: 80
End: 2025-04-25
Payer: COMMERCIAL

## 2025-04-25 VITALS
SYSTOLIC BLOOD PRESSURE: 127 MMHG | TEMPERATURE: 98 F | BODY MASS INDEX: 24.78 KG/M2 | OXYGEN SATURATION: 95 % | HEART RATE: 67 BPM | RESPIRATION RATE: 16 BRPM | WEIGHT: 163 LBS | DIASTOLIC BLOOD PRESSURE: 65 MMHG

## 2025-04-25 DIAGNOSIS — K80.10 CHRONIC CHOLECYSTITIS WITH CALCULUS: Primary | ICD-10-CM

## 2025-04-25 PROCEDURE — 47562 LAPAROSCOPIC CHOLECYSTECTOMY: CPT

## 2025-04-25 PROCEDURE — 47562 LAPAROSCOPIC CHOLECYSTECTOMY: CPT | Performed by: SURGERY

## 2025-04-25 PROCEDURE — S2900 ROBOTIC SURGICAL SYSTEM: HCPCS | Performed by: SURGERY

## 2025-04-25 PROCEDURE — 88304 TISSUE EXAM BY PATHOLOGIST: CPT | Performed by: STUDENT IN AN ORGANIZED HEALTH CARE EDUCATION/TRAINING PROGRAM

## 2025-04-25 RX ORDER — HYDROMORPHONE HCL/PF 1 MG/ML
0.5 SYRINGE (ML) INJECTION
Status: DISCONTINUED | OUTPATIENT
Start: 2025-04-25 | End: 2025-04-25 | Stop reason: HOSPADM

## 2025-04-25 RX ORDER — SODIUM CHLORIDE, SODIUM LACTATE, POTASSIUM CHLORIDE, CALCIUM CHLORIDE 600; 310; 30; 20 MG/100ML; MG/100ML; MG/100ML; MG/100ML
125 INJECTION, SOLUTION INTRAVENOUS CONTINUOUS
Status: DISCONTINUED | OUTPATIENT
Start: 2025-04-25 | End: 2025-04-25 | Stop reason: HOSPADM

## 2025-04-25 RX ORDER — HYDROMORPHONE HCL/PF 1 MG/ML
0.5 SYRINGE (ML) INJECTION
Refills: 0 | Status: DISCONTINUED | OUTPATIENT
Start: 2025-04-25 | End: 2025-04-25 | Stop reason: HOSPADM

## 2025-04-25 RX ORDER — ROCURONIUM BROMIDE 10 MG/ML
INJECTION, SOLUTION INTRAVENOUS AS NEEDED
Status: DISCONTINUED | OUTPATIENT
Start: 2025-04-25 | End: 2025-04-25

## 2025-04-25 RX ORDER — SODIUM CHLORIDE, SODIUM LACTATE, POTASSIUM CHLORIDE, CALCIUM CHLORIDE 600; 310; 30; 20 MG/100ML; MG/100ML; MG/100ML; MG/100ML
INJECTION, SOLUTION INTRAVENOUS CONTINUOUS PRN
Status: DISCONTINUED | OUTPATIENT
Start: 2025-04-25 | End: 2025-04-25

## 2025-04-25 RX ORDER — OXYCODONE HYDROCHLORIDE 5 MG/1
5 TABLET ORAL EVERY 4 HOURS PRN
Qty: 10 TABLET | Refills: 0 | Status: SHIPPED | OUTPATIENT
Start: 2025-04-25 | End: 2025-05-05

## 2025-04-25 RX ORDER — LABETALOL HYDROCHLORIDE 5 MG/ML
INJECTION, SOLUTION INTRAVENOUS AS NEEDED
Status: DISCONTINUED | OUTPATIENT
Start: 2025-04-25 | End: 2025-04-25

## 2025-04-25 RX ORDER — ONDANSETRON 2 MG/ML
4 INJECTION INTRAMUSCULAR; INTRAVENOUS EVERY 4 HOURS PRN
Status: DISCONTINUED | OUTPATIENT
Start: 2025-04-25 | End: 2025-04-25 | Stop reason: HOSPADM

## 2025-04-25 RX ORDER — CEFAZOLIN SODIUM 2 G/50ML
2000 SOLUTION INTRAVENOUS ONCE
Status: COMPLETED | OUTPATIENT
Start: 2025-04-25 | End: 2025-04-25

## 2025-04-25 RX ORDER — PROPOFOL 10 MG/ML
INJECTION, EMULSION INTRAVENOUS AS NEEDED
Status: DISCONTINUED | OUTPATIENT
Start: 2025-04-25 | End: 2025-04-25

## 2025-04-25 RX ORDER — OXYCODONE HYDROCHLORIDE 5 MG/1
5 TABLET ORAL EVERY 4 HOURS PRN
Refills: 0 | Status: DISCONTINUED | OUTPATIENT
Start: 2025-04-25 | End: 2025-04-25 | Stop reason: HOSPADM

## 2025-04-25 RX ORDER — INDOCYANINE GREEN AND WATER 25 MG
5 KIT INJECTION ONCE
Status: COMPLETED | OUTPATIENT
Start: 2025-04-25 | End: 2025-04-25

## 2025-04-25 RX ORDER — ONDANSETRON 2 MG/ML
INJECTION INTRAMUSCULAR; INTRAVENOUS AS NEEDED
Status: DISCONTINUED | OUTPATIENT
Start: 2025-04-25 | End: 2025-04-25

## 2025-04-25 RX ORDER — MIDAZOLAM HYDROCHLORIDE 2 MG/2ML
INJECTION, SOLUTION INTRAMUSCULAR; INTRAVENOUS AS NEEDED
Status: DISCONTINUED | OUTPATIENT
Start: 2025-04-25 | End: 2025-04-25

## 2025-04-25 RX ORDER — ONDANSETRON 2 MG/ML
4 INJECTION INTRAMUSCULAR; INTRAVENOUS ONCE AS NEEDED
Status: DISCONTINUED | OUTPATIENT
Start: 2025-04-25 | End: 2025-04-25 | Stop reason: HOSPADM

## 2025-04-25 RX ORDER — LIDOCAINE HYDROCHLORIDE 10 MG/ML
INJECTION, SOLUTION EPIDURAL; INFILTRATION; INTRACAUDAL; PERINEURAL AS NEEDED
Status: DISCONTINUED | OUTPATIENT
Start: 2025-04-25 | End: 2025-04-25

## 2025-04-25 RX ORDER — DEXAMETHASONE SODIUM PHOSPHATE 10 MG/ML
INJECTION, SOLUTION INTRAMUSCULAR; INTRAVENOUS AS NEEDED
Status: DISCONTINUED | OUTPATIENT
Start: 2025-04-25 | End: 2025-04-25

## 2025-04-25 RX ORDER — BUPIVACAINE HYDROCHLORIDE AND EPINEPHRINE 2.5; 5 MG/ML; UG/ML
INJECTION, SOLUTION INFILTRATION; PERINEURAL AS NEEDED
Status: DISCONTINUED | OUTPATIENT
Start: 2025-04-25 | End: 2025-04-25 | Stop reason: HOSPADM

## 2025-04-25 RX ORDER — FENTANYL CITRATE/PF 50 MCG/ML
25 SYRINGE (ML) INJECTION
Status: DISCONTINUED | OUTPATIENT
Start: 2025-04-25 | End: 2025-04-25 | Stop reason: HOSPADM

## 2025-04-25 RX ORDER — MAGNESIUM HYDROXIDE 1200 MG/15ML
LIQUID ORAL AS NEEDED
Status: DISCONTINUED | OUTPATIENT
Start: 2025-04-25 | End: 2025-04-25 | Stop reason: HOSPADM

## 2025-04-25 RX ORDER — HYDROMORPHONE HYDROCHLORIDE 2 MG/ML
INJECTION, SOLUTION INTRAMUSCULAR; INTRAVENOUS; SUBCUTANEOUS AS NEEDED
Status: DISCONTINUED | OUTPATIENT
Start: 2025-04-25 | End: 2025-04-25

## 2025-04-25 RX ORDER — FENTANYL CITRATE 50 UG/ML
INJECTION, SOLUTION INTRAMUSCULAR; INTRAVENOUS AS NEEDED
Status: DISCONTINUED | OUTPATIENT
Start: 2025-04-25 | End: 2025-04-25

## 2025-04-25 RX ORDER — EPHEDRINE SULFATE 50 MG/ML
INJECTION INTRAVENOUS AS NEEDED
Status: DISCONTINUED | OUTPATIENT
Start: 2025-04-25 | End: 2025-04-25

## 2025-04-25 RX ADMIN — DEXAMETHASONE SODIUM PHOSPHATE 10 MG: 10 INJECTION INTRAMUSCULAR; INTRAVENOUS at 12:21

## 2025-04-25 RX ADMIN — PHENYLEPHRINE HYDROCHLORIDE 20 MCG/MIN: 50 INJECTION INTRAVENOUS at 12:29

## 2025-04-25 RX ADMIN — SODIUM CHLORIDE, SODIUM LACTATE, POTASSIUM CHLORIDE, AND CALCIUM CHLORIDE: .6; .31; .03; .02 INJECTION, SOLUTION INTRAVENOUS at 12:16

## 2025-04-25 RX ADMIN — PROPOFOL 150 MG: 10 INJECTION, EMULSION INTRAVENOUS at 12:21

## 2025-04-25 RX ADMIN — FENTANYL CITRATE 50 MCG: 50 INJECTION INTRAMUSCULAR; INTRAVENOUS at 12:39

## 2025-04-25 RX ADMIN — ONDANSETRON 4 MG: 2 INJECTION INTRAMUSCULAR; INTRAVENOUS at 13:31

## 2025-04-25 RX ADMIN — SUGAMMADEX 200 MG: 100 INJECTION, SOLUTION INTRAVENOUS at 13:42

## 2025-04-25 RX ADMIN — ROCURONIUM 50 MG: 50 INJECTION, SOLUTION INTRAVENOUS at 12:21

## 2025-04-25 RX ADMIN — LIDOCAINE HYDROCHLORIDE 50 MG: 10 INJECTION, SOLUTION EPIDURAL; INFILTRATION; INTRACAUDAL at 12:21

## 2025-04-25 RX ADMIN — LABETALOL HYDROCHLORIDE 10 MG: 5 INJECTION, SOLUTION INTRAVENOUS at 12:50

## 2025-04-25 RX ADMIN — OXYCODONE HYDROCHLORIDE 5 MG: 5 TABLET ORAL at 14:58

## 2025-04-25 RX ADMIN — FENTANYL CITRATE 50 MCG: 50 INJECTION INTRAMUSCULAR; INTRAVENOUS at 12:21

## 2025-04-25 RX ADMIN — MIDAZOLAM 1 MG: 1 INJECTION INTRAMUSCULAR; INTRAVENOUS at 12:17

## 2025-04-25 RX ADMIN — EPHEDRINE SULFATE 10 MG: 50 INJECTION INTRAVENOUS at 12:34

## 2025-04-25 RX ADMIN — MIDAZOLAM 1 MG: 1 INJECTION INTRAMUSCULAR; INTRAVENOUS at 12:16

## 2025-04-25 RX ADMIN — CEFAZOLIN SODIUM 2000 MG: 2 SOLUTION INTRAVENOUS at 12:24

## 2025-04-25 RX ADMIN — HYDROMORPHONE HYDROCHLORIDE 0.5 MG: 2 INJECTION INTRAMUSCULAR; INTRAVENOUS; SUBCUTANEOUS at 12:43

## 2025-04-25 RX ADMIN — INDOCYANINE GREEN AND WATER 5 MG: KIT at 11:06

## 2025-04-25 NOTE — OP NOTE
OPERATIVE REPORT  PATIENT NAME: Devin Cutler    :  1945  MRN: [id number]  Pt Location: AN OR ROOM 04    SURGERY DATE: 2025    Surgeons and Role:     * Kelvin Laguerre DO - Primary     * No Hyman PA-C - Assisting  I was present for the entire procedure. A qualified resident physician was not available, and a physician assistant was necessary to provide expert assistance in the form of providing optimal exposure with retraction, suturing, and assistance with dissection in order to perform the most efficient operation and in order to optimize patient safety in the absence of a qualified surgical resident.      Preop Diagnosis:  Chronic cholecystitis with calculus [K80.10]    Post-Op Diagnosis Codes:     * Chronic cholecystitis with calculus [K80.10]    Procedure(s):  CHOLECYSTECTOMY LAPAROSCOPIC W/ ROBOTICS    Specimen(s):  ID Type Source Tests Collected by Time Destination   1 :  Tissue Gallbladder TISSUE EXAM Kelvin Laguerre DO 2025 1327        Estimated Blood Loss:   Minimal    Drains:  * No LDAs found *    Anesthesia Type:   General/local    Operative Indications:  Chronic cholecystitis with calculus [K80.10]  History of cholecystostomy tube  Height 68 inches weight 74 kg 563 pounds.  BMI 25.  ASA 2,     \Operative Findings:  Extensive inflammation of the gallbladder.  Gallbladder was intrahepatic.        Complications:   None    Procedure and Technique:  Patient was brought the operative suite and identified by visualization, conversation, by armband.  Sequential compression pumps were placed.  He was given Ancef perioperatively.  Once under anesthesia abdomen was prepped and draped in a sterile fashion.  Timeout was performed and is assured that the prep was dried.  Local was instilled at the ear from the local fold.  Skin incision was made in the subcutaneous tissues were bluntly dissected with [place] clamps down to the fascia.  Fascia was lifted up and divided the midline.   Poke to the underlying peritoneum gaining access into the abdominal cavity.  8 mm working robotic port was placed.  2 other 8 mm working robotic ports were placed in the respective positions.  Finally finally the laparoscopic port was placed in the right anterior axillary line.  He had obvious flimsy adhesions of the omentum up to the gallbladder which were taken down with blunt dissection.  Robot was then docked.  Under direct visualization monopolar hook as well as a ProGrasp grasper were inserted.  I then went to the robotic console.  My assistant held the gallbladder superiorly with a grasper.  The cholecystostomy tube was removed to aid in superior traction there is a lot of thickening throughout the body of the gallbladder into the neck.  Careful dissection was carried out with the hook and hot cautery to peel the peritoneum away.  Patella was high and the gallbladder continued to dissect more proximally on the gallbladder.  As I got to the junction of the gallbladder and the cystic duct a small tear was made spilling debris and gallbladder sludge.  This was here to be grasped I could carefully dissect out distal cystic duct.  This is divided between Weck clips.  Large lymph node was noted medial to the cystic duct indicative of the cystic artery.  Careful dissection was carried out.  The artery was just lateral to the node.  This was also divided tween Weck clips.  However it became noticeable that the gallbladder was intrahepatic as I tried to dissect it up.  Attempted to perform a dome down technique.  Got into the gallbladder and there is a fair amount of oozing from the raw mucosal surface.  At this point I undocked the robot and returned to the operative field.  I used the laparoscope with the suction  to help clearly identify the rest of the gallbladder, back wall quickly switched ultrasonic maxwell.  I was able to dissect the back wall the gallbladder off the liver bed.  As I get around the  lymph node careful dissection was carried out and there was no further signs of an artery.  Gallbladder is then finally taken off the liver using the sono incision.  Inspected the gallbladder bed and there is no signs of any bleeding.  Gallbladder is placed into an Endo Catch bag.  Copious irrigation was carried out.  This point pneumoperitoneum was evacuated.  Gallbladder is brought to the umbilical port site within Endo Catch bag.  Other trocars removed.  Fascia at the umbilical site was closed using 0 Vicryl in a figure-of-eight fashion.  More local was instilled.  4-0 Monocryl was used to close all skin incisions and aseptic ear fashion.  Previous site of the percutaneous cholecystostomy tube was then covered with a bandage.  Please note I did attempt to use firefly throughout the dissection.  However once the cholecystostomy tube been removed there was spillage of bile making firefly imaging less than ideal.  Patient was awakened in the operating returned in the recovery area in stable condition having tolerated the procedure well.   I was present for the entire procedure.    Patient Disposition:  PACU              SIGNATURE: Kelvin Laguerre DO  DATE: April 25, 2025  TIME: 1:42 PM

## 2025-04-25 NOTE — ANESTHESIA POSTPROCEDURE EVALUATION
Post-Op Assessment Note    CV Status:  Stable    Pain management: adequate       Mental Status:  Alert and awake   Hydration Status:  Euvolemic   PONV Controlled:  Controlled   Airway Patency:  Patent  Two or more mitigation strategies used for obstructive sleep apnea   Post Op Vitals Reviewed: Yes    No anethesia notable event occurred.    Staff: Anesthesiologist, CRNA           Last Filed PACU Vitals:  Vitals Value Taken Time   Temp 98.6 °F (37 °C) 04/25/25 1400   Pulse 67 04/25/25 1415   /66 04/25/25 1415   Resp 12 04/25/25 1415   SpO2 97 % 04/25/25 1415       Modified Lisandro:     Vitals Value Taken Time   Activity 2 04/25/25 1415   Respiration 2 04/25/25 1415   Circulation 2 04/25/25 1415   Consciousness 2 04/25/25 1415   Oxygen Saturation 2 04/25/25 1415     Modified Lisandro Score: 10

## 2025-04-25 NOTE — ANESTHESIA POSTPROCEDURE EVALUATION
Post-Op Assessment Note    CV Status:  Stable    Pain management: adequate       Mental Status:  Alert and awake   Hydration Status:  Euvolemic   PONV Controlled:  Controlled   Airway Patency:  Patent     Post Op Vitals Reviewed: Yes    No anethesia notable event occurred.    Staff: CRNA           Last Filed PACU Vitals:  Vitals Value Taken Time   Temp 98.6 °F (37 °C) 04/25/25 1400   Pulse 66    /77    Resp     SpO2 99 RA

## 2025-04-25 NOTE — INTERVAL H&P NOTE
H&P reviewed. After examining the patient I find no changes in the patients condition since the H&P had been written.    Vitals:    04/25/25 1051   BP: (!) 203/77   Pulse: 61   Resp: 18   Temp: (!) 96.4 °F (35.8 °C)   SpO2: 100%

## 2025-04-25 NOTE — ANESTHESIA PREPROCEDURE EVALUATION
Procedure:  CHOLECYSTECTOMY LAPAROSCOPIC W/ ROBOTICS (Abdomen)    Relevant Problems   CARDIO   (+) Essential hypertension   (+) Paroxysmal atrial fibrillation (HCC)      ENDO   (+) Hypothyroidism due to acquired atrophy of thyroid      GI/HEPATIC   (+) GERD (gastroesophageal reflux disease)   (+) Liver abscess      NEURO/PSYCH   (+) Anxiety      GERD    TTE Feb 2025:    Left Ventricle: Left ventricular cavity size is normal. Wall thickness is normal. The left ventricular ejection fraction is 67% by biplane measurement. Systolic function is hyperdynamic. Wall motion cannot be accurately assessed. Diastolic function is normal.    Right Ventricle: Right ventricular cavity size is mildly dilated.    Aortic Valve: There is aortic valve sclerosis.         Physical Exam    Airway    Mallampati score: II  TM Distance: >3 FB  Neck ROM: full     Dental       Cardiovascular  Cardiovascular exam normal    Pulmonary  Pulmonary exam normal     Other Findings        Anesthesia Plan  ASA Score- 2     Anesthesia Type- general with ASA Monitors.         Additional Monitors:     Airway Plan: ETT.           Plan Factors-    Chart reviewed. EKG reviewed.  Existing labs reviewed. Patient summary reviewed.          Obstructive sleep apnea risk education given perioperatively.        Induction- intravenous.    Postoperative Plan-         Informed Consent- Anesthetic plan and risks discussed with patient.  I personally reviewed this patient with the CRNA. Discussed and agreed on the Anesthesia Plan with the CRNA..      NPO Status:  Vitals Value Taken Time   Date of last liquid 04/24/25 04/25/25 1034   Time of last liquid 2300 04/25/25 1034   Date of last solid 04/24/25 04/25/25 1034   Time of last solid 2300 04/25/25 1034

## 2025-04-28 DIAGNOSIS — M25.551 RIGHT HIP PAIN: ICD-10-CM

## 2025-04-28 DIAGNOSIS — F41.9 ANXIETY: ICD-10-CM

## 2025-04-28 NOTE — TELEPHONE ENCOUNTER
"Call from patient requesting refills of his \"dizzy pills\" and his Xanax. Patient was not sure what the name of his dizzy pills were and I read off the list and he advised it was celebrex. Please send to [facility]. Thank you  "

## 2025-04-29 RX ORDER — ALPRAZOLAM 0.5 MG
0.5 TABLET ORAL 3 TIMES DAILY PRN
Qty: 90 TABLET | Refills: 5 | Status: SHIPPED | OUTPATIENT
Start: 2025-04-29

## 2025-04-29 RX ORDER — CELECOXIB 200 MG/1
200 CAPSULE ORAL DAILY
Qty: 90 CAPSULE | Refills: 1 | Status: SHIPPED | OUTPATIENT
Start: 2025-04-29

## 2025-04-30 PROCEDURE — 88304 TISSUE EXAM BY PATHOLOGIST: CPT | Performed by: STUDENT IN AN ORGANIZED HEALTH CARE EDUCATION/TRAINING PROGRAM

## 2025-05-15 ENCOUNTER — OFFICE VISIT (OUTPATIENT)
Dept: SURGERY | Facility: CLINIC | Age: 80
End: 2025-05-15

## 2025-05-15 DIAGNOSIS — K80.10 CHRONIC CHOLECYSTITIS WITH CALCULUS: Primary | ICD-10-CM

## 2025-05-15 PROCEDURE — 99024 POSTOP FOLLOW-UP VISIT: CPT | Performed by: SURGERY

## 2025-05-15 NOTE — PROGRESS NOTES
Name: Devin Cutler      : 1945      MRN: [id number]  Encounter Provider: Kelvin Laguerre DO  Encounter Date: 5/15/2025   Encounter department: [facility] SURGERY [facility]  :  Assessment & Plan  Chronic cholecystitis with calculus  Status post robotic assisted laparoscopic cholecystectomy.  Doing quite well.  Pathology reviewed.  May resume diet and activity as tolerated           History of Present Illness   HPI  Devin Cutler is a 80 y.o. male who presents post operatively.  Laparoscopic cholecystectomy with robotics 2025.  No complaints  Final Diagnosis   A. Gallbladder, cholecystectomy:   - Marked chronic cholecystitis with cholelithiasis.    - Focal adherent liver parenchyma.          History obtained from: patient    Review of Systems   All other systems reviewed and are negative.    Medical History Reviewed by provider this encounter:     .     Objective   There were no vitals taken for this visit.     Physical Exam  Abdominal:      Comments: Well-healed trocar sites.  Abdomen soft

## 2025-05-15 NOTE — ASSESSMENT & PLAN NOTE
Status post robotic assisted laparoscopic cholecystectomy.  Doing quite well.  Pathology reviewed.  May resume diet and activity as tolerated

## 2025-05-15 NOTE — LETTER
May 15, 2025     Momo Trinidad MD  [address] [facility] [id number]    Patient: Devin Cutler   YOB: 1945   Date of Visit: 5/15/2025       Dear Dr. Momo Trinidad MD:    Thank you for referring Devin Cutler to me for evaluation. Below are my notes for this consultation.    If you have questions, please do not hesitate to call me. I look forward to following your patient along with you.         Sincerely,        Kelvin Laguerre DO        CC: No Recipients    Kelvin Laguerre DO  5/15/2025  3:51 PM  Sign when Signing Visit  Name: Devin Cutler      : 1945      MRN: [id number]  Encounter Provider: Kelvin Laguerre DO  Encounter Date: 5/15/2025   Encounter department: [facility] GENERAL SURGERY [facility]  :  Assessment & Plan  Chronic cholecystitis with calculus  Status post robotic assisted laparoscopic cholecystectomy.  Doing quite well.  Pathology reviewed.  May resume diet and activity as tolerated           History of Present Illness  HPI  Devin Cutler is a 80 y.o. male who presents post operatively.  Laparoscopic cholecystectomy with robotics 2025.  No complaints  Final Diagnosis   A. Gallbladder, cholecystectomy:   - Marked chronic cholecystitis with cholelithiasis.    - Focal adherent liver parenchyma.          History obtained from: patient    Review of Systems   All other systems reviewed and are negative.    Medical History Reviewed by provider this encounter:     .     Objective  There were no vitals taken for this visit.     Physical Exam  Abdominal:      Comments: Well-healed trocar sites.  Abdomen soft

## 2025-06-09 ENCOUNTER — NURSE TRIAGE (OUTPATIENT)
Age: 80
End: 2025-06-09

## 2025-06-09 ENCOUNTER — OFFICE VISIT (OUTPATIENT)
Dept: FAMILY MEDICINE CLINIC | Facility: CLINIC | Age: 80
End: 2025-06-09
Payer: COMMERCIAL

## 2025-06-09 VITALS
SYSTOLIC BLOOD PRESSURE: 98 MMHG | TEMPERATURE: 97.2 F | BODY MASS INDEX: 25.91 KG/M2 | OXYGEN SATURATION: 98 % | HEART RATE: 57 BPM | DIASTOLIC BLOOD PRESSURE: 52 MMHG | HEIGHT: 68 IN | WEIGHT: 171 LBS

## 2025-06-09 DIAGNOSIS — G25.81 RLS (RESTLESS LEGS SYNDROME): ICD-10-CM

## 2025-06-09 DIAGNOSIS — I10 ESSENTIAL HYPERTENSION: ICD-10-CM

## 2025-06-09 DIAGNOSIS — E03.4 HYPOTHYROIDISM DUE TO ACQUIRED ATROPHY OF THYROID: ICD-10-CM

## 2025-06-09 DIAGNOSIS — Z13.220 SCREENING FOR CHOLESTEROL LEVEL: ICD-10-CM

## 2025-06-09 DIAGNOSIS — K21.9 GASTROESOPHAGEAL REFLUX DISEASE WITHOUT ESOPHAGITIS: ICD-10-CM

## 2025-06-09 DIAGNOSIS — I48.0 PAROXYSMAL ATRIAL FIBRILLATION (HCC): ICD-10-CM

## 2025-06-09 DIAGNOSIS — F41.9 ANXIETY: Primary | ICD-10-CM

## 2025-06-09 PROCEDURE — G2211 COMPLEX E/M VISIT ADD ON: HCPCS | Performed by: FAMILY MEDICINE

## 2025-06-09 PROCEDURE — 99214 OFFICE O/P EST MOD 30 MIN: CPT | Performed by: FAMILY MEDICINE

## 2025-06-09 NOTE — ASSESSMENT & PLAN NOTE
Patient is stable with current anti-hypertensive medicine and continue to follow a low sodium diet and take current medication. All questions about this condition were answered today.   Orders:  •  Comprehensive metabolic panel; Future  •  CBC and differential; Future  •  Magnesium; Future  •  Uric acid; Future  •  UA/M w/rflx Culture, Comp

## 2025-06-09 NOTE — TELEPHONE ENCOUNTER
In March, Amiodarone was decreased to 200 mg daily at office visit. Patient has no refills.  Son in law asked if patient is to continue on the Amiodarone .  He is out of pills after today so will need a refill if he is to continue on the med.

## 2025-06-09 NOTE — TELEPHONE ENCOUNTER
"Reason for Disposition  • Caller has NON-URGENT medicine question about med that PCP or specialist prescribed and triager unable to answer question    Answer Assessment - Initial Assessment Questions  1. NAME of MEDICINE: \"What medicine(s) are you calling about?\"      Amiodarone  2. QUESTION: \"What is your question?\" (e.g., double dose of medicine, side effect)      Son in law asked if patient is to remain on 200 mg daily or discontinue ?  3. PRESCRIBER: \"Who prescribed the medicine?\" Reason: if prescribed by specialist, call should be referred to that group.  Cardiology- originally prescribed on hospital discharged.  Dose tapered until on 200 mg daily since March office visit with [facility]    Protocols used: Medication Question Call-Adult-[place]    "

## 2025-06-09 NOTE — PROGRESS NOTES
Name: Devin Cutler      : 1945      MRN: [id number]  Encounter Provider: Momo Trinidad MD  Encounter Date: 2025   Encounter department: [facility]  :  Assessment & Plan  Anxiety  Patient to continue utilizing medical therapy as well as counseling sources as applicable to condition. If suicidal thought or fear of suicide attempt, to call 911 and seek help immediately. Medications and therapy reviewed today and all patient  questions answered today.        Essential hypertension  Patient is stable with current anti-hypertensive medicine and continue to follow a low sodium diet and take current medication. All questions about this condition were answered today.   Orders:  •  Comprehensive metabolic panel; Future  •  CBC and differential; Future  •  Magnesium; Future  •  Uric acid; Future  •  UA/M w/rflx Culture, Comp    Gastroesophageal reflux disease without esophagitis  Patient to continue with present therapy and decrease caffeine, avoid ETOH and smoking to decrease acid production. Pt should also cease eating prior to bedtime and avoid excessive fluid intake prior to sleep. May use antacids as needed for breakthrough GERD. All pateint questions answered today about this condition.        Hypothyroidism due to acquired atrophy of thyroid  Patient to continue current dose of thyroid medicine and recheck TSH in 6 months   Orders:  •  TSH, 3rd generation with Free T4 reflex; Future    Paroxysmal atrial fibrillation (HCC)  Continue with anticogulation and rate control of heart rate. Concerns about condition were addressed today.        RLS (restless legs syndrome)  Patient is stable  and will continue present plan of care and reassess at next routine visit. All questions about this problem from patient were answered today.        Screening for cholesterol level    Orders:  •  Lipid Panel with Direct LDL reflex; Future          Falls Plan of Care: balance, strength, and gait  "training instructions were provided. Home safety education provided.     History of Present Illness   HPI  Review of Systems    Objective   BP 98/52 (BP Location: Left arm, Patient Position: Sitting, Cuff Size: Standard)   Pulse 57   Temp (!) 97.2 °F (36.2 °C) (Skin)   Ht 5' 8\" (1.727 m)   Wt 77.6 kg (171 lb)   SpO2 98%   BMI 26.00 kg/m²      Physical Exam    "

## 2025-06-10 DIAGNOSIS — I48.0 PAF (PAROXYSMAL ATRIAL FIBRILLATION) (HCC): ICD-10-CM

## 2025-06-10 RX ORDER — AMIODARONE HYDROCHLORIDE 200 MG/1
200 TABLET ORAL DAILY
Qty: 60 TABLET | Refills: 0 | Status: SHIPPED | OUTPATIENT
Start: 2025-06-10

## 2025-06-10 NOTE — TELEPHONE ENCOUNTER
"Spoke with patient's ANITA Davin, following up on his call from yesterday regarding Amiodarone, is he to continue it or stop. He is now out of pills, yesterday was his last dose.    As per OV 3/28/25 with Evans,     \"will reach out to his primary cardiologist to discuss length of amiodarone use given that this is generally used short-term\"    Davin will confirm which pharmacy to use as the [facility] listed may be one closing, just not sure when.  "

## 2025-06-10 NOTE — TELEPHONE ENCOUNTER
Left Davin detailed message relating Dr. Ness message - also provided c/b # if any further questions or concerns

## 2025-06-10 NOTE — TELEPHONE ENCOUNTER
"Medication: Amiodarone    Dose/Frequency: 200mg daily    Quantity: 30 R3    Pharmacy: [facility] [address] [place]    Office:   [] PCP/Provider -   [x] Speciality/Provider - Dr EVONNE Ness    Does the patient have enough for 3 days?   [] Yes   [x] No - Send as HP to POD      Dr Ness responded to the message:    \"Mello Ness MD to Me (Selected Message)        6/10/25 10:34 AM  I would suggest that he remains on amiodarone as prescribed, 200 mg daily.  Thank you\"          "

## 2025-06-19 DIAGNOSIS — I10 ESSENTIAL HYPERTENSION: ICD-10-CM

## 2025-06-19 RX ORDER — METOPROLOL TARTRATE 25 MG/1
25 TABLET, FILM COATED ORAL 2 TIMES DAILY
Qty: 180 TABLET | Refills: 1 | Status: SHIPPED | OUTPATIENT
Start: 2025-06-19

## 2025-06-20 DIAGNOSIS — F41.9 ANXIETY: ICD-10-CM

## 2025-06-20 DIAGNOSIS — G25.81 RLS (RESTLESS LEGS SYNDROME): ICD-10-CM

## 2025-06-20 RX ORDER — ROPINIROLE 0.5 MG/1
0.5 TABLET, FILM COATED ORAL 3 TIMES DAILY
Qty: 270 TABLET | Refills: 0 | Status: SHIPPED | OUTPATIENT
Start: 2025-06-20

## 2025-06-20 RX ORDER — ALPRAZOLAM 0.5 MG
0.5 TABLET ORAL 3 TIMES DAILY PRN
Qty: 90 TABLET | Refills: 5 | Status: SHIPPED | OUTPATIENT
Start: 2025-06-20

## 2025-07-14 DIAGNOSIS — M25.551 RIGHT HIP PAIN: ICD-10-CM

## 2025-07-15 RX ORDER — CELECOXIB 200 MG/1
200 CAPSULE ORAL 2 TIMES DAILY
Qty: 180 CAPSULE | Refills: 3 | Status: SHIPPED | OUTPATIENT
Start: 2025-07-15

## (undated) DEVICE — EXOFIN PRECISION PEN HIGH VISCOSITY TOPICAL SKIN ADHESIVE: Brand: EXOFIN PRECISION PEN, 1G

## (undated) DEVICE — COLUMN DRAPE

## (undated) DEVICE — HEAVY DUTY TABLE COVER: Brand: CONVERTORS

## (undated) DEVICE — DRAPE SHEET THREE QUARTER

## (undated) DEVICE — LAPROSCOPIC CURVED SPATULA ELECTRODE: Brand: CLEANCOAT

## (undated) DEVICE — INTENDED FOR TISSUE SEPARATION, AND OTHER PROCEDURES THAT REQUIRE A SHARP SURGICAL BLADE TO PUNCTURE OR CUT.: Brand: BARD-PARKER SAFETY BLADES SIZE 11, STERILE

## (undated) DEVICE — CURVED JAW CORDLESS ULTRASONIC DISSECTOR: Brand: SONICISION 7

## (undated) DEVICE — SUT VICRYL 0 UR-6 27 IN J603H

## (undated) DEVICE — PERMANENT CAUTERY HOOK: Brand: ENDOWRIST

## (undated) DEVICE — PROGRASP FORCEPS: Brand: ENDOWRIST

## (undated) DEVICE — TROCAR: Brand: KII FIOS FIRST ENTRY

## (undated) DEVICE — SUT MONOCRYL 4-0 PS-2 27 IN Y426H

## (undated) DEVICE — DECANTER: Brand: UNBRANDED

## (undated) DEVICE — GLOVE SRG BIOGEL ORTHOPEDIC 8

## (undated) DEVICE — HEM-O-LOK CLIP CARTRIDGE MED/LARGE DA VINCI SI/XI

## (undated) DEVICE — TISSUE RETRIEVAL SYSTEM: Brand: INZII RETRIEVAL SYSTEM

## (undated) DEVICE — MEDIUM-LARGE CLIP APPLIER: Brand: ENDOWRIST

## (undated) DEVICE — ARM DRAPE

## (undated) DEVICE — TOWEL SURG XR DETECT GREEN STRL RFD

## (undated) DEVICE — BLADELESS OBTURATOR: Brand: WECK VISTA

## (undated) DEVICE — PACK PBDS LAP CHOLE RF

## (undated) DEVICE — SEAL

## (undated) DEVICE — CLIP APPLIER WITH CLIP LOGIC TECHNOLOGY: Brand: ENDO CLIP III

## (undated) DEVICE — NEEDLE 23G X 1 1/2 SAFETY-GLIDE THIN WALL